# Patient Record
Sex: MALE | Race: WHITE | NOT HISPANIC OR LATINO | Employment: OTHER | URBAN - METROPOLITAN AREA
[De-identification: names, ages, dates, MRNs, and addresses within clinical notes are randomized per-mention and may not be internally consistent; named-entity substitution may affect disease eponyms.]

---

## 2017-01-25 ENCOUNTER — AMBULATORY - HEALTHEAST (OUTPATIENT)
Dept: CARDIOLOGY | Facility: CLINIC | Age: 62
End: 2017-01-25

## 2017-01-25 DIAGNOSIS — I48.19 PERSISTENT ATRIAL FIBRILLATION (H): ICD-10-CM

## 2017-02-01 ENCOUNTER — HOSPITAL ENCOUNTER (OUTPATIENT)
Dept: CARDIOLOGY | Facility: CLINIC | Age: 62
Discharge: HOME | End: 2017-02-01
Attending: INTERNAL MEDICINE

## 2017-02-01 DIAGNOSIS — I48.19 PERSISTENT ATRIAL FIBRILLATION (H): ICD-10-CM

## 2017-02-15 ENCOUNTER — RECORDS - HEALTHEAST (OUTPATIENT)
Dept: LAB | Facility: CLINIC | Age: 62
End: 2017-02-15

## 2017-02-15 LAB
CHOLEST SERPL-MCNC: 224 MG/DL
FASTING STATUS PATIENT QL REPORTED: YES
HCV AB SERPL QL IA: NEGATIVE
HDLC SERPL-MCNC: 38 MG/DL
LDLC SERPL CALC-MCNC: 161 MG/DL
PSA SERPL-MCNC: 1 NG/ML (ref 0–4.5)
TRIGL SERPL-MCNC: 124 MG/DL

## 2017-03-06 ENCOUNTER — AMBULATORY - HEALTHEAST (OUTPATIENT)
Dept: CARDIOLOGY | Facility: CLINIC | Age: 62
End: 2017-03-06

## 2017-03-08 ENCOUNTER — OFFICE VISIT - HEALTHEAST (OUTPATIENT)
Dept: CARDIOLOGY | Facility: CLINIC | Age: 62
End: 2017-03-08

## 2017-03-08 DIAGNOSIS — G47.33 OSA ON CPAP: ICD-10-CM

## 2017-03-08 DIAGNOSIS — I48.4 ATYPICAL ATRIAL FLUTTER (H): ICD-10-CM

## 2017-03-08 DIAGNOSIS — I48.19 PERSISTENT ATRIAL FIBRILLATION (H): ICD-10-CM

## 2017-03-08 ASSESSMENT — MIFFLIN-ST. JEOR: SCORE: 2444.15

## 2017-03-27 ENCOUNTER — AMBULATORY - HEALTHEAST (OUTPATIENT)
Dept: CARDIOLOGY | Facility: CLINIC | Age: 62
End: 2017-03-27

## 2017-03-27 ENCOUNTER — COMMUNICATION - HEALTHEAST (OUTPATIENT)
Dept: CARDIOLOGY | Facility: CLINIC | Age: 62
End: 2017-03-27

## 2017-03-27 DIAGNOSIS — I48.91 ATRIAL FIBRILLATION (H): ICD-10-CM

## 2017-03-29 ENCOUNTER — HOSPITAL ENCOUNTER (OUTPATIENT)
Dept: CARDIOLOGY | Facility: CLINIC | Age: 62
Discharge: HOME | End: 2017-03-29
Attending: NURSE PRACTITIONER

## 2017-03-29 DIAGNOSIS — I48.91 ATRIAL FIBRILLATION (H): ICD-10-CM

## 2017-10-30 ENCOUNTER — AMBULATORY - HEALTHEAST (OUTPATIENT)
Dept: CARDIOLOGY | Facility: CLINIC | Age: 62
End: 2017-10-30

## 2017-10-30 ENCOUNTER — RECORDS - HEALTHEAST (OUTPATIENT)
Dept: ADMINISTRATIVE | Facility: OTHER | Age: 62
End: 2017-10-30

## 2017-10-31 ENCOUNTER — OFFICE VISIT - HEALTHEAST (OUTPATIENT)
Dept: CARDIOLOGY | Facility: CLINIC | Age: 62
End: 2017-10-31

## 2017-10-31 DIAGNOSIS — Z98.890 STATUS POST ABLATION OF ATRIAL FIBRILLATION: ICD-10-CM

## 2017-10-31 DIAGNOSIS — I48.19 PERSISTENT ATRIAL FIBRILLATION (H): ICD-10-CM

## 2017-10-31 DIAGNOSIS — I48.4 ATYPICAL ATRIAL FLUTTER (H): ICD-10-CM

## 2017-10-31 DIAGNOSIS — G47.33 OSA ON CPAP: ICD-10-CM

## 2017-10-31 DIAGNOSIS — Z86.79 STATUS POST ABLATION OF ATRIAL FIBRILLATION: ICD-10-CM

## 2017-10-31 ASSESSMENT — MIFFLIN-ST. JEOR: SCORE: 2412.4

## 2018-04-20 ENCOUNTER — RECORDS - HEALTHEAST (OUTPATIENT)
Dept: LAB | Facility: CLINIC | Age: 63
End: 2018-04-20

## 2018-04-20 LAB
ALBUMIN SERPL-MCNC: 3.9 G/DL (ref 3.5–5)
ALP SERPL-CCNC: 105 U/L (ref 45–120)
ALT SERPL W P-5'-P-CCNC: 24 U/L (ref 0–45)
ANION GAP SERPL CALCULATED.3IONS-SCNC: 11 MMOL/L (ref 5–18)
AST SERPL W P-5'-P-CCNC: 17 U/L (ref 0–40)
BILIRUB SERPL-MCNC: 0.5 MG/DL (ref 0–1)
BUN SERPL-MCNC: 11 MG/DL (ref 8–22)
CALCIUM SERPL-MCNC: 9.1 MG/DL (ref 8.5–10.5)
CHLORIDE BLD-SCNC: 107 MMOL/L (ref 98–107)
CHOLEST SERPL-MCNC: 240 MG/DL
CO2 SERPL-SCNC: 25 MMOL/L (ref 22–31)
CREAT SERPL-MCNC: 0.94 MG/DL (ref 0.7–1.3)
CREAT UR-MCNC: 327.6 MG/DL
FASTING STATUS PATIENT QL REPORTED: ABNORMAL
GFR SERPL CREATININE-BSD FRML MDRD: >60 ML/MIN/1.73M2
GLUCOSE BLD-MCNC: 110 MG/DL (ref 70–125)
HDLC SERPL-MCNC: 41 MG/DL
LDLC SERPL CALC-MCNC: 176 MG/DL
MICROALBUMIN UR-MCNC: 1.84 MG/DL (ref 0–1.99)
MICROALBUMIN/CREAT UR: 5.6 MG/G
POTASSIUM BLD-SCNC: 4.1 MMOL/L (ref 3.5–5)
PROT SERPL-MCNC: 7.1 G/DL (ref 6–8)
PSA SERPL-MCNC: 1.1 NG/ML (ref 0–4.5)
SODIUM SERPL-SCNC: 143 MMOL/L (ref 136–145)
TRIGL SERPL-MCNC: 113 MG/DL
TSH SERPL DL<=0.005 MIU/L-ACNC: 2.51 UIU/ML (ref 0.3–5)

## 2018-08-06 ENCOUNTER — COMMUNICATION - HEALTHEAST (OUTPATIENT)
Dept: CARDIOLOGY | Facility: CLINIC | Age: 63
End: 2018-08-06

## 2018-08-06 DIAGNOSIS — I48.91 A-FIB (H): ICD-10-CM

## 2018-08-23 ENCOUNTER — AMBULATORY - HEALTHEAST (OUTPATIENT)
Dept: CARDIOLOGY | Facility: CLINIC | Age: 63
End: 2018-08-23

## 2018-08-23 ENCOUNTER — RECORDS - HEALTHEAST (OUTPATIENT)
Dept: ADMINISTRATIVE | Facility: OTHER | Age: 63
End: 2018-08-23

## 2018-08-29 ENCOUNTER — COMMUNICATION - HEALTHEAST (OUTPATIENT)
Dept: TELEHEALTH | Facility: CLINIC | Age: 63
End: 2018-08-29

## 2018-08-29 ENCOUNTER — OFFICE VISIT - HEALTHEAST (OUTPATIENT)
Dept: CARDIOLOGY | Facility: CLINIC | Age: 63
End: 2018-08-29

## 2018-08-29 DIAGNOSIS — I48.19 PERSISTENT ATRIAL FIBRILLATION (H): ICD-10-CM

## 2018-08-29 DIAGNOSIS — I48.4 ATYPICAL ATRIAL FLUTTER (H): ICD-10-CM

## 2018-08-29 DIAGNOSIS — G47.33 OSA ON CPAP: ICD-10-CM

## 2018-08-29 ASSESSMENT — MIFFLIN-ST. JEOR: SCORE: 2435.08

## 2018-11-28 ENCOUNTER — RECORDS - HEALTHEAST (OUTPATIENT)
Dept: LAB | Facility: CLINIC | Age: 63
End: 2018-11-28

## 2018-11-28 LAB
ALBUMIN SERPL-MCNC: 3.9 G/DL (ref 3.5–5)
ALP SERPL-CCNC: 98 U/L (ref 45–120)
ALT SERPL W P-5'-P-CCNC: 32 U/L (ref 0–45)
ANION GAP SERPL CALCULATED.3IONS-SCNC: 10 MMOL/L (ref 5–18)
AST SERPL W P-5'-P-CCNC: 19 U/L (ref 0–40)
BILIRUB SERPL-MCNC: 0.5 MG/DL (ref 0–1)
BUN SERPL-MCNC: 13 MG/DL (ref 8–22)
CALCIUM SERPL-MCNC: 9.5 MG/DL (ref 8.5–10.5)
CHLORIDE BLD-SCNC: 107 MMOL/L (ref 98–107)
CHOLEST SERPL-MCNC: 232 MG/DL
CO2 SERPL-SCNC: 24 MMOL/L (ref 22–31)
CREAT SERPL-MCNC: 0.82 MG/DL (ref 0.7–1.3)
FASTING STATUS PATIENT QL REPORTED: ABNORMAL
GFR SERPL CREATININE-BSD FRML MDRD: >60 ML/MIN/1.73M2
GLUCOSE BLD-MCNC: 107 MG/DL (ref 70–125)
HDLC SERPL-MCNC: 42 MG/DL
LDLC SERPL CALC-MCNC: 165 MG/DL
POTASSIUM BLD-SCNC: 4.2 MMOL/L (ref 3.5–5)
PROT SERPL-MCNC: 6.9 G/DL (ref 6–8)
SODIUM SERPL-SCNC: 141 MMOL/L (ref 136–145)
TRIGL SERPL-MCNC: 123 MG/DL
TSH SERPL DL<=0.005 MIU/L-ACNC: 2.89 UIU/ML (ref 0.3–5)

## 2019-05-02 ENCOUNTER — AMBULATORY - HEALTHEAST (OUTPATIENT)
Dept: CARDIOLOGY | Facility: CLINIC | Age: 64
End: 2019-05-02

## 2019-05-16 ENCOUNTER — RECORDS - HEALTHEAST (OUTPATIENT)
Dept: LAB | Facility: CLINIC | Age: 64
End: 2019-05-16

## 2019-05-16 LAB
ALBUMIN SERPL-MCNC: 3.8 G/DL (ref 3.5–5)
ALP SERPL-CCNC: 94 U/L (ref 45–120)
ALT SERPL W P-5'-P-CCNC: 28 U/L (ref 0–45)
ANION GAP SERPL CALCULATED.3IONS-SCNC: 10 MMOL/L (ref 5–18)
AST SERPL W P-5'-P-CCNC: 17 U/L (ref 0–40)
BILIRUB SERPL-MCNC: 0.4 MG/DL (ref 0–1)
BUN SERPL-MCNC: 12 MG/DL (ref 8–22)
CALCIUM SERPL-MCNC: 9.6 MG/DL (ref 8.5–10.5)
CHLORIDE BLD-SCNC: 106 MMOL/L (ref 98–107)
CHOLEST SERPL-MCNC: 231 MG/DL
CO2 SERPL-SCNC: 27 MMOL/L (ref 22–31)
CREAT SERPL-MCNC: 0.98 MG/DL (ref 0.7–1.3)
FASTING STATUS PATIENT QL REPORTED: YES
GFR SERPL CREATININE-BSD FRML MDRD: >60 ML/MIN/1.73M2
GLUCOSE BLD-MCNC: 141 MG/DL (ref 70–125)
HDLC SERPL-MCNC: 39 MG/DL
LDLC SERPL CALC-MCNC: 169 MG/DL
POTASSIUM BLD-SCNC: 4.6 MMOL/L (ref 3.5–5)
PROT SERPL-MCNC: 6.5 G/DL (ref 6–8)
PSA SERPL-MCNC: 1.3 NG/ML (ref 0–4.5)
SODIUM SERPL-SCNC: 143 MMOL/L (ref 136–145)
TRIGL SERPL-MCNC: 117 MG/DL
TSH SERPL DL<=0.005 MIU/L-ACNC: 3.15 UIU/ML (ref 0.3–5)

## 2019-05-17 LAB
MEV IGG SER IA-ACNC: POSITIVE
MUV IGG SER QL IA: POSITIVE
RUBV IGG SERPL QL IA: POSITIVE

## 2019-07-14 ENCOUNTER — HOSPITAL ENCOUNTER (OUTPATIENT)
Dept: CT IMAGING | Facility: CLINIC | Age: 64
Discharge: HOME | End: 2019-07-14

## 2019-07-14 ENCOUNTER — RECORDS - HEALTHEAST (OUTPATIENT)
Dept: ADMINISTRATIVE | Facility: OTHER | Age: 64
End: 2019-07-14

## 2019-07-14 DIAGNOSIS — R10.9 RIGHT FLANK PAIN: ICD-10-CM

## 2019-07-15 ENCOUNTER — OFFICE VISIT - HEALTHEAST (OUTPATIENT)
Dept: UROLOGY | Facility: CLINIC | Age: 64
End: 2019-07-15

## 2019-07-15 DIAGNOSIS — N13.2 HYDRONEPHROSIS WITH URINARY OBSTRUCTION DUE TO URETERAL CALCULUS: ICD-10-CM

## 2019-07-15 DIAGNOSIS — N20.0 CALCULUS OF KIDNEY: ICD-10-CM

## 2019-07-15 DIAGNOSIS — N20.1 CALCULUS OF URETER: ICD-10-CM

## 2019-07-15 LAB
ALBUMIN UR-MCNC: ABNORMAL MG/DL
APPEARANCE UR: CLEAR
BILIRUB UR QL STRIP: ABNORMAL
COLOR UR AUTO: YELLOW
GLUCOSE UR STRIP-MCNC: NEGATIVE MG/DL
HGB UR QL STRIP: ABNORMAL
KETONES UR STRIP-MCNC: ABNORMAL MG/DL
LEUKOCYTE ESTERASE UR QL STRIP: NEGATIVE
NITRATE UR QL: NEGATIVE
PH UR STRIP: 5.5 [PH] (ref 5–8)
SP GR UR STRIP: 1.02 (ref 1–1.03)
UROBILINOGEN UR STRIP-ACNC: ABNORMAL

## 2019-07-15 RX ORDER — HYDROMORPHONE HYDROCHLORIDE 2 MG/1
2-4 TABLET ORAL EVERY 4 HOURS PRN
Qty: 12 TABLET | Refills: 0 | Status: SHIPPED | OUTPATIENT
Start: 2019-07-15 | End: 2021-10-25

## 2019-07-16 ENCOUNTER — SURGERY - HEALTHEAST (OUTPATIENT)
Dept: SURGERY | Facility: CLINIC | Age: 64
End: 2019-07-16

## 2019-07-16 ENCOUNTER — ANESTHESIA - HEALTHEAST (OUTPATIENT)
Dept: SURGERY | Facility: CLINIC | Age: 64
End: 2019-07-16

## 2019-07-16 ASSESSMENT — MIFFLIN-ST. JEOR: SCORE: 2394.54

## 2019-07-18 ENCOUNTER — COMMUNICATION - HEALTHEAST (OUTPATIENT)
Dept: UROLOGY | Facility: CLINIC | Age: 64
End: 2019-07-18

## 2019-07-22 ENCOUNTER — AMBULATORY - HEALTHEAST (OUTPATIENT)
Dept: UROLOGY | Facility: CLINIC | Age: 64
End: 2019-07-22

## 2019-07-22 DIAGNOSIS — N20.1 CALCULUS OF URETER: ICD-10-CM

## 2019-07-22 LAB
ALBUMIN UR-MCNC: ABNORMAL MG/DL
APPEARANCE UR: ABNORMAL
BILIRUB UR QL STRIP: ABNORMAL
COLOR UR AUTO: ABNORMAL
GLUCOSE UR STRIP-MCNC: NEGATIVE MG/DL
HGB UR QL STRIP: ABNORMAL
KETONES UR STRIP-MCNC: ABNORMAL MG/DL
LEUKOCYTE ESTERASE UR QL STRIP: NEGATIVE
NITRATE UR QL: NEGATIVE
PH UR STRIP: 5.5 [PH] (ref 5–8)
SP GR UR STRIP: 1.02 (ref 1–1.03)
UROBILINOGEN UR STRIP-ACNC: ABNORMAL

## 2019-07-23 LAB — BACTERIA SPEC CULT: NO GROWTH

## 2019-08-19 ENCOUNTER — HOSPITAL ENCOUNTER (OUTPATIENT)
Dept: CT IMAGING | Facility: CLINIC | Age: 64
Discharge: HOME | End: 2019-08-19
Attending: UROLOGY

## 2019-08-19 ENCOUNTER — OFFICE VISIT - HEALTHEAST (OUTPATIENT)
Dept: UROLOGY | Facility: CLINIC | Age: 64
End: 2019-08-19

## 2019-08-19 DIAGNOSIS — N20.0 CALCULUS OF KIDNEY: ICD-10-CM

## 2019-08-19 DIAGNOSIS — N20.1 CALCULUS OF URETER: ICD-10-CM

## 2019-08-19 LAB
ALBUMIN UR-MCNC: NEGATIVE MG/DL
APPEARANCE UR: CLEAR
BILIRUB UR QL STRIP: NEGATIVE
COLOR UR AUTO: YELLOW
GLUCOSE UR STRIP-MCNC: NEGATIVE MG/DL
HGB UR QL STRIP: NEGATIVE
KETONES UR STRIP-MCNC: NEGATIVE MG/DL
LEUKOCYTE ESTERASE UR QL STRIP: NEGATIVE
NITRATE UR QL: NEGATIVE
PH UR STRIP: 5 [PH] (ref 5–8)
SP GR UR STRIP: >=1.03 (ref 1–1.03)
UROBILINOGEN UR STRIP-ACNC: NORMAL

## 2019-10-02 ENCOUNTER — RECORDS - HEALTHEAST (OUTPATIENT)
Dept: LAB | Facility: CLINIC | Age: 64
End: 2019-10-02

## 2019-10-02 LAB
ALBUMIN SERPL-MCNC: 3.9 G/DL (ref 3.5–5)
ALP SERPL-CCNC: 98 U/L (ref 45–120)
ALT SERPL W P-5'-P-CCNC: 27 U/L (ref 0–45)
ANION GAP SERPL CALCULATED.3IONS-SCNC: 7 MMOL/L (ref 5–18)
AST SERPL W P-5'-P-CCNC: 20 U/L (ref 0–40)
BILIRUB SERPL-MCNC: 0.5 MG/DL (ref 0–1)
BUN SERPL-MCNC: 15 MG/DL (ref 8–22)
CALCIUM SERPL-MCNC: 10 MG/DL (ref 8.5–10.5)
CHLORIDE BLD-SCNC: 103 MMOL/L (ref 98–107)
CHOLEST SERPL-MCNC: 239 MG/DL
CO2 SERPL-SCNC: 31 MMOL/L (ref 22–31)
CREAT SERPL-MCNC: 1.07 MG/DL (ref 0.7–1.3)
FASTING STATUS PATIENT QL REPORTED: ABNORMAL
GFR SERPL CREATININE-BSD FRML MDRD: >60 ML/MIN/1.73M2
GLUCOSE BLD-MCNC: 148 MG/DL (ref 70–125)
HDLC SERPL-MCNC: 42 MG/DL
LDLC SERPL CALC-MCNC: 171 MG/DL
POTASSIUM BLD-SCNC: 4.7 MMOL/L (ref 3.5–5)
PROT SERPL-MCNC: 6.7 G/DL (ref 6–8)
SODIUM SERPL-SCNC: 141 MMOL/L (ref 136–145)
TRIGL SERPL-MCNC: 130 MG/DL
TSH SERPL DL<=0.005 MIU/L-ACNC: 3.47 UIU/ML (ref 0.3–5)

## 2019-12-10 ENCOUNTER — AMBULATORY - HEALTHEAST (OUTPATIENT)
Dept: LAB | Facility: CLINIC | Age: 64
End: 2019-12-10

## 2019-12-10 DIAGNOSIS — N20.0 CALCULUS OF KIDNEY: ICD-10-CM

## 2019-12-10 LAB
MAGNESIUM 24H UR-MRATE: 65 MG/24 HR (ref 75–150)
MAGNESIUM UR-MCNC: 6.5 MG/DL
SPECIMEN VOL UR: 1000 ML

## 2019-12-11 LAB
CALCIUM 24H UR-MRATE: 99 MG/24HR (ref 25–300)
CHLORIDE 24H UR-SRATE: 191 MMOL/24HR (ref 110–250)
CITRATE 24H UR-MCNC: 1188 MG/24HR
CREATININE, 24 HR URINE - HISTORICAL: 1941 MG/24HR
OXALATE MG/SPEC: 82.3 MG/24HR (ref 7–44)
PH UR STRIP: 5.5 [PH] (ref 4.5–8)
PHOSPHORUS URINE MG/SPEC: 761 MG/24HR
POTASSIUM 24H UR-SCNC: 64 MMOL/24HR (ref 30–90)
SODIUM 24H UR-SRATE: 188 MMOL/24HR (ref 40–217)
SPECIMEN VOL UR: 1000 ML
URIC ACID URINE MG/SPEC: 560 MG/24HR (ref 250–750)

## 2019-12-19 ENCOUNTER — COMMUNICATION - HEALTHEAST (OUTPATIENT)
Dept: UROLOGY | Facility: CLINIC | Age: 64
End: 2019-12-19

## 2019-12-19 DIAGNOSIS — R82.992 HYPEROXALURIA: ICD-10-CM

## 2019-12-19 DIAGNOSIS — R34 LOW URINE OUTPUT: ICD-10-CM

## 2019-12-19 DIAGNOSIS — N20.1 CALCULUS OF URETER: ICD-10-CM

## 2019-12-27 ENCOUNTER — AMBULATORY - HEALTHEAST (OUTPATIENT)
Dept: CARDIOLOGY | Facility: CLINIC | Age: 64
End: 2019-12-27

## 2019-12-27 ENCOUNTER — RECORDS - HEALTHEAST (OUTPATIENT)
Dept: ADMINISTRATIVE | Facility: OTHER | Age: 64
End: 2019-12-27

## 2020-01-07 ENCOUNTER — OFFICE VISIT - HEALTHEAST (OUTPATIENT)
Dept: CARDIOLOGY | Facility: CLINIC | Age: 65
End: 2020-01-07

## 2020-01-07 DIAGNOSIS — I48.4 ATYPICAL ATRIAL FLUTTER (H): ICD-10-CM

## 2020-01-07 DIAGNOSIS — E66.01 MORBID OBESITY WITH BMI OF 45.0-49.9, ADULT (H): ICD-10-CM

## 2020-01-07 DIAGNOSIS — I48.0 PAROXYSMAL ATRIAL FIBRILLATION (H): ICD-10-CM

## 2020-01-07 DIAGNOSIS — G47.33 OSA ON CPAP: ICD-10-CM

## 2020-01-07 DIAGNOSIS — Z98.890 STATUS POST ABLATION OF ATRIAL FIBRILLATION: ICD-10-CM

## 2020-01-07 DIAGNOSIS — Z86.79 STATUS POST ABLATION OF ATRIAL FIBRILLATION: ICD-10-CM

## 2020-01-07 ASSESSMENT — MIFFLIN-ST. JEOR: SCORE: 2421.47

## 2020-01-09 ENCOUNTER — HOSPITAL ENCOUNTER (OUTPATIENT)
Dept: CARDIOLOGY | Facility: CLINIC | Age: 65
Discharge: HOME | End: 2020-01-09
Attending: INTERNAL MEDICINE

## 2020-01-09 DIAGNOSIS — I48.4 ATYPICAL ATRIAL FLUTTER (H): ICD-10-CM

## 2020-01-09 DIAGNOSIS — I48.0 PAROXYSMAL ATRIAL FIBRILLATION (H): ICD-10-CM

## 2020-01-09 DIAGNOSIS — Z98.890 STATUS POST ABLATION OF ATRIAL FIBRILLATION: ICD-10-CM

## 2020-01-09 DIAGNOSIS — Z86.79 STATUS POST ABLATION OF ATRIAL FIBRILLATION: ICD-10-CM

## 2020-04-14 ENCOUNTER — RECORDS - HEALTHEAST (OUTPATIENT)
Dept: LAB | Facility: CLINIC | Age: 65
End: 2020-04-14

## 2020-04-14 LAB
ALBUMIN SERPL-MCNC: 3.8 G/DL (ref 3.5–5)
ALP SERPL-CCNC: 99 U/L (ref 45–120)
ALT SERPL W P-5'-P-CCNC: 24 U/L (ref 0–45)
ANION GAP SERPL CALCULATED.3IONS-SCNC: 13 MMOL/L (ref 5–18)
AST SERPL W P-5'-P-CCNC: 14 U/L (ref 0–40)
BILIRUB SERPL-MCNC: 0.4 MG/DL (ref 0–1)
BUN SERPL-MCNC: 15 MG/DL (ref 8–22)
CALCIUM SERPL-MCNC: 9 MG/DL (ref 8.5–10.5)
CHLORIDE BLD-SCNC: 105 MMOL/L (ref 98–107)
CO2 SERPL-SCNC: 23 MMOL/L (ref 22–31)
CREAT SERPL-MCNC: 0.93 MG/DL (ref 0.7–1.3)
GFR SERPL CREATININE-BSD FRML MDRD: >60 ML/MIN/1.73M2
GLUCOSE BLD-MCNC: 139 MG/DL (ref 70–125)
POTASSIUM BLD-SCNC: 3.9 MMOL/L (ref 3.5–5)
PROT SERPL-MCNC: 6.7 G/DL (ref 6–8)
SODIUM SERPL-SCNC: 141 MMOL/L (ref 136–145)
TSH SERPL DL<=0.005 MIU/L-ACNC: 3.58 UIU/ML (ref 0.3–5)

## 2020-06-23 ENCOUNTER — COMMUNICATION - HEALTHEAST (OUTPATIENT)
Dept: CARDIOLOGY | Facility: CLINIC | Age: 65
End: 2020-06-23

## 2020-06-23 DIAGNOSIS — I48.91 ATRIAL FIBRILLATION (H): ICD-10-CM

## 2020-06-25 ENCOUNTER — RECORDS - HEALTHEAST (OUTPATIENT)
Dept: ADMINISTRATIVE | Facility: OTHER | Age: 65
End: 2020-06-25

## 2020-10-20 ENCOUNTER — RECORDS - HEALTHEAST (OUTPATIENT)
Dept: LAB | Facility: CLINIC | Age: 65
End: 2020-10-20

## 2020-10-21 ENCOUNTER — RECORDS - HEALTHEAST (OUTPATIENT)
Dept: LAB | Facility: CLINIC | Age: 65
End: 2020-10-21

## 2020-10-21 LAB
ANION GAP SERPL CALCULATED.3IONS-SCNC: 12 MMOL/L (ref 5–18)
BUN SERPL-MCNC: 14 MG/DL (ref 8–22)
CALCIUM SERPL-MCNC: 9.2 MG/DL (ref 8.5–10.5)
CHLORIDE BLD-SCNC: 105 MMOL/L (ref 98–107)
CHOLEST SERPL-MCNC: 217 MG/DL
CO2 SERPL-SCNC: 24 MMOL/L (ref 22–31)
CREAT SERPL-MCNC: 1.06 MG/DL (ref 0.7–1.3)
FASTING STATUS PATIENT QL REPORTED: ABNORMAL
GFR SERPL CREATININE-BSD FRML MDRD: >60 ML/MIN/1.73M2
GLUCOSE BLD-MCNC: 108 MG/DL (ref 70–125)
HDLC SERPL-MCNC: 31 MG/DL
LDLC SERPL CALC-MCNC: 155 MG/DL
POTASSIUM BLD-SCNC: 4.6 MMOL/L (ref 3.5–5)
SODIUM SERPL-SCNC: 141 MMOL/L (ref 136–145)
TRIGL SERPL-MCNC: 153 MG/DL
TSH SERPL DL<=0.005 MIU/L-ACNC: 2.96 UIU/ML (ref 0.3–5)
VIT B12 SERPL-MCNC: <146 PG/ML (ref 213–816)

## 2021-05-06 ENCOUNTER — RECORDS - HEALTHEAST (OUTPATIENT)
Dept: LAB | Facility: CLINIC | Age: 66
End: 2021-05-06

## 2021-05-06 LAB
ALBUMIN SERPL-MCNC: 3.7 G/DL (ref 3.5–5)
ALP SERPL-CCNC: 107 U/L (ref 45–120)
ALT SERPL W P-5'-P-CCNC: 21 U/L (ref 0–45)
ANION GAP SERPL CALCULATED.3IONS-SCNC: 13 MMOL/L (ref 5–18)
AST SERPL W P-5'-P-CCNC: 15 U/L (ref 0–40)
BILIRUB SERPL-MCNC: 0.4 MG/DL (ref 0–1)
BUN SERPL-MCNC: 14 MG/DL (ref 8–22)
CALCIUM SERPL-MCNC: 9 MG/DL (ref 8.5–10.5)
CHLORIDE BLD-SCNC: 103 MMOL/L (ref 98–107)
CO2 SERPL-SCNC: 25 MMOL/L (ref 22–31)
CREAT SERPL-MCNC: 1.04 MG/DL (ref 0.7–1.3)
GFR SERPL CREATININE-BSD FRML MDRD: >60 ML/MIN/1.73M2
GLUCOSE BLD-MCNC: 125 MG/DL (ref 70–125)
POTASSIUM BLD-SCNC: 4.3 MMOL/L (ref 3.5–5)
PROT SERPL-MCNC: 6.8 G/DL (ref 6–8)
SODIUM SERPL-SCNC: 141 MMOL/L (ref 136–145)
TSH SERPL DL<=0.005 MIU/L-ACNC: 3.09 UIU/ML (ref 0.3–5)

## 2021-05-28 NOTE — PROGRESS NOTES
Pt to have an epidural of lower back at [facility] not scheduled yet, wanting hold order for Eliquis for 3 days.  Reviewed with Skyler this is ok form filled out for [facility] and faxed back copy for chart  Pt was called with the above.

## 2021-05-30 VITALS — BODY MASS INDEX: 41.75 KG/M2 | WEIGHT: 315 LBS | HEIGHT: 73 IN

## 2021-05-30 NOTE — ANESTHESIA PREPROCEDURE EVALUATION
Anesthesia Evaluation      Patient summary reviewed   History of anesthetic complications     Airway   Mallampati: II   Pulmonary - normal exam   (+) sleep apnea on CPAP, ,                          Cardiovascular - normal exam  (+) dysrhythmias, ,     ECG reviewed     ROS comment: H/O A-fib, ablation x 2  EF 55% 2016     Neuro/Psych - negative ROS     Endo/Other    (+) diabetes mellitus well controlled, obesity,      GI/Hepatic/Renal    (+)   chronic renal disease,     Comments: nephrolithiasis     Other findings: PONV  K 4.6      Dental - normal exam                        Anesthesia Plan  Planned anesthetic: general endotracheal    ASA 3   Induction: intravenous   Anesthetic plan and risks discussed with: patient    Post-op plan: routine recovery

## 2021-05-30 NOTE — PROGRESS NOTES
Assessment/Plan:        Diagnoses and all orders for this visit:    Calculus of ureter  -     Urinalysis Macroscopic  -     Verify informed consent; Standing  -     Diet NPO; Standing  -     Place sequential compression device; Standing  -     XR Retrograde Pyelogram W or WO KUB Intraoperative; Standing  -     levoFLOXacin 500 mg/100 mL IVPB 500 mg (LEVAQUIN)  -     ketorolac injection 15 mg (TORADOL)  -     acetaminophen tablet 1,000 mg (TYLENOL)  -     sterile water IR irrigation solution 3,000 mL  -     HYDROmorphone (DILAUDID) 2 MG tablet; Take 1-2 tablets (2-4 mg total) by mouth every 4 (four) hours as needed for pain.  Dispense: 12 tablet; Refill: 0  -     Patient Stated Goal: Know what to expect after surgery  -     Ureteroscopy Education    Calculus of kidney  -     Ureteroscopy Education    Hydronephrosis with urinary obstruction due to ureteral calculus    Other orders  -     Discontinue: oxyCODONE-acetaminophen (PERCOCET/ENDOCET) 5-325 mg per tablet; TAKE 1-2 TABLETS BY MOUTH FOUR TIMES DAILY AS NEEDED FOR PAIN; Refill: 0  -     tamsulosin (FLOMAX) 0.4 mg cap; TAKE ONE CAPSULE BY MOUTH ONCE DAILY FOR KIDNEY STONES; Refill: 0      Stone Management Plan  [facility] 9/22/2015 10/20/2015 9/21/2016   Urinary Tract Infection No suspicion of infection No suspicion of infection No suspicion of infection   Renal Colic Asymptomatic at this time Asymptomatic at this time Asymptomatic at this time   Renal Failure No suspicion of renal failure No suspicion of renal failure No suspicion of renal failure   Current CT date 9/22/2015 - 9/21/2016   Right sided stones? No - No   R Number of ureteral stones - - -   R GSD of ureteral stones - - -   R Location of ureteral stone - - -   R Number of kidney stones  - - -   R GSD of kidney stones - - -   R Hydronephrosis - - -   R Stone Event Resolved event No current event -   Diagnosis date - - -   Initial location of primary symptomatic stone - - -   Initial GSD of  primary symptomatic stone - - -   Resolved date 9/22/2015 - -   R Post-op status No residual stone - -   R MET status - - -   Failure - - -   R Current Plan - - -   MET - - -   Clear rationale - - -   Left sided stones? No - No   L Number of ureteral stones - - -   L GSD of ureteral stones - - -   L Location of ureteral stone - - -   L Number of kidney stones  - - -   L GSD of kidney stones - - -   L Hydronephrosis None - -   L Stone Event Resolved event No current event -   Diagnosis date - - -   Initial location of primary symptomatic stone - - -   Initial GSD of primary symptomatic stone - - -   Resolved date 9/22/2015 - -   Post-op status No residual stone - -   L Current Plan - - -   Clear rationale - - -   Observe rationale - - -             Subjective:      HPI  Mr. Khari Jamison is a 64 y.o.  male returning to the [facility] [place] following [place]'s ER visit for urolithiasis.    He is a recurrent calcium oxalate stone former who has required stone clearance procedures. He has previously participated in stone risk evaluation and remains adherent to recommendations. He has identified modifiable stone risks including:  low urine volume and type II diabetes. He has no identified non-modifiable stone risk factors.    He was last seen for long term follow up in 2016 with CT at the time demonstrating no stone burden. He was to return in 12 months with imaging but failed to do so.    He was referred by his PCP for recently diagnosed urolithiasis. He developed acute onset right flank pain 4 days ago. The pain was sharp and constant without radiation. It felt similar to pain with prior stone events. He had associated nausea and vomiting. He was seen by his [facility] clinic over the weekend and sent to have CT scan at [facility] 7/14/19 which reported a new, obstructing right ureteral stone. He was given percocet which he has been taking every 4 hours for pain. He was prescribed  flomax but has not initiated.    Significant current symptoms include:  Mild right flank pain. Pertinent negative current symptoms include:  fever, chills, left flank pain, nausea, vomiting, hematuria, urinary frequency and dysuria..     CT scan from 7/14/19 is personally reviewed and demonstrates a mildly obstructing 8 mm right proximal ureteral stone. Bilateral lower pole renal stones, both < 2 mm.    Significant labs from presentation include moderate hematuria, no pyuria, negative nitrite and no bacteria.    PLAN    63 yo diabetic M with hx of CaOx stone disease and previous risk factors of low urine volume and mild hyperoxaluria. Active stone formation with obstructing right proximal ureteral stone. Tiny, solitary bilateral renal stones.    Will proceed with ureterscopic stone clearance tomorrow. Risks and benefits were detailed of ureteroscopic stone clearance including potential issues of urinary or systemic infection, ureteral injury, inaccessible stone, incomplete stone clearance, multiple surgeries, and stent related symptoms of urgency, frequency and hematuria Patient verbalized understanding. Patient agrees with plan as discussed. Preoperative evaluation with primary care has not been requested because of urgency of situation.    For symptom control, he was prescribed dilaudid and Flomax. Over the counter symptom control medications of ibuprofen, Dramamine and Tylenol were recommended.    Patient also seen and examined by Rocio Tellez PA-C       ROS   A 12 point comprehensive review of systems is negative except for HPI.    Past Medical History:   Diagnosis Date     Arrhythmia     a-fib     Atrial fibrillation (H)      Back pain      Back pain      Diabetes mellitus (H)      Hyperlipemia      Kidney stone      Obesity      GASTON (obstructive sleep apnea)     CPAP     PONV (postoperative nausea and vomiting)     postoperatively with oral pain meds     Status post ablation of atrial fibrillation 1/27/2016     PVI Sept 2014 (cryo-PVI + roof line + SAMUEL line)       Past Surgical History:   Procedure Laterality Date     CARDIAC ELECTROPHYSIOLOGY MAPPING AND ABLATION  1/27/16    pvi     CARDIOVERSION  10/31/14     CARDIOVERSION  05/16/2016     COLONOSCOPY N/A 11/18/2015    Procedure: COLONOSCOPY WITH POLYPECTOMY USING BIOPSY FORCEP;  Surgeon: Coco Vallecillo MD;  Location: [facility];  Service:      CYSTOSCOPY W/ LASER LITHOTRIPSY  aug 2015     CYSTOSCOPY W/ URETERAL STENT PLACEMENT Bilateral 8/20/2015    Procedure: CYSTOSCOPY, BILATERAL STENT REMOVAL, LEFT URETEROSCOPY, STONE EXTRACTION, LEFT STENT INSERTION;  Surgeon: Ansley eJffery MD;  Location: [facility] OR;  Service:      eyelid lift       DC ABLATE HEART DYSRHYTHM FOCUS      Description: Catheter Ablation Atrial Fibrillation;  Recorded: 10/08/2014;  Comments: 9/4/14 PVI Cyro to 4 PVs; Roof and SAMUEL lines done.     DC CARDIOVERSION ELECTIVE ARRHYTHMIA EXTERNAL  09/11/2015    Description: Elective Cardioversion External;  Recorded: 03/06/2014;  Comments: 10/11/13; ; 11/27/13 and reverted to afib after 11 days on mod dose sotalol.; ; 1/3/14  sinus on Amio     DC CARDIOVERSION ELECTIVE ARRHYTHMIA EXTERNAL      Description: Elective Cardioversion External;  Recorded: 11/19/2014;  Comments: 10/11/13; ; 11/27/13 and reverted to afib after 11 days on mod dose sotalol.; ; 1/3/14  sinus on Amio.  10/31/14     DC REMOVAL OF SPERM DUCT(S)      Description: Surgery Of Male Genitalia Vasectomy;  Recorded: 10/29/2013;     pulmonary vein isolation  9/4/14     TONSILLECTOMY       VASECTOMY         Current Outpatient Medications   Medication Sig Dispense Refill     cetirizine (ZYRTEC) 10 MG tablet Take 10 mg by mouth daily as needed.        ELIQUIS 5 mg Tab tablet TAKE ONE TABLET BY MOUTH EVERY 12 HOURS 60 tablet 3     levothyroxine (SYNTHROID, LEVOTHROID) 88 MCG tablet Take 88 mcg by mouth daily.       metFORMIN (GLUCOPHAGE) 500 MG tablet Take 1,000 mg by mouth 2 (two)  times a day with meals.       pregabalin (LYRICA) 75 MG capsule Take 75 mg by mouth daily.        simvastatin (ZOCOR) 20 MG tablet Take 20 mg by mouth bedtime.        No current facility-administered medications for this visit.        No Known Allergies    Social History     Socioeconomic History     Marital status:      Spouse name: Not on file     Number of children: Not on file     Years of education: Not on file     Highest education level: Not on file   Occupational History     Occupation:  for [employer]     Employer: [employer]   Social Needs     Financial resource strain: Not on file     Food insecurity:     Worry: Not on file     Inability: Not on file     Transportation needs:     Medical: Not on file     Non-medical: Not on file   Tobacco Use     Smoking status: Never Smoker     Smokeless tobacco: Never Used   Substance and Sexual Activity     Alcohol use: No     Alcohol/week: 0.6 oz     Types: 1 Cans of beer per week     Drug use: No     Sexual activity: Not on file   Lifestyle     Physical activity:     Days per week: Not on file     Minutes per session: Not on file     Stress: Not on file   Relationships     Social connections:     Talks on phone: Not on file     Gets together: Not on file     Attends Buddhism service: Not on file     Active member of club or organization: Not on file     Attends meetings of clubs or organizations: Not on file     Relationship status: Not on file     Intimate partner violence:     Fear of current or ex partner: Not on file     Emotionally abused: Not on file     Physically abused: Not on file     Forced sexual activity: Not on file   Other Topics Concern     Not on file   Social History Narrative     Not on file       Family History   Problem Relation Age of Onset     Cancer Mother         uterine     Diabetes Maternal Aunt      Urolithiasis Neg Hx      Clotting disorder Neg Hx      Gout Neg Hx      Heart disease Neg Hx      Anesthesia problems Neg Hx       Objective:      Physical Exam  Vitals:    07/15/19 1332   BP: 134/79   Pulse: 94   Temp: 97.8  F (36.6  C)     General - well developed, well nourished, appropriate for age. Appears no distress at this time   Heart - regular rate and rhythm, no murmur  Respiratory - normal effort, clear to auscultation, good air entry without adventitious noises  Abdomen - morbidly obese soft, non-tender, no hepatosplenomegaly, no masses.   - right flank mild tenderness, no suprapubic tenderness, kidney and bladder non-palpable  MSK - normal spinal curvature. no spinal tenderness. normal gait. muscular strength intact.  Neurology - cranial nerves II-XII grossly intact, normal sensation, no unsteadiness  Skin - intact, no bruising, no gouty tophi  Psych - oriented to time, place, and person, normal mood and affect.      Labs   Urinalysis POC (Office):  Blood UA   Date Value Ref Range Status   09/21/2016 Negative Negative Final   10/20/2015 Negative Negative Final   09/22/2015 Negative Negative Final     Nitrite, UA   Date Value Ref Range Status   07/15/2019 Negative Negative Final   09/21/2016 Negative Negative Final   10/20/2015 Negative Negative Final   09/22/2015 Negative Negative Final   06/15/2015 Negative Negative Final   10/29/2010 Negative (Negative) Final     Leukocytes, UA    Date Value Ref Range Status   09/21/2016 Negative Negative Final   10/20/2015 Negative Negative Final   09/22/2015 Negative Negative Final     pH UA   Date Value Ref Range Status   09/21/2016 5.0 4.5 - 8.0 Final   10/20/2015 5.5 4.5 - 8.0 Final   09/22/2015 5.5 4.5 - 8.0 Final       Lab Urinalysis:  Blood, UA   Date Value Ref Range Status   07/15/2019 Moderate (!) Negative Final   06/15/2015 Large (!) Negative Final   10/29/2010 Large (!) (Negative) Final     Nitrite, UA   Date Value Ref Range Status   07/15/2019 Negative Negative Final   09/21/2016 Negative Negative Final   10/20/2015 Negative Negative Final   09/22/2015 Negative Negative Final    06/15/2015 Negative Negative Final   10/29/2010 Negative (Negative) Final     Leukocytes, UA   Date Value Ref Range Status   07/15/2019 Negative Negative Final   06/15/2015 Negative Negative Final   10/29/2010 Negative (Negative) Final     pH, UA   Date Value Ref Range Status   07/15/2019 5.5 5.0 - 8.0 Final   06/15/2015 5.5 4.5 - 8.0 Final   10/29/2010 5.5 4.5 - 8.0 Final

## 2021-05-30 NOTE — TELEPHONE ENCOUNTER
Patient currently has a stent in and is set for removal on Monday.  He left a message regarding possible excessive blood in his urine.  Spoke with patient, who states that the blood is now less than before.  He will stay hydrated and will call with any other issues.  He is feeling well and has no pain at this time.  Cydney Moran RN

## 2021-05-30 NOTE — PROGRESS NOTES
Assessment/Plan:        Diagnoses and all orders for this visit:    Calculus of ureter  -     Urinalysis Macroscopic  -     Culture, Urine- Future; Future; Expected date: 08/21/2019  -     Culture, Urine- Future  -     Cystoscopy with Stent Removal Education  -     Patient Stated Goal: Prevent further stones  -     CT Abdomen Pelvis Without Oral Without IV Contrast; Future; Expected date: 08/21/2019  -     ciprofloxacin HCl tablet 250 mg (CIPRO)  -     lidocaine HCl 2 % topical jelly 10 mL (UROJET)    Other orders  -     ciprofloxacin HCl (CIPRO) 250 MG tablet  -     lidocaine HCl (UROJET) 2 % topical jelly      Stone Management Plan  [facility] Stone Management 9/21/2016 7/15/2019 7/22/2019   Urinary Tract Infection No suspicion of infection No suspicion of infection No suspicion of infection   Renal Colic Asymptomatic at this time Well controlled symptoms Well controlled symptoms   Renal Failure No suspicion of renal failure No suspicion of renal failure No suspicion of renal failure   Current CT date 9/21/2016 7/14/2019 -   Right sided stones? No Yes -   R Number of ureteral stones - 1 -   R GSD of ureteral stones - 8 -   R Location of ureteral stone - Proximal -   R Number of kidney stones  - 1 -   R GSD of kidney stones - < 2 -   R Hydronephrosis - Mild -   R Stone Event - New event Established event   Diagnosis date - 7/14/2019 -   Initial location of primary symptomatic stone - Proximal -   Initial GSD of primary symptomatic stone - 8 -   Resolved date - - -   R Post-op status - - Stent Removal   R MET status - - -   Failure - - -   R Current Plan - Clear -   MET - - -   Clear rationale - Poor prognosis -   Left sided stones? No Yes -   L Number of ureteral stones - No ureteral stones -   L GSD of ureteral stones - - -   L Location of ureteral stone - - -   L Number of kidney stones  - 1 -   L GSD of kidney stones - < 2 -   L Hydronephrosis - None -   L Stone Event - No current event No current event   Diagnosis  date - - -   Initial location of primary symptomatic stone - - -   Initial GSD of primary symptomatic stone - - -   Resolved date - - -   Post-op status - - -   L Current Plan - Observe -   Clear rationale - - -   Observe rationale - Limited stone burden with good prognosis for spontaneous passage -             Subjective:      HPI  Mr. Khari Jamison is a 64 y.o.  male returning to the [facility] [place] for early postoperative follow up for anticipated stent removal.     He returns status post right ureteroscopic laser lithotripsy for proximal ureteral stone. He has had no unanticipated post-operative events.    He has had no symptoms suspicious for infection and stent was mildly symptomatic with typical issues of flank discomfort and lower urinary tract irritation.     Flexible cystoscopy is performed and indwelling stent is removed without incident.    He will follow up in the office in one month with imaging.       ROS   Review of systems is negative except for HPI.    Past Medical History:   Diagnosis Date     Arrhythmia     a-fib     Atrial fibrillation (H)      Back pain      Back pain      Diabetes mellitus (H)      Hyperlipemia      Kidney stone      Obesity      GASTON (obstructive sleep apnea)     CPAP     PONV (postoperative nausea and vomiting)     postoperatively with oral pain meds     Status post ablation of atrial fibrillation 1/27/2016    PVI Sept 2014 (cryo-PVI + roof line + SAMUEL line)       Past Surgical History:   Procedure Laterality Date     CARDIAC ELECTROPHYSIOLOGY MAPPING AND ABLATION  1/27/16    pvi     CARDIOVERSION  10/31/14     CARDIOVERSION  05/16/2016     COLONOSCOPY N/A 11/18/2015    Procedure: COLONOSCOPY WITH POLYPECTOMY USING BIOPSY FORCEP;  Surgeon: Coco Vallecillo MD;  Location: [facility];  Service:      CYSTOSCOPY W/ LASER LITHOTRIPSY  aug 2015     CYSTOSCOPY W/ URETERAL STENT PLACEMENT Bilateral 8/20/2015    Procedure: CYSTOSCOPY, BILATERAL STENT  REMOVAL, LEFT URETEROSCOPY, STONE EXTRACTION, LEFT STENT INSERTION;  Surgeon: Ansley Jeffery MD;  Location: [facility] OR;  Service:      eyelid lift       TX ABLATE HEART DYSRHYTHM FOCUS      Description: Catheter Ablation Atrial Fibrillation;  Recorded: 10/08/2014;  Comments: 9/4/14 PVI Cyro to 4 PVs; Roof and SAMUEL lines done.     TX CARDIOVERSION ELECTIVE ARRHYTHMIA EXTERNAL  09/11/2015    Description: Elective Cardioversion External;  Recorded: 03/06/2014;  Comments: 10/11/13; ; 11/27/13 and reverted to afib after 11 days on mod dose sotalol.; ; 1/3/14  sinus on Amio     TX CARDIOVERSION ELECTIVE ARRHYTHMIA EXTERNAL      Description: Elective Cardioversion External;  Recorded: 11/19/2014;  Comments: 10/11/13; ; 11/27/13 and reverted to afib after 11 days on mod dose sotalol.; ; 1/3/14  sinus on Amio.  10/31/14     TX CYSTO/URETERO W/LITHOTRIPSY &INDWELL STENT INSRT Right 7/16/2019    Procedure: CYSTOSCOPY RIGHT URETEROSCOPY, LASER  LITHOTRIPSY STENT INSERTION;  Surgeon: Kingston Kurtz MD;  Location: [facility] OR;  Service: Urology     TX REMOVAL OF SPERM DUCT(S)      Description: Surgery Of Male Genitalia Vasectomy;  Recorded: 10/29/2013;     pulmonary vein isolation  9/4/14     TONSILLECTOMY       VASECTOMY         Current Outpatient Medications   Medication Sig Dispense Refill     acetaminophen (TYLENOL) 500 MG tablet Take 1,000 mg by mouth every 6 (six) hours as needed for pain.       cetirizine (ZYRTEC) 10 MG tablet Take 10 mg by mouth daily as needed.        ELIQUIS 5 mg Tab tablet TAKE ONE TABLET BY MOUTH EVERY 12 HOURS 60 tablet 3     HYDROmorphone (DILAUDID) 2 MG tablet Take 1-2 tablets (2-4 mg total) by mouth every 4 (four) hours as needed for pain. 12 tablet 0     ibuprofen (ADVIL,MOTRIN) 200 MG tablet Take 400 mg by mouth every 6 (six) hours as needed for pain.       levothyroxine (SYNTHROID, LEVOTHROID) 88 MCG tablet Take 88 mcg by mouth daily.       metFORMIN (GLUCOPHAGE) 500 MG  tablet Take 1,000 mg by mouth 2 (two) times a day with meals.       pregabalin (LYRICA) 75 MG capsule Take 75 mg by mouth daily.        simvastatin (ZOCOR) 20 MG tablet Take 20 mg by mouth bedtime.        tamsulosin (FLOMAX) 0.4 mg cap TAKE ONE CAPSULE BY MOUTH ONCE DAILY FOR KIDNEY STONES  0     Current Facility-Administered Medications   Medication Dose Route Frequency Provider Last Rate Last Dose     ciprofloxacin HCl (CIPRO) 250 MG tablet              lidocaine HCl (UROJET) 2 % topical jelly                No Known Allergies    Social History     Socioeconomic History     Marital status:      Spouse name: Not on file     Number of children: Not on file     Years of education: Not on file     Highest education level: Not on file   Occupational History     Occupation:  for [employer]     Employer: [employer]   Social Needs     Financial resource strain: Not on file     Food insecurity:     Worry: Not on file     Inability: Not on file     Transportation needs:     Medical: Not on file     Non-medical: Not on file   Tobacco Use     Smoking status: Never Smoker     Smokeless tobacco: Never Used   Substance and Sexual Activity     Alcohol use: No     Alcohol/week: 0.6 oz     Types: 1 Cans of beer per week     Drug use: No     Sexual activity: Not on file   Lifestyle     Physical activity:     Days per week: Not on file     Minutes per session: Not on file     Stress: Not on file   Relationships     Social connections:     Talks on phone: Not on file     Gets together: Not on file     Attends Zoroastrianism service: Not on file     Active member of club or organization: Not on file     Attends meetings of clubs or organizations: Not on file     Relationship status: Not on file     Intimate partner violence:     Fear of current or ex partner: Not on file     Emotionally abused: Not on file     Physically abused: Not on file     Forced sexual activity: Not on file   Other Topics Concern     Not on file    Social History Narrative     Not on file       Family History   Problem Relation Age of Onset     Cancer Mother         uterine     Diabetes Maternal Aunt      Urolithiasis Neg Hx      Clotting disorder Neg Hx      Gout Neg Hx      Heart disease Neg Hx      Anesthesia problems Neg Hx      Objective:      Physical Exam  Vitals:    07/22/19 0907   BP: 131/73   Pulse: 70   Temp: 97.7  F (36.5  C)     General - well developed, well nourished, appropriate for age. Appears no distress at this time  Abdomen - morbidly obese soft, non-tender, no hepatosplenomegaly, no masses.   - no flank tenderness, no suprapubic tenderness, kidney and bladder non-palpable  MSK - normal spinal curvature. no spinal tenderness. normal gait. muscular strength intact.  Psych - oriented to time, place, and person, normal mood and affect.      Labs   Urinalysis POC (Office):  Blood UA   Date Value Ref Range Status   09/21/2016 Negative Negative Final   10/20/2015 Negative Negative Final   09/22/2015 Negative Negative Final     Nitrite, UA   Date Value Ref Range Status   07/22/2019 Negative Negative Final   07/15/2019 Negative Negative Final   09/21/2016 Negative Negative Final   10/20/2015 Negative Negative Final   09/22/2015 Negative Negative Final   06/15/2015 Negative Negative Final     Leukocytes, UA    Date Value Ref Range Status   09/21/2016 Negative Negative Final   10/20/2015 Negative Negative Final   09/22/2015 Negative Negative Final     pH UA   Date Value Ref Range Status   09/21/2016 5.0 4.5 - 8.0 Final   10/20/2015 5.5 4.5 - 8.0 Final   09/22/2015 5.5 4.5 - 8.0 Final       Lab Urinalysis:  Blood, UA   Date Value Ref Range Status   07/22/2019 Large (!) Negative Final   07/15/2019 Moderate (!) Negative Final   06/15/2015 Large (!) Negative Final     Nitrite, UA   Date Value Ref Range Status   07/22/2019 Negative Negative Final   07/15/2019 Negative Negative Final   09/21/2016 Negative Negative Final   10/20/2015 Negative Negative  Final   09/22/2015 Negative Negative Final   06/15/2015 Negative Negative Final     Leukocytes, UA   Date Value Ref Range Status   07/22/2019 Negative Negative Final   07/15/2019 Negative Negative Final   06/15/2015 Negative Negative Final     pH, UA   Date Value Ref Range Status   07/22/2019 5.5 5.0 - 8.0 Final   07/15/2019 5.5 5.0 - 8.0 Final   06/15/2015 5.5 4.5 - 8.0 Final

## 2021-05-30 NOTE — ANESTHESIA POSTPROCEDURE EVALUATION
Patient: Khari Jamison  #3  CYSTOSCOPY RIGHT URETEROSCOPY, LASER  LITHOTRIPSY STENT INSERTION  Anesthesia type: general    Patient location: PACU  Last vitals:   Vitals Value Taken Time   /84 7/16/2019 12:18 PM   Temp 37.1  C (98.8  F) 7/16/2019 11:30 AM   Pulse 62 7/16/2019 12:18 PM   Resp 16 7/16/2019 11:46 AM   SpO2 97 % 7/16/2019 12:19 PM   Vitals shown include unvalidated device data.  Post vital signs: stable  Level of consciousness: awake and responds to simple questions  Post-anesthesia pain: pain controlled  Post-anesthesia nausea and vomiting: no  Pulmonary: unassisted  Cardiovascular: stable  Hydration: adequate  Anesthetic events: no    QCDR Measures:  ASA# 11 - Laura-op Cardiac Arrest: ASA11B - Patient did NOT experience unanticipated cardiac arrest  ASA# 12 - Laura-op Mortality Rate: ASA12B - Patient did NOT die  ASA# 13 - PACU Re-Intubation Rate: ASA13B - Patient did NOT require a new airway mgmt  ASA# 10 - Composite Anes Safety: ASA10A - No serious adverse event    Additional Notes:

## 2021-05-30 NOTE — PATIENT INSTRUCTIONS - HE
Patient Stated Goal: Prevent further stones  Cystoscopy with Stent Removal    Cystoscopy is used to help diagnose urinary problems, or to remove a ureteral stent.    During a cystoscopy, your doctor examines the inside of your bladder with an instrument called a cystoscope. A cystoscope is a long, thin flexible tube with a camera at the end.    Your doctor will insert the scope into your urethra allowing him to visualize and evaluate the inside of the bladder for possible abnormalities. The urethra is the tube that carries urine to the outside of your body.    How is the stent removed?    Your stent will be removed in the [facility] with a small telescope and a grasping tool.  It usually takes less than 1 minute to remove the stent.    What should I expect after the stent is removed?     You should feel normal by the next day.    Some patients find:      An increase in back pain about an hour after the stent is removed as the kidney fills up with urine before it starts to empty.  It can be as uncomfortable as your initial stone episode.  Taking pain medications before stent removal may be helpful, but you would need someone else to drive you to and from your appointment.    Bladder symptoms usually disappear by the next morning.    Small amounts of blood in the urine may be seen occasionally for up to a week.    At Home:      It is important to drink plenty of fluids after your procedure    You may continue to use your pain medications as prescribed    What symptoms should I watch for?    Fever     Chills    Increasing back pain that is not relieved with pain medications    Large amounts of blood in the urine or large clots    Leakage of urine (incontinence)     Are not able to urinate for 8 hours    These symptoms may mean you have a blockage or infection. Call the [facility] 24 hours a day at [phone] immediately.

## 2021-05-30 NOTE — PATIENT INSTRUCTIONS - HE
Patient Stated Goal: Know what to expect after surgery  Ureteroscopy    Ureteroscopy is a procedure which is done for clearance of stones from the ureter, kidney or both. There are no incisions involved. The procedure involves your surgeon placing a small scope into your urethra. This is the opening where urine leaves your body.  The surgeon watches as they carefully guide the scope to the stone(s).  Modern flexible ureteroscopes can be used to reach virtually any location within the urinary tract.     The size, shape and location of the stone determines how best to treat the stone(s).  Whenever possible, stones are removed in one piece.  Larger stones need to be broken using a laser before removing in smaller pieces.  The goal is to remove all stones and stone fragments from that side of the body in a single treatment.  Complete stone clearance is an important step to prevent future kidney stone episodes.    Surgery:    Same day outpatient procedure    30-60 minutes    Procedure done in hospital surgical suite    General anesthesia (you will be asleep during the procedure)     Antibiotic prior to surgery to prevent infection    Physician will visit with you and respond to any questions or concerns and consent will be signed prior to going to the operating room    Risks:    Infection - Preoperative antibiotics should prevent new infections but it is possible that unanticipated bacteria may be introduced at time of surgery or that the stones were actually chronically infected before surgery      Injury - The ureter may be injured during this procedure.  This is most likely to happen if the ureter was very inflamed before surgery or if a stone is very tightly impacted.  The surgeon will not aggressively treat a stone if this creates a risk of injury.        Inaccessible Stones -A single procedure is effective in 95% of cases, but if your ureter is very narrow or your kidney stone is very impacted, a stent will be  placed and the procedure stopped.  In 1-2 weeks after the ureter has relaxed, the patient will be brought back to surgery and the procedure can be safely performed.      Incomplete stone clearance -Occasionally stone or stone fragments may not be completely cleared.  These may pass on their own, which may cause discomfort.  Our goal is to remove all possible stones and fragments.    Stent:      An internal soft tube will be placed between the kidney and the bladder while in surgery (after the stone is cleared). The stent will keep the kidney draining.    What should I expect?     It is common for a stent to cause some irritation and discomfort.   You may have:      The need to urinate suddenly     The need to urinate often     Pain during urination     A dull backache, which may get worse during urination     Blood stained urine (like fruit punch) and occasional small clots    It s important to remember the stent is necessary and only temporary. To feel more comfortable:      Drink more than you normally would but you do not have to constantly  flush your kidneys     Limiting your activity may decrease irritation or bleeding    Ibuprofen - 2 tablets every 6-8 hours     Use pain medications as directed.    When is the stent removed?    Most stents are removed within 5 days to 2 weeks after a procedure.     How is the stent removed?     Your stent will be removed in the [facility] with a small telescope and a grasping tool.  It usually takes less than 1 minute to remove the stent.    What should I expect after the stent is removed?     You should feel normal by the next day    Some patients find:    An increase in back pain about an hour after the stent is removed as the kidney fills up with urine before it starts to empty.  It can be as uncomfortable as your initial stone episode.  Taking pain medications before stent removal may be helpful, but you would need someone else to drive you to and from your  appointment.    Bladder symptoms usually disappear by the next morning.    Small amounts of blood in the urine may be seen occasionally for up to a week.    Diet:      After surgery, there are no dietary restrictions - Drink to thirst, there is no need to increase intake of fluids, as this may increase nausea symptoms. Try to eat smaller, more frequent snacks, instead of large meals.    Activity:    Many people return to work within 1-2 days. Fatigue is normal for a couple of weeks following surgery. With increased activity you may experience more discomfort and you may notice more blood in your urine.      Post-Operative Symptom Control    While you recover from your procedure, you can take steps to ease your recovery.    Medications that prevent further episodes of severe pain and help stones pass: Take these as prescribed on a regular basis even if you are NOT in pain      Ibuprofen (Advil or Motrin) - Is available over the counter Take 2 (200mg) tablets every 6 hours until the stone passes.  o prevents spasm of the ureter.    o Decreases pain      Dramamine - (drowsy version, non-generic formulation) Is available over the counter and decreases spasm of the ureter.  Take 50mg at bedtime every night until the stone passes. In addition, take every 6 hours as needed.  Dramamine:  o Decreases nausea  o Decreases acute pain  o Decreases recurrence of pain for next 24 hours  o Will help you sleep        *This medication will cause increased drowsiness, do not drive or operate machinery for 6 hours      Flomax- Studies show that Flomax decreases irritation from stents.   o Take every day with food until stone passes even if you do not have pain  o Flomax does not relieve pain.        *This medication may cause nasal congestion or light-headedness      Detrol ( Tolterodine) - After surgery Detrol may decrease stent irritation and pelvic pain  o Take as prescribed     *This medication may cause dry mouth, constipation or  blurry vision. Stop medication if unable to urinate.    Medication that are taken as needed to manage break through symptoms: Take these ONLY as required and hopefully not at all      Narcotics (Percocet, Vicodin, Dilaudid)- take as prescribed for severe pain unrelieved by ibuprofen and dramamine  o Take as prescribed for severe pain  o Narcotics have significant side effects and only  cover-up  pain. They have no effect on cause of pain.  o Common side effects:  - Confusion, disorientation and sedation - DO NOT DRIVE OR OPERATE MACHINERY WITHIN 24 HOURS  - Nausea - take Dramamine or Zofran  or Haldol to help control  - Constipation  - Sleep disturbances      Ondansetron (Zofran)-  o Take as prescribed  o Reserve for severe nausea  o May cause constipation, start over the counter Miralax if needed to treat this    Haldol-  o Take as prescribed  o Reserve for severe nausea    Warning Signs/Symptoms - Please call the [facility] 24 hours a day at [phone] IMMEDIATELY if you experience any of these:    Fever greater than 100.1     Chills    Pain NOT CONTROLLED by pain medications    Heavy bleeding or large clots in urine (small clots can be normal)    Persistent nausea and/or vomiting    Post-Operative Follow up:    The stone(s) will be sent from surgery to a lab for composition analysis.  These results are usually available before a one month post-operative visit.  If you had laser treatment to break up your stone, you will usually be scheduled for a low dose CT scan prior to your one month appointment.  This scan allows your surgeon to confirm that all stone fragments were cleared at time of surgery and that there have been no complications.  These results along with possible labs and urine studies will help us develop an individualized plan to prevent new stones from forming and keep existing stones from enlarging.  This visit is usually scheduled about 1 month after your original surgery.    The  [facility] can respond to your questions or concerns 24 hours a day at [phone].

## 2021-05-30 NOTE — PROGRESS NOTES
Patient presents to the office today for evaluation from a Primary Care Referral.  Cydney Moran RN

## 2021-05-30 NOTE — ANESTHESIA CARE TRANSFER NOTE
Last vitals:   Vitals:    07/16/19 1039   BP: 140/80   Pulse: 72   Resp: 16   Temp: 37.1  C (98.8  F)   SpO2: 99%     Patient's level of consciousness is drowsy  Spontaneous respirations: yes  Maintains airway independently: yes  Dentition unchanged: yes  Oropharynx: oropharynx clear of all foreign objects    QCDR Measures:  ASA# 20 - Surgical Safety Checklist: WHO surgical safety checklist completed prior to induction    PQRS# 430 - Adult PONV Prevention: 4558F - Pt received => 2 anti-emetic agents (different classes) preop & intraop  ASA# 8 - Peds PONV Prevention: NA - Not pediatric patient, not GA or 2 or more risk factors NOT present  PQRS# 424 - Laura-op Temp Management: 4559F - At least one body temp DOCUMENTED => 35.5C or 95.9F within required timeframe  PQRS# 426 - PACU Transfer Protocol: - Transfer of care checklist used  ASA# 14 - Acute Post-op Pain: ASA14B - Patient did NOT experience pain >= 7 out of 10

## 2021-05-31 VITALS — HEIGHT: 73 IN | WEIGHT: 315 LBS | BODY MASS INDEX: 41.75 KG/M2

## 2021-05-31 NOTE — PROGRESS NOTES
Assessment/Plan:        Diagnoses and all orders for this visit:    Calculus of ureter  -     Urinalysis Macroscopic    Calculus of kidney  -     Stone Formation, 24 Hour Urine x1; Future; Expected date: 09/02/2019  -     Patient Stated Goal: Prevent further stones  -     24 Hour Urine Collection Steps Education      Stone Management Plan  [facility] 7/15/2019 7/22/2019 8/19/2019   Urinary Tract Infection No suspicion of infection No suspicion of infection No suspicion of infection   Renal Colic Well controlled symptoms Well controlled symptoms Asymptomatic at this time   Renal Failure No suspicion of renal failure No suspicion of renal failure No suspicion of renal failure   Current CT date 7/14/2019 - 8/19/2019   Right sided stones? Yes - No   R Number of ureteral stones 1 - -   R GSD of ureteral stones 8 - -   R Location of ureteral stone Proximal - -   R Number of kidney stones  1 - -   R GSD of kidney stones < 2 - -   R Hydronephrosis Mild - None   R Stone Event New event Established event Resolved event   Diagnosis date 7/14/2019 - -   Initial location of primary symptomatic stone Proximal - -   Initial GSD of primary symptomatic stone 8 - -   Resolved date - - 8/19/2019   R Post-op status - Stent Removal No residual stone   R MET status - - -   Failure - - -   R Current Plan Clear - -   MET - - -   Clear rationale Poor prognosis - -   Left sided stones? Yes - Yes   L Number of ureteral stones No ureteral stones - No ureteral stones   L GSD of ureteral stones - - -   L Location of ureteral stone - - -   L Number of kidney stones  1 - Renal stones unchanged from last exam   L GSD of kidney stones < 2 - < 2   L Hydronephrosis None - None   L Stone Event No current event No current event No current event   Diagnosis date - - -   Initial location of primary symptomatic stone - - -   Initial GSD of primary symptomatic stone - - -   Resolved date - - -   Post-op status - - -   L Current Plan Observe -  Observe   Clear rationale - - -   Observe rationale Limited stone burden with good prognosis for spontaneous passage - Limited stone burden with good prognosis for spontaneous passage         Subjective:      HPI  Mr. Khari Jamison is a 64 y.o.  male returning to the [facility] for late postoperative follow-up.     He returns status post Right ureteroscopic laser lithotripsy for proximal ureteral stone. He has had no unanticipated events.     He is asymptomatic at present. He denies symptoms of fever, chills, flank pain, nausea, vomiting, urinary frequency and dysuria.    New CT scan was personally reviewed and demonstrates complete clearance of targeted stone  with resolution of previous hydronephrosis. No change to small left renal stone.    Stone composition was 100% calcium oxalate.     PLAN    63 yo diabetic M with hx of CaOx stone disease and previous risk factors of low urine volume and mild hyperoxaluria. Active stone formation with interval ureteroscopic clearance of obstructing right proximal ureteral stone. Nonobstructing small left renal stone.    He has previously undergone stone risk evaluation but will repeat evaluation because of progression of stone disease.  One 24 hour urine collection and dietary journal will be obtained at earliest covenience.    Patient also seen and examined by Rocio Tellez PA-C       ROS   Review of systems is negative except for HPI.    Past Medical History:   Diagnosis Date     Arrhythmia     a-fib     Atrial fibrillation (H)      Back pain      Back pain      Diabetes mellitus (H)      Hyperlipemia      Kidney stone      Obesity      GASTON (obstructive sleep apnea)     CPAP     PONV (postoperative nausea and vomiting)     postoperatively with oral pain meds     Status post ablation of atrial fibrillation 1/27/2016    PVI Sept 2014 (cryo-PVI + roof line + SAMUEL line)       Past Surgical History:   Procedure Laterality Date     CARDIAC  ELECTROPHYSIOLOGY MAPPING AND ABLATION  1/27/16    pvi     CARDIOVERSION  10/31/14     CARDIOVERSION  05/16/2016     COLONOSCOPY N/A 11/18/2015    Procedure: COLONOSCOPY WITH POLYPECTOMY USING BIOPSY FORCEP;  Surgeon: Coco Vallecillo MD;  Location: [facility];  Service:      CYSTOSCOPY W/ LASER LITHOTRIPSY  aug 2015     CYSTOSCOPY W/ URETERAL STENT PLACEMENT Bilateral 8/20/2015    Procedure: CYSTOSCOPY, BILATERAL STENT REMOVAL, LEFT URETEROSCOPY, STONE EXTRACTION, LEFT STENT INSERTION;  Surgeon: Ansley Jeffery MD;  Location: [facility] OR;  Service:      eyelid lift       VT ABLATE HEART DYSRHYTHM FOCUS      Description: Catheter Ablation Atrial Fibrillation;  Recorded: 10/08/2014;  Comments: 9/4/14 PVI Cyro to 4 PVs; Roof and SAMUEL lines done.     VT CARDIOVERSION ELECTIVE ARRHYTHMIA EXTERNAL  09/11/2015    Description: Elective Cardioversion External;  Recorded: 03/06/2014;  Comments: 10/11/13; ; 11/27/13 and reverted to afib after 11 days on mod dose sotalol.; ; 1/3/14  sinus on Amio     VT CARDIOVERSION ELECTIVE ARRHYTHMIA EXTERNAL      Description: Elective Cardioversion External;  Recorded: 11/19/2014;  Comments: 10/11/13; ; 11/27/13 and reverted to afib after 11 days on mod dose sotalol.; ; 1/3/14  sinus on Amio.  10/31/14     VT CYSTO/URETERO W/LITHOTRIPSY &INDWELL STENT INSRT Right 7/16/2019    Procedure: CYSTOSCOPY RIGHT URETEROSCOPY, LASER  LITHOTRIPSY STENT INSERTION;  Surgeon: Kingston Kurtz MD;  Location: [facility];  Service: Urology     VT REMOVAL OF SPERM DUCT(S)      Description: Surgery Of Male Genitalia Vasectomy;  Recorded: 10/29/2013;     pulmonary vein isolation  9/4/14     TONSILLECTOMY       VASECTOMY         Current Outpatient Medications   Medication Sig Dispense Refill     acetaminophen (TYLENOL) 500 MG tablet Take 1,000 mg by mouth every 6 (six) hours as needed for pain.       cetirizine (ZYRTEC) 10 MG tablet Take 10 mg by mouth daily as needed.        ELIQUIS 5 mg  Tab tablet TAKE ONE TABLET BY MOUTH EVERY 12 HOURS 60 tablet 3     HYDROmorphone (DILAUDID) 2 MG tablet Take 1-2 tablets (2-4 mg total) by mouth every 4 (four) hours as needed for pain. 12 tablet 0     ibuprofen (ADVIL,MOTRIN) 200 MG tablet Take 400 mg by mouth every 6 (six) hours as needed for pain.       levothyroxine (SYNTHROID, LEVOTHROID) 88 MCG tablet Take 88 mcg by mouth daily.       metFORMIN (GLUCOPHAGE) 500 MG tablet Take 1,000 mg by mouth 2 (two) times a day with meals.       pregabalin (LYRICA) 75 MG capsule Take 75 mg by mouth daily.        simvastatin (ZOCOR) 20 MG tablet Take 20 mg by mouth bedtime.        No current facility-administered medications for this visit.        No Known Allergies    Social History     Socioeconomic History     Marital status:      Spouse name: Not on file     Number of children: Not on file     Years of education: Not on file     Highest education level: Not on file   Occupational History     Occupation:  for [employer]     Employer: [employer]   Social Needs     Financial resource strain: Not on file     Food insecurity:     Worry: Not on file     Inability: Not on file     Transportation needs:     Medical: Not on file     Non-medical: Not on file   Tobacco Use     Smoking status: Never Smoker     Smokeless tobacco: Never Used   Substance and Sexual Activity     Alcohol use: No     Alcohol/week: 0.6 oz     Types: 1 Cans of beer per week     Drug use: No     Sexual activity: Not on file   Lifestyle     Physical activity:     Days per week: Not on file     Minutes per session: Not on file     Stress: Not on file   Relationships     Social connections:     Talks on phone: Not on file     Gets together: Not on file     Attends Worship service: Not on file     Active member of club or organization: Not on file     Attends meetings of clubs or organizations: Not on file     Relationship status: Not on file     Intimate partner violence:     Fear of current  or ex partner: Not on file     Emotionally abused: Not on file     Physically abused: Not on file     Forced sexual activity: Not on file   Other Topics Concern     Not on file   Social History Narrative     Not on file       Family History   Problem Relation Age of Onset     Cancer Mother         uterine     Diabetes Maternal Aunt      Urolithiasis Neg Hx      Clotting disorder Neg Hx      Gout Neg Hx      Heart disease Neg Hx      Anesthesia problems Neg Hx      Objective:      Physical Exam  Vitals:    08/19/19 1500   BP: 142/74   Pulse: 85   Temp: 97.9  F (36.6  C)     General - well developed, well nourished, appropriate for age. Appears no distress at this time  Abdomen - moderately obese soft, non-tender, no hepatosplenomegaly, no masses.   - no flank tenderness, no suprapubic tenderness, kidney and bladder non-palpable  MSK - normal spinal curvature. no spinal tenderness. normal gait. muscular strength intact.  Psych - oriented to time, place, and person, normal mood and affect.      Labs   Urinalysis POC (Office):  Blood UA   Date Value Ref Range Status   09/21/2016 Negative Negative Final   10/20/2015 Negative Negative Final   09/22/2015 Negative Negative Final     Nitrite, UA   Date Value Ref Range Status   08/19/2019 Negative Negative Final   07/22/2019 Negative Negative Final   07/15/2019 Negative Negative Final     Leukocytes, UA    Date Value Ref Range Status   09/21/2016 Negative Negative Final   10/20/2015 Negative Negative Final   09/22/2015 Negative Negative Final     pH UA   Date Value Ref Range Status   09/21/2016 5.0 4.5 - 8.0 Final   10/20/2015 5.5 4.5 - 8.0 Final   09/22/2015 5.5 4.5 - 8.0 Final       Lab Urinalysis:  Blood, UA   Date Value Ref Range Status   08/19/2019 Negative Negative Final   07/22/2019 Large (!) Negative Final   07/15/2019 Moderate (!) Negative Final     Nitrite, UA   Date Value Ref Range Status   08/19/2019 Negative Negative Final   07/22/2019 Negative Negative Final    07/15/2019 Negative Negative Final     Leukocytes, UA   Date Value Ref Range Status   08/19/2019 Negative Negative Final   07/22/2019 Negative Negative Final   07/15/2019 Negative Negative Final     pH, UA   Date Value Ref Range Status   08/19/2019 5.0 5.0 - 8.0 Final   07/22/2019 5.5 5.0 - 8.0 Final   07/15/2019 5.5 5.0 - 8.0 Final

## 2021-05-31 NOTE — PATIENT INSTRUCTIONS - HE
Patient Stated Goal: Prevent further stones  Steps for collecting a 24 hour urine specimen    Please follow the directions carefully. All urine voided for a 24-hour period needs to be collected into the jug.  DO NOT change any of your  normal  daily habits when doing this test. Continue to follow your regular diet, intake of fluids, and usual activity level. Pick the most convenient day with your schedule, perhaps on a weekend or a day off.    Start your Diet Log the day before collection and continue on the day of urine collection.  You MUST bring Diet Log with you on follow up visit to discuss results.    One 24hr Urine Collection     Two 24hr Urine Collections  (do not collect on consecutive days)    PLEASE COMPLETE THE 2nd JUG WITHIN 1-2 WEEKS FROM THE 1st JUG    STEP 1  Empty your bladder completely into the toilet. This will be your start time. Write your full legal name, start date and time on the jug label.  Collection start and stop times need to match exactly!  For example:  6 am to 6 am.    STEP 2  The next time you urinate, empty your bladder directly into the jug or collection hat and pour urine into the jug.  Screw the lid back onto the jug.  Do not spill!    STEP 3  Place the jug in the refrigerator or a cooler with ice during the collection period.  Failure to keep it cool could cause inaccurate test results. DO NOT Freeze.    STEP 4  Continue collecting all urine into the jug for the rest of the day, for the full 24 hours.  DO NOT stop early or go over 24 hours!    STEP 5  Exactly 24 hours from start of collection, write your full legal name, stop date and time on the jug label.   Collection start and stop times need to match exactly!  For example:  6 am to 6 am.  Failure to label correctly will result in recollection of urine specimen.    STEP 6  Return each jug within 24 hours after final urination.     STEP 7  Drop off jug locations:   [facility]: Mon-Fri 7am-7pm - Closed on  weekends  [facility]: Mon-Fri 7am-5pm - Closed on Sunday  [facility]: Mon-Fri 7am-6:30pm - Closed on weekends    STEP 8  Please call [facility] after return of your final jug to schedule your follow-up visit. [phone]

## 2021-06-01 VITALS — BODY MASS INDEX: 41.75 KG/M2 | WEIGHT: 315 LBS | HEIGHT: 73 IN

## 2021-06-03 VITALS — BODY MASS INDEX: 41.75 KG/M2 | WEIGHT: 315 LBS | HEIGHT: 73 IN

## 2021-06-04 VITALS
DIASTOLIC BLOOD PRESSURE: 82 MMHG | HEART RATE: 72 BPM | BODY MASS INDEX: 41.75 KG/M2 | WEIGHT: 315 LBS | RESPIRATION RATE: 20 BRPM | SYSTOLIC BLOOD PRESSURE: 142 MMHG | HEIGHT: 73 IN

## 2021-06-04 NOTE — TELEPHONE ENCOUNTER
Patient stated he wanted to cancel his follow up appt next week and discuss 24 hour urine results over the phone.    12/9/19 collection:  Urine volume very low 1000 mL  Creatinine 1941 mg  Calcium 99 mg  Sodium 188 mmol, previously 227-229  Citrate 1188 mg  Oxalate 82 mg, previously 43-44  PH 5.5    Persistent stone risk factor of low urine volume and now severe hyperoxaluria    Instructed Khari that he needs to focus on aggressive fluid intake to reach urine output of > 2L/day and cut out oxalate rich foods. He admits he has been eating more nuts.    Will put placeholder for CT in 12 months with updated 24 hour urine

## 2021-06-05 ENCOUNTER — RECORDS - HEALTHEAST (OUTPATIENT)
Dept: CARDIOLOGY | Facility: CLINIC | Age: 66
End: 2021-06-05

## 2021-06-05 DIAGNOSIS — I48.91 ATRIAL FIBRILLATION (H): ICD-10-CM

## 2021-06-05 NOTE — PROGRESS NOTES
[facility] HEART [facility]  Arrhythmia Clinic  Vincent Klein    Referring:      Assessment:         Persistent atrial fibrillation: The patient is approximately 3 years out from his ablation procedure to treat his atrial fibrillation.  The patient reports no palpitations or other symptoms suggesting atrial fibrillation or flutter in the past 18 months.  The patient is off membrane active antiarrhythmic drug therapy.  He remains on oral anticoagulant therapy with a chads vascular score of 1 presently which will increase to 2 in May.    Obstructive sleep apnea: The patient is compliant with CPAP therapy.    Borderline hypertension: The patient's blood pressure today is borderline elevated.  He reports that at home and at his primary's office he has been normotensive.  We will need to continue to watch this carefully.    Morbid obesity: The patient was encouraged to work on his weight loss and discuss this further with his primary care physician if he needs assistance.      Recommendations:    No change in his current medical therapy    48-hour monitor ordered to reassess his baseline rhythm status    Follow-up in the A. fib clinic with the EP nurse practitioner in 1 year.  The patient will contact us if he has symptoms suggesting recurrence of atrial fibrillation in the interim.    Patient Active Problem List   Diagnosis     Morbid obesity with BMI of 45.0-49.9, adult (H)     Type 2 Diabetes Mellitus     Hyperlipidemia     GASTON on CPAP     Persistent Atrial Fibrillation     Atypical atrial flutter (H)     Calculus of kidney     Hyperoxaluria     Status post ablation of atrial fibrillation       Subjective:  Khari Jamison (64 y.o. male) returns to the arrhythmia clinic for interval reevaluation of his atrial fibrillation post ablation.  The patient's over 3 years out from his ablation procedure to treat his atrial fibrillation.  He reports no symptoms of recurrent palpitations or other signs suggesting return  of atrial fibrillation or flutter.  He has not had any lightheadedness presyncope or syncope.  He remains fairly active and reports no exertional dyspnea or chest pain.  He has not experied any orthopnea, PND, or ankle edema.  He reports no new general healthcare concerns.    Current Outpatient Medications   Medication Sig Dispense Refill     acetaminophen (TYLENOL) 500 MG tablet Take 1,000 mg by mouth every 6 (six) hours as needed for pain.       cetirizine (ZYRTEC) 10 MG tablet Take 10 mg by mouth daily as needed.        ELIQUIS 5 mg Tab tablet TAKE ONE TABLET BY MOUTH EVERY 12 HOURS 60 tablet 3     HYDROmorphone (DILAUDID) 2 MG tablet Take 1-2 tablets (2-4 mg total) by mouth every 4 (four) hours as needed for pain. 12 tablet 0     ibuprofen (ADVIL,MOTRIN) 200 MG tablet Take 400 mg by mouth every 6 (six) hours as needed for pain.       levothyroxine (SYNTHROID, LEVOTHROID) 88 MCG tablet Take 88 mcg by mouth daily.       metFORMIN (GLUCOPHAGE) 500 MG tablet Take 1,000 mg by mouth 2 (two) times a day with meals.       pregabalin (LYRICA) 75 MG capsule Take 75 mg by mouth daily.        simvastatin (ZOCOR) 20 MG tablet Take 20 mg by mouth bedtime.        No current facility-administered medications for this visit.        Review of Systems:   General: WNL  Eyes: WNL  Ears/Nose/Throat: WNL  Lungs: WNL  Heart: WNL  Stomach: WNL  Bladder: WNL  Muscle/Joints: WNL  Skin: WNL  Nervous System: WNL  Mental Health: WNL     Blood: WNL    Family History  Family History   Problem Relation Age of Onset     Cancer Mother         uterine     Diabetes Maternal Aunt      Urolithiasis Neg Hx      Clotting disorder Neg Hx      Gout Neg Hx      Heart disease Neg Hx      Anesthesia problems Neg Hx        Social History   reports that he has never smoked. He has never used smokeless tobacco. He reports that he does not drink alcohol or use drugs.    Objective:   Vital signs in last 24 hours:  /82 (Patient Site: Left Arm, Patient  "Position: Sitting, Cuff Size: Adult Regular)   Pulse 72   Resp 20   Ht 6' 1\" (1.854 m)   Wt (!) 350 lb (158.8 kg)   BMI 46.18 kg/m    Weight: Weight: (!) 350 lb (158.8 kg)     Physical Exam:  General: The patient is alert oriented to person place and situation.  The patient is in no acute distress at the time of my evaluation.  Eyes: Pupils are equal, round, and reactive to light.  Conjunctiva and sclera are clear.  ENT: Oral mucosa is moist and without redness. No evident nasal discharge.  Pulmonary: Lungs are clear bilaterally with no rales, rhonchi, or wheezes.    Cardiovascular exam: Rhythm is regular with rare ectopic beats. S1 and S2 are normal. No significant murmur is present. JVP is normal. Lower extremities demonstrate no significant edema. Distal pulses are intact bilaterally.  Abdomen is morbidly obese, soft, and nontender.  Musculoskeletal: Spine is straight. Extremities without deformity.  Neuro: Gait is normal.   Skin is warm, dry, and otherwise intact.      Cardiographics:       Lab Results:   Lab Results   Component Value Date     10/02/2019    K 4.7 10/02/2019     10/02/2019    CO2 31 10/02/2019    BUN 15 10/02/2019    CREATININE 1.07 10/02/2019    CALCIUM 10.0 10/02/2019     Lab Results   Component Value Date    WBC 6.6 01/21/2016    WBC 6.2 09/03/2014    HGB 14.2 01/21/2016    HCT 43.5 01/21/2016    MCV 89 01/21/2016     01/21/2016     Lab Results   Component Value Date    INR 3.90 (H) 09/08/2014         Outside record review:        "

## 2021-06-09 NOTE — TELEPHONE ENCOUNTER
Spoke with pt and got new insurance numbers and will start with PA. May have to do Tier exception.

## 2021-06-09 NOTE — TELEPHONE ENCOUNTER
"----- Message from Yvonne Crawford RN sent at 6/23/2020  2:58 PM CDT -----  Regarding: FW: XAVIER reaves, have you seen this?  ----- Message -----  From: Rita Montoya  Sent: 6/23/2020   2:27 PM CDT  To: Paulo Grace Rn Support Pool  Subject: [facility]                                              Caller: Khari     Primary cardiologist: Dr. Klein    Detailed reason for call: Khari states his health insurance company is sending a request to Dr. Klein regarding priority for Khari taking Eloquis as opposed to Warfarin, generic or \"other\" anticoagulation. Please call back if you haven't received this and/or if you have questions.     Best phone number: [phone]    Best time to contact: Any    Ok to leave a detailed message? Yes    Device? No    Additional Info: Khari states he is unable to take Warfarin or the \"other\" anticoagulant.         "

## 2021-06-09 NOTE — PROGRESS NOTES
[facility]   Arrhythmia Clinic    Assessment/Plan:  Diagnoses and all orders for this visit:    Persistent atrial fibrillation and no recurrence since repeat ablation. [id number] score of 1 .  On Eliquis  without complications.  I recommended that Tyrone continue on Eliquis until follow-up with Dr. Klein in 6 months.  Would then ask Dr. Klein to give opinion on stopping Eliquis and going on full-strength aspirin.  I previously have kept him on Eliquis only to facilitate urgent cardioversion if he had reoccurrence.    Atypical atrial flutter with one recurrence since repeat ablation.  To stop sotalol today.  Will not replace it with any heart rate lowering medication as tendency towards  bradycardia.  He has no history of hypertension and typically blood pressure is lower than seen today.  Will follow.    GASTON on CPAP and using routinely and emphasized importance of this.    ______________________________________________________________________    Subjective:    I had the opportunity to see Khari Jamison at the [facility]. Khari Jamison is a 61 y.o. male and here for follow-up for persistent atrial fib and atypical atrial flutter .  Khari Jamison has a known history of persistent atrial fib and had a pulmonary vein isolation ablation with Dr. Klein in September 2014. With PVI, Tyrone had cryo to 4 pulmonary veins and SAMUEL and roofline done. He had reoccurrence of atrial flutter and had  repeat PVI in January 27 of 2016. Tyrone had no arrhythmias until he had recurrence of atypical atrial flutter in May of 2016 and had cardioversion restarted on sotalol at that time.  November 2016 his sotalol was decreased from 80 to40 twice daily.  He tells me that he is only gone one night without using his CPAP and had a very hard time sleeping.  He accidentally traveled without the mask.  He denies any symptoms of recurrence of atrial fib or flutter.  He has noticed no difference in how he feels on  lower dose of sotalol.  He denies bradycardic symptoms.  ________________________________________________________    Problem List:  Patient Active Problem List   Diagnosis     Morbid obesity with BMI of 45.0-49.9, adult     Type 2 Diabetes Mellitus     Hyperlipidemia     GASTON on CPAP     Persistent Atrial Fibrillation     Atypical atrial flutter     Hyperoxaluria     Status post ablation of atrial fibrillation     Medical History:  Past Medical History:   Diagnosis Date     Arrhythmia     a-fib     Atrial fibrillation      Back pain      Back pain      Diabetes mellitus      Hyperlipemia      Kidney stone      Obesity      GASTON (obstructive sleep apnea)     CPAP     PONV (postoperative nausea and vomiting)     postoperatively with oral pain meds     Status post ablation of atrial fibrillation 1/27/2016    PVI Sept 2014 (cryo-PVI + roof line + SAMUEL line)     Surgical History:  Past Surgical History:   Procedure Laterality Date     CARDIAC ELECTROPHYSIOLOGY MAPPING AND ABLATION  1/27/16    pvi     CARDIOVERSION  10/31/14     CARDIOVERSION  05/16/2016     COLONOSCOPY N/A 11/18/2015    Procedure: COLONOSCOPY WITH POLYPECTOMY USING BIOPSY FORCEP;  Surgeon: Coco Vallecillo MD;  Location: [facility] GI;  Service:      CYSTOSCOPY W/ LASER LITHOTRIPSY  aug 2015     CYSTOSCOPY W/ URETERAL STENT PLACEMENT Bilateral 8/20/2015    Procedure: CYSTOSCOPY, BILATERAL STENT REMOVAL, LEFT URETEROSCOPY, STONE EXTRACTION, LEFT STENT INSERTION;  Surgeon: Ansley Jeffery MD;  Location: [facility] OR;  Service:      eyelid lift       CO ABLATE HEART DYSRHYTHM FOCUS      Description: Catheter Ablation Atrial Fibrillation;  Recorded: 10/08/2014;  Comments: 9/4/14 PVI Cyro to 4 PVs; Roof and SAMUEL lines done.     CO CARDIOVERSION ELECTIVE ARRHYTHMIA EXTERNAL  09/11/2015    Description: Elective Cardioversion External;  Recorded: 03/06/2014;  Comments: 10/11/13; ; 11/27/13 and reverted to afib after 11 days on mod dose sotalol.; ; 1/3/14   "sinus on Amio     OH CARDIOVERSION ELECTIVE ARRHYTHMIA EXTERNAL      Description: Elective Cardioversion External;  Recorded: 11/19/2014;  Comments: 10/11/13; ; 11/27/13 and reverted to afib after 11 days on mod dose sotalol.; ; 1/3/14  sinus on Amio.  10/31/14     OH REMOVAL OF SPERM DUCT(S)      Description: Surgery Of Male Genitalia Vasectomy;  Recorded: 10/29/2013;     pulmonary vein isolation  9/4/14     TONSILLECTOMY       VASECTOMY       Social History:  Social History   Substance Use Topics     Smoking status: Never Smoker     Smokeless tobacco: Never Used     Alcohol use No        Review of Systems: Review of Systems:   General: WNL  Eyes: WNL  Ears/Nose/Throat: WNL  Lungs: WNL  Heart: WNL  Stomach: WNL  Bladder: WNL  Muscle/Joints: WNL  Skin: WNL  Nervous System: WNL  Mental Health: WNL     Blood: WNL      Family History:  Family History   Problem Relation Age of Onset     Cancer Mother      uterine     Diabetes Maternal Aunt      Urolithiasis Neg Hx      Clotting disorder Neg Hx      Gout Neg Hx      Heart disease Neg Hx      Anesthesia problems Neg Hx          Allergies:  No Known Allergies  Medications:  Current Outpatient Prescriptions   Medication Sig Dispense Refill     cetirizine (ZYRTEC) 10 MG tablet Take 10 mg by mouth daily as needed.        ELIQUIS 5 mg Tab tablet TAKE ONE TABLET BY MOUTH EVERY 12 HOURS 60 tablet 3     levothyroxine (SYNTHROID, LEVOTHROID) 88 MCG tablet Take 88 mcg by mouth daily.       metFORMIN (GLUCOPHAGE) 500 MG tablet Take 1,000 mg by mouth 2 (two) times a day with meals.       pregabalin (LYRICA) 75 MG capsule Take 75 mg by mouth daily.        simvastatin (ZOCOR) 20 MG tablet Take 20 mg by mouth bedtime.        No current facility-administered medications for this visit.        Objective:   Vital signs:  Visit Vitals     /80 (Patient Site: Left Arm, Patient Position: Sitting, Cuff Size: Adult Large)     Pulse (!) 56     Resp 16     Ht 6' 1\" (1.854 m)     Wt (!) 355 " lb (161 kg)     BMI 46.84 kg/m2         Physical Exam:    GENERAL APPEARANCE: Alert, cooperative and in no acute distress.  HEENT: No scleral icterus. No Xanthelasma. Oral mucuos membranes pink and moist.  NECK: JVP Nl.   CHEST: clear to auscultation  CARDIOVASCULAR: S1, S2 without murmur ,clicks or rubs. Regular, regular.  Radial and posterior tibial pulses are intact and symetric.  EXTREMITIES: No cyanosis, clubbing or edema.    Results personally reviewed:  7/14 Card MRI EF 73%. RA nl. LA mod enlarged. 4 PVs and largest 2,88 sq cm. No extracardiac findings.   8/14 AMANDEEP EF 60%. LV mod increased in thickness. No obvious valve dis. RA nl. LA not well seen.   January 2016 TE shows EF 55%. Left atrium moderately enlarged. No thrombus. Right atrium normal. No significant valve disease.  January 2016 reviewed PVI procedure note. All 4 pulmonary veins confirmed as isolated. Roof and SAMUEL lines confirmed as well. Tyrone had additional CFE and right CTI flutter lines created.  April 2016 2 weeks symptom monitor negative for any atrial fib or flutter.  February 2017 24-hour Holter shows sinus rhythm throughout with nocturnal bradycardia.  Ventricular response range 44-1 2 1.  Average ventricular response 59.  No A. fib or flutter seen.    Results for orders placed or performed in visit on 06/08/16   ECG 12 lead   Result Value Ref Range    SYSTOLIC BLOOD PRESSURE  mmHg    DIASTOLIC BLOOD PRESSURE  mmHg    VENTRICULAR RATE 48 BPM    ATRIAL RATE 48 BPM    P-R INTERVAL 166 ms    QRS DURATION 92 ms    Q-T INTERVAL 498 ms    QTC CALCULATION (BEZET) 444 ms    P Axis 42 degrees    R AXIS 22 degrees    T AXIS 23 degrees    MUSE DIAGNOSIS       Marked sinus bradycardia  Abnormal ECG  When compared with ECG of 18-MAY-2016 09:38,  ST no longer depressed in Anterior leads  Nonspecific T wave abnormality, improved in Inferior leads  T wave inversion no longer evident in Anterior leads  Confirmed by MARK ESCOTO, LES LOC:MARTINE ([id number]) on 6/8/2016  4:33:17 PM         TSH:   Lab Results   Component Value Date    TSH 2.35 02/15/2017     BNP: No results found for: BNP  BMP:  Lab Results   Component Value Date    CREATININE 0.83 02/15/2017    BUN 11 02/15/2017     02/15/2017    K 4.3 02/15/2017     02/15/2017    CO2 27 02/15/2017       This note has been dictated using voice recognition software. Any grammatical or context distortions are unintentional and inherent to the software.    KAYLEE SARAVIA RN, [facility]  [phone]

## 2021-06-09 NOTE — TELEPHONE ENCOUNTER
Information regarding your request   Prior Authorization Not Required  Will start Tier exception.

## 2021-06-13 NOTE — PROGRESS NOTES
"[facility] HEART [facility]  Arrhythmia Clinic  Vincent Klein    Referring:      Assessment:         Persistent atrial fibrillation: The patient is approximately 18 months out from his most recent procedure to treat his atrial fibrillation.  He had a single recurrence of atypical flutter just over a year ago and has had no documented recurrence of atrial fibrillation or flutter since that time.  The patient has remained asymptomatic.  Due to the patient's elevated [id number] score he does currently remain on oral anticoagulant therapy.  Patient has not had any bleeding problems.  The patient does however have high risk hobbies including use of multiple power tools and saws. He is off membrane active antiarrhythmic drug therapy at this time.    Obstructive sleep apnea: The patient is compliant with his CPAP therapy.      Recommendations:    No change in current medical therapy.  I would recommend that the patient remain on oral anticoagulant therapy.  The patient is a potential future candidate for left atrial appendage occlusion.    Follow-up in the A. fib clinic with the EP nurse practitioner in 1 year.      Patient Active Problem List   Diagnosis     Morbid obesity with BMI of 45.0-49.9, adult     Type 2 Diabetes Mellitus     Hyperlipidemia     GASTON on CPAP     Persistent Atrial Fibrillation     Atypical atrial flutter     Hyperoxaluria     Status post ablation of atrial fibrillation       Subjective:  Khari Jamison (62 y.o. male) returns to the arrhythmia clinic for interval reevaluation of his atrial fibrillation post ablation.  The patient is 18 months out from his most recent ablation for treatment of his persistent atrial fibrillation.  The patient had a single recurrence in May 2016 of an atypical flutter but since that time he notes only rare episodes of a \"spike\" in his heart rate.  This lasts less than a minute and resolves spontaneously.  He has had no sustained palpitations.  He has had no " "lightheadedness, presyncope, or syncope.  He has been vacationing in doing some fairly heavy exertion including walking and being at altitude without any chest pain or pressure.  His windedness is appropriate for the elevation in exertional level.  He reports no orthopnea, PND, or ankle edema.    Current Outpatient Prescriptions   Medication Sig Dispense Refill     cetirizine (ZYRTEC) 10 MG tablet Take 10 mg by mouth daily as needed.        ELIQUIS 5 mg Tab tablet TAKE ONE TABLET BY MOUTH EVERY 12 HOURS 60 tablet 3     levothyroxine (SYNTHROID, LEVOTHROID) 88 MCG tablet Take 88 mcg by mouth daily.       metFORMIN (GLUCOPHAGE) 500 MG tablet Take 1,000 mg by mouth 2 (two) times a day with meals.       pregabalin (LYRICA) 75 MG capsule Take 75 mg by mouth daily.        simvastatin (ZOCOR) 20 MG tablet Take 20 mg by mouth bedtime.        No current facility-administered medications for this visit.        Review of Systems:   General: WNL  Eyes: WNL  Ears/Nose/Throat: WNL  Lungs: WNL  Heart: WNL  Stomach: WNL  Bladder: WNL  Muscle/Joints: WNL  Skin: WNL  Nervous System: WNL  Mental Health: WNL     Blood: WNL    Family History  Family History   Problem Relation Age of Onset     Cancer Mother      uterine     Diabetes Maternal Aunt      Urolithiasis Neg Hx      Clotting disorder Neg Hx      Gout Neg Hx      Heart disease Neg Hx      Anesthesia problems Neg Hx        Social History   reports that he has never smoked. He has never used smokeless tobacco. He reports that he does not drink alcohol or use illicit drugs.    Objective:   Vital signs in last 24 hours:  /74 (Patient Site: Left Arm, Patient Position: Sitting, Cuff Size: Adult Large)  Pulse 64  Resp 18  Ht 6' 1\" (1.854 m)  Wt (!) 348 lb (157.9 kg)  BMI 45.91 kg/m2  Weight: Weight: (!) 348 lb (157.9 kg)     Physical Exam:  General: The patient is alert oriented to person place and situation.  The patient is in no acute distress at the time of my " evaluation.  Eyes: Pupils are equal, round, and reactive to light.  Conjunctiva and sclera are clear.  ENT: Oral mucosa is moist and without redness. No evident nasal discharge.  Pulmonary: Lungs are clear bilaterally with no rales, rhonchi, or wheezes.    Cardiovascular exam: Rhythm is regular. S1 and S2 are normal. No significant murmur is present. JVP is normal. Lower extremities demonstrate no significant edema. Distal pulses are intact bilaterally.  Abdomen is obese, soft, and nontender.  Musculoskeletal: Spine is straight. Extremities without deformity.  Neuro: Gait is normal.   Skin is warm, dry, and otherwise intact.      Cardiographics:       Lab Results:   Lab Results   Component Value Date     08/24/2017    K 4.2 08/24/2017     08/24/2017    CO2 27 08/24/2017    BUN 16 08/24/2017    CREATININE 0.87 08/24/2017    CALCIUM 9.4 08/24/2017     Lab Results   Component Value Date    WBC 6.6 01/21/2016    WBC 6.2 09/03/2014    HGB 14.2 01/21/2016    HCT 43.5 01/21/2016    MCV 89 01/21/2016     01/21/2016     Lab Results   Component Value Date    INR 3.90 (H) 09/08/2014         Outside record review:

## 2021-06-26 NOTE — PROGRESS NOTES
Progress Notes by Mary Kate Hermosillo CNP at 8/29/2018  7:50 AM     Author: Mary Kate Hermosillo CNP Service: -- Author Type: Nurse Practitioner    Filed: 8/29/2018  9:36 AM Encounter Date: 8/29/2018 Status: Signed    : Mary Kate Hermosillo CNP (Nurse Practitioner)          Click to link to [facility] Heart Care     [facility] HEART CARE ELECTROPHYSIOLOGY NOTE      Assessment/Recommendations   Assessment/Plan:    Diagnoses and all orders for this visit:    Atypical atrial flutter (H) with one documented episode of atypical atrial flutter in May 2016 with RVR of 191 on 12-lead EKG. Not very symptomatic with this but noted rapid heart rates on checking blood pressure.  He had about a week of frequent episodes of palpitations which lasted for about an hour and no particular trigger.  They were daily for about a week.  I wanted to get EKG evidence of arrhythmia prior to starting antiarrhythmic but Tyrone did not do 24-hour Holter because they stopped it as abruptly as they had started.  He and his wife still have not identified any particular trigger for these arrhythmia to occur.  I discussed with Tyrone if he has reoccurrence of atrial arrhythmias first antiarrhythmic choice would be sotalol 80 mg 1 tablet orally every 12 hours.  Tyrone tells me that he felt very lethargic on sotalol and wants to avoid use of antiarrhythmics if possible.  Will follow for reoccurrence.  To call if more frequent episodes of atrial fibrillation flutter or in this rhythm for 24 hours or more.  I discussed with Tyrone that recent studies recommend weight loss as it can help decrease  frequency of atrial fib and flutter.  I discussed gastric bypass clinic would be an option for him and he could do diet and exercise guidelines with providers there and does not necessarily have to consider surgery.  If he goes through this clinic it is generally covered by insurance.  At this point, he is not interested.      Persistent atrial fibrillation  (H) history and no documented reoccurrence of A. fib since repeat ablation in 2016.    GASTON on CPAP and using CPAP routinely.    [id number] score of 1 and on Eliquis.  Follow up in clinic with Dr. Klein in 1 year or sooner if needed.     History of Present Illness    Mr. Khari Jamison is a very pleasant 63 y.o. male who comes in today for EP follow-up for persistent atrial fibrillation and atypical atrial flutter.  Khari Jamison has a known history of persistent atrial fibrillation and had first pulmonary vein isolation ablation in September 2014.  He then had reoccurrence and had a redo PVI in January 2016.  Tyrone is had reoccurrence of atypical atrial flutter on May 16 of 2016 after redo PVI.  He has previously been on sotalol and amiodarone.  He called in in early August with complaints of frequent palpitations lasting about an hour every day for about a week.  See above for more details.  Outside of these episodes, he denies any other symptoms of reoccurrence of atrial fibrillation or flutter throughout the year.  He denies any changes in health or cardiac symptoms.  His father is 91 and doing well.  He lives in [place] and they are going out to visit him.  Cardiographics (personally reviewed):  Results for orders placed during the hospital encounter of 01/27/16   Echo Transesophageal With Bubble Study [ECH12] 01/27/2016    Narrative     Summary   Normal left ventricular size and systolic function.   Left ventricular ejection fraction is visually estimated to be 55%.   Moderately enlarged left atrium.   No evidence of thrombus within left atrium.             Results for orders placed or performed in visit on 06/08/16   ECG 12 lead   Result Value Ref Range    SYSTOLIC BLOOD PRESSURE  mmHg    DIASTOLIC BLOOD PRESSURE  mmHg    VENTRICULAR RATE 48 BPM    ATRIAL RATE 48 BPM    P-R INTERVAL 166 ms    QRS DURATION 92 ms    Q-T INTERVAL 498 ms    QTC CALCULATION (BEZET) 444 ms    P Axis 42 degrees    R AXIS 22 degrees     T AXIS 23 degrees    MUSE DIAGNOSIS       Marked sinus bradycardia  Abnormal ECG  When compared with ECG of 18-MAY-2016 09:38,  ST no longer depressed in Anterior leads  Nonspecific T wave abnormality, improved in Inferior leads  T wave inversion no longer evident in Anterior leads  Confirmed by CHESTER FLORES MD LOC:MARTINE ([id number]) on 6/8/2016 4:33:17 PM            Problem List:  Patient Active Problem List   Diagnosis   ? Morbid obesity with BMI of 45.0-49.9, adult (H)   ? Type 2 Diabetes Mellitus   ? Hyperlipidemia   ? GASTON on CPAP   ? Persistent Atrial Fibrillation   ? Atypical atrial flutter (H)   ? Hyperoxaluria (H)   ? Status post ablation of atrial fibrillation       Physical Examination Review of Systems   Vitals:    08/29/18 0754   BP: 130/80   Pulse: 66   Resp: 18     Body mass index is 46.57 kg/(m^2).  Wt Readings from Last 3 Encounters:   08/29/18 (!) 353 lb (160.1 kg)   10/31/17 (!) 348 lb (157.9 kg)   03/08/17 (!) 355 lb (161 kg)     General Appearance:   Alert, well-appearing and in no acute distress.   HEENT: Atraumatic, normocephalic.  No scleral icterus, normal conjunctivae; mucous membranes pink and moist.     Chest: Chest symmetric, spine straight.   Lungs:   Respirations unlabored: Lungs are clear to auscultation.   Cardiovascular:   Normal first and second heart sounds with no murmurs, rubs, or gallops.  Regular, regular.  Radial and posterior tibial pulses are intact.  Normal JVD, no edema.       Extremities: No cyanosis or clubbing   Musculoskeletal: Moves all extremities   Skin: Warm, dry, intact.    Neurologic: Mood and affect are appropriate, alert and oriented to person, place, time, and situation    General: WNL  Eyes: WNL  Ears/Nose/Throat: WNL  Lungs: Shortness of Breath  Heart: Irregular Heartbeat  Stomach: WNL  Bladder: WNL  Muscle/Joints: WNL  Skin: WNL  Nervous System: WNL  Mental Health: WNL     Blood: WNL       Medical History  Surgical History Family History Social History   Past  Medical History:   Diagnosis Date   ? Arrhythmia     a-fib   ? Atrial fibrillation (H)    ? Back pain    ? Back pain    ? Diabetes mellitus (H)    ? Hyperlipemia    ? Kidney stone    ? Obesity    ? GASTON (obstructive sleep apnea)     CPAP   ? PONV (postoperative nausea and vomiting)     postoperatively with oral pain meds   ? Status post ablation of atrial fibrillation 1/27/2016    PVI Sept 2014 (cryo-PVI + roof line + SAMUEL line)    Past Surgical History:   Procedure Laterality Date   ? CARDIAC ELECTROPHYSIOLOGY MAPPING AND ABLATION  1/27/16    pvi   ? CARDIOVERSION  10/31/14   ? CARDIOVERSION  05/16/2016   ? COLONOSCOPY N/A 11/18/2015    Procedure: COLONOSCOPY WITH POLYPECTOMY USING BIOPSY FORCEP;  Surgeon: Coco Vallecillo MD;  Location: [facility];  Service:    ? CYSTOSCOPY W/ LASER LITHOTRIPSY  aug 2015   ? CYSTOSCOPY W/ URETERAL STENT PLACEMENT Bilateral 8/20/2015    Procedure: CYSTOSCOPY, BILATERAL STENT REMOVAL, LEFT URETEROSCOPY, STONE EXTRACTION, LEFT STENT INSERTION;  Surgeon: Ansley Jeffery MD;  Location: [facility] OR;  Service:    ? eyelid lift     ? OK ABLATE HEART DYSRHYTHM FOCUS      Description: Catheter Ablation Atrial Fibrillation;  Recorded: 10/08/2014;  Comments: 9/4/14 PVI Cyro to 4 PVs; Roof and SAMUEL lines done.   ? OK CARDIOVERSION ELECTIVE ARRHYTHMIA EXTERNAL  09/11/2015    Description: Elective Cardioversion External;  Recorded: 03/06/2014;  Comments: 10/11/13; ; 11/27/13 and reverted to afib after 11 days on mod dose sotalol.; ; 1/3/14  sinus on Amio   ? OK CARDIOVERSION ELECTIVE ARRHYTHMIA EXTERNAL      Description: Elective Cardioversion External;  Recorded: 11/19/2014;  Comments: 10/11/13; ; 11/27/13 and reverted to afib after 11 days on mod dose sotalol.; ; 1/3/14  sinus on Amio.  10/31/14   ? OK REMOVAL OF SPERM DUCT(S)      Description: Surgery Of Male Genitalia Vasectomy;  Recorded: 10/29/2013;   ? pulmonary vein isolation  9/4/14   ? TONSILLECTOMY     ? VASECTOMY       Family History   Problem Relation Age of Onset   ? Cancer Mother      uterine   ? Diabetes Maternal Aunt    ? Urolithiasis Neg Hx    ? Clotting disorder Neg Hx    ? Gout Neg Hx    ? Heart disease Neg Hx    ? Anesthesia problems Neg Hx     Social History     Social History   ? Marital status:      Spouse name: N/A   ? Number of children: N/A   ? Years of education: N/A     Occupational History   ?  for [employer]     Social History Main Topics   ? Smoking status: Never Smoker   ? Smokeless tobacco: Never Used   ? Alcohol use No   ? Drug use: No   ? Sexual activity: Not on file     Other Topics Concern   ? Not on file     Social History Narrative          Medications  Allergies   Current Outpatient Prescriptions   Medication Sig Dispense Refill   ? cetirizine (ZYRTEC) 10 MG tablet Take 10 mg by mouth daily as needed.      ? ELIQUIS 5 mg Tab tablet TAKE ONE TABLET BY MOUTH EVERY 12 HOURS 60 tablet 3   ? levothyroxine (SYNTHROID, LEVOTHROID) 88 MCG tablet Take 88 mcg by mouth daily.     ? metFORMIN (GLUCOPHAGE) 500 MG tablet Take 1,000 mg by mouth 2 (two) times a day with meals.     ? pregabalin (LYRICA) 75 MG capsule Take 75 mg by mouth daily.      ? simvastatin (ZOCOR) 20 MG tablet Take 20 mg by mouth bedtime.        No current facility-administered medications for this visit.       No Known Allergies   Medical, surgical, family, social history, and medications were all reviewed and updated as necessary.   Lab Results    Chemistry CBC/INR CHOLESTROL   Lab Results   Component Value Date    CREATININE 0.94 04/20/2018    BUN 11 04/20/2018     04/20/2018    K 4.1 04/20/2018     04/20/2018    CO2 25 04/20/2018     Creatinine (mg/dL)   Date Value   04/20/2018 0.94   08/24/2017 0.87   02/15/2017 0.83   02/10/2016 0.87     No results found for: BNP Lab Results   Component Value Date    WBC 6.6 01/21/2016    HGB 14.2 01/21/2016    HCT 43.5 01/21/2016    MCV 89 01/21/2016      01/21/2016     Lab Results   Component Value Date    INR 3.90 (H) 09/08/2014      Lab Results   Component Value Date    CHOL 240 (H) 04/20/2018    HDL 41 04/20/2018    LDLCALC 176 (H) 04/20/2018    TRIG 113 04/20/2018          Total Time-25  minutes with greater than 50% spent talking to patient and family regarding patient's relevant diagnoses.  This note has been dictated using voice recognition software. Any grammatical, typographical, or context distortions are unintentional and inherent to the software.    Mary Kate Hermosillo RN,  [facility]   Electrophysiology  [phone]

## 2021-10-20 ENCOUNTER — LAB REQUISITION (OUTPATIENT)
Dept: LAB | Facility: CLINIC | Age: 66
End: 2021-10-20

## 2021-10-20 DIAGNOSIS — E03.9 HYPOTHYROIDISM, UNSPECIFIED: ICD-10-CM

## 2021-10-20 DIAGNOSIS — N20.1 CALCULUS OF URETER: Primary | ICD-10-CM

## 2021-10-20 DIAGNOSIS — E78.2 MIXED HYPERLIPIDEMIA: ICD-10-CM

## 2021-10-20 PROCEDURE — 80061 LIPID PANEL: CPT | Performed by: FAMILY MEDICINE

## 2021-10-20 PROCEDURE — 80053 COMPREHEN METABOLIC PANEL: CPT | Performed by: FAMILY MEDICINE

## 2021-10-20 PROCEDURE — 84443 ASSAY THYROID STIM HORMONE: CPT | Performed by: FAMILY MEDICINE

## 2021-10-21 ENCOUNTER — HOSPITAL ENCOUNTER (OUTPATIENT)
Dept: CT IMAGING | Facility: CLINIC | Age: 66
Discharge: HOME | End: 2021-10-21
Admitting: UROLOGY
Payer: COMMERCIAL

## 2021-10-21 DIAGNOSIS — N20.1 CALCULUS OF URETER: ICD-10-CM

## 2021-10-21 LAB
ALBUMIN SERPL-MCNC: 3.9 G/DL (ref 3.5–5)
ALP SERPL-CCNC: 102 U/L (ref 45–120)
ALT SERPL W P-5'-P-CCNC: 21 U/L (ref 0–45)
ANION GAP SERPL CALCULATED.3IONS-SCNC: 8 MMOL/L (ref 5–18)
AST SERPL W P-5'-P-CCNC: 14 U/L (ref 0–40)
BILIRUB SERPL-MCNC: 0.5 MG/DL (ref 0–1)
BUN SERPL-MCNC: 9 MG/DL (ref 8–22)
CALCIUM SERPL-MCNC: 9.4 MG/DL (ref 8.5–10.5)
CHLORIDE BLD-SCNC: 105 MMOL/L (ref 98–107)
CHOLEST SERPL-MCNC: 236 MG/DL
CO2 SERPL-SCNC: 29 MMOL/L (ref 22–31)
CREAT SERPL-MCNC: 0.96 MG/DL (ref 0.7–1.3)
GFR SERPL CREATININE-BSD FRML MDRD: 82 ML/MIN/1.73M2
GLUCOSE BLD-MCNC: 122 MG/DL (ref 70–125)
HDLC SERPL-MCNC: 39 MG/DL
LDLC SERPL CALC-MCNC: 172 MG/DL
POTASSIUM BLD-SCNC: 4.5 MMOL/L (ref 3.5–5)
PROT SERPL-MCNC: 6.9 G/DL (ref 6–8)
SODIUM SERPL-SCNC: 142 MMOL/L (ref 136–145)
TRIGL SERPL-MCNC: 126 MG/DL
TSH SERPL DL<=0.005 MIU/L-ACNC: 3.22 UIU/ML (ref 0.3–5)

## 2021-10-21 PROCEDURE — 74176 CT ABD & PELVIS W/O CONTRAST: CPT

## 2021-10-25 ENCOUNTER — TELEPHONE (OUTPATIENT)
Dept: UROLOGY | Facility: CLINIC | Age: 66
End: 2021-10-25

## 2021-10-25 ENCOUNTER — VIRTUAL VISIT (OUTPATIENT)
Dept: UROLOGY | Facility: CLINIC | Age: 66
End: 2021-10-25
Payer: COMMERCIAL

## 2021-10-25 DIAGNOSIS — N20.1 CALCULUS OF URETER: Primary | ICD-10-CM

## 2021-10-25 PROCEDURE — 99213 OFFICE O/P EST LOW 20 MIN: CPT | Mod: TEL | Performed by: UROLOGY

## 2021-10-25 ASSESSMENT — PAIN SCALES - GENERAL: PAINLEVEL: NO PAIN (0)

## 2021-10-25 NOTE — PATIENT INSTRUCTIONS
Patient Stated Goal: Pass my stone  Symptom Control While Passing A Stone    The goal of [facility] is to let a smaller kidney stone (less than 4 to 5 mm) pass without intervention if possible. Giving your body a chance to clear the stone may take a few hours up to a few weeks.  Keeping you well-informed, safe and fairly comfortable is important.    Drink to thirst  Do not attempt to  flush out  your stone by drinking too much fluid. This does not work and may increase nausea. Drink enough to satisfy your body s thirst. Eating your normal diet is fine.   Medications (that may be suggested or prescribed)    Ibuprofen (Advil or Motrin) Available over the counter  o Take two (200mg) tablets every six hours until the stone passes.  o Prevents spasm of the ureter.    o Decreases pain.      Dramamine* (drowsy version, non-generic formulation) Available over the counter  o Take 50mg at bedtime  o Decreases spasms of the ureter  o Decreases nausea  o Decreases acute pain  o Decreases recurrence of pain for 24 hours  o Will help you sleep  *This medication will cause increase drowsiness, do not drive or operate machinery for 6 hours.      Narcotics (Percocet, Vicodin, Dilaudid) Take as prescribed for severe pain unrelieved by ibuprofen and Dramamine  o Narcotics have significant side effects and only  cover-up  pain. They have no effect on the cause of pain.  o Common side effects  - Confusion, disorientation and sedation - DO NOT DRIVE OR OPERATE MACHINERY WITHIN 24 HOURS  - Nausea - take Dramamine or Zofran or Haldol to help control  - Constipation  - Sleep disturbances      Ondansetron (Zofran) Take as prescribed  o Reserve for severe nausea  o May cause constipation, start over the counter Miralax if needed      Second Line Anti-Nausea Medication: Adding a different anti-nausea medication maybe helpful for persistent nausea.  The combined effect of different types of anti-nausea medications maybe more  effective than either medication by itself, even in higher doses.  o Compazine: Take as prescribed      Information about kidney stones    Crystals can form if chemicals are too concentrated in your urine. If the crystal grows over time, a stone may form. A stone usually isn t painful while it is still in the kidney.    As the stone begins to leave the kidney, you may experience episodes of flank pain as the kidney stone approaches the entrance to the ureter. Some people feel a vague ache in the side.    Kidney stones may fall into the ureter. Some stones are tiny and pass through without causing symptoms. The ureter is a small tube (approximately 1/8 of an inch wide). A kidney stone can get stuck and block the ureter. If this happens, urine backs up and flows back to the kidney. Back pressure on the kidney can cause:  o Severe flank pain radiating to the groin.  o Severe nausea and vomiting.  o The pain can occur in the lower back, side, groin or all three.      When the stone reaches the lower ureter, this can irritate the bladder and sensations of feeling the urge to urinate frequently and urgently may occur.      Once the stone passes out of your ureter and into your bladder, the symptoms of urgency and frequency will often disappear. Sometimes pain will come back for a short period and will not be as severe as before. The passage of the stone from your bladder and out of your body is usually not a problem. The urethra is at least twice as wide as the normal ureter, so the stone doesn t usually block it.    Strain all urine  If you pass the stone, save it. Place it in the container we have provided and bring it to the [facility] within a week of passing it. Your stone will then be sent for analysis which takes about a month.     Signs and symptoms you might experience    Nausea    Decreased appetite    Urinary frequency    Bloody urine     Chills    Fatigue    When to call [facility] or  go to the Emergency Room    Fever with a temperature greater than 100.1    Severe pain    Persistent nausea/vomiting    If the pain worsens or nausea/vomiting is uncontrolled with medications, STOP eating & drinking. You need to have an empty stomach for 8 hours prior to surgery. Call the [facility] immediately at [phone].           Follow-up    Low dose CT scan with doctor visit 1-2 weeks after initial clinic visit per doctor s instructions    Please cancel the CT scan visit if you pass a stone. Reschedule for a one month follow-up with doctor to discuss stone composition and future prevention.    Preventing future stones    Approximately a month after your stone is sent out for analysis, a prevention visit will occur with your provider, to discuss an individualized plan for prevention of new stones and to discuss managing stones that you may still have. Along with the analysis of the kidney stone, blood and urine tests may be indicated to develop this plan. Knowing the type of kidney stones you make, and why, allows the providers at the [facility] to recommend specific ways to prevent them.    Follow-up visits are an important part of monitoring and preventing future re-occurrences.    The [facility] is available for questions or concerns 24 hours a day at [phone]

## 2021-10-25 NOTE — PROGRESS NOTES
Patient is roomed via telephone for a virtual visit.  Patient confirmed he is in the [facility] at the time of this appointment.  Patient understands that this virtual visit is billable and agree to proceed with appointment.

## 2021-10-25 NOTE — PROGRESS NOTES
Assessment/Plan:    Assessment & Plan   Khari was seen today for flank pain.    Diagnoses and all orders for this visit:    Calculus of ureter  -     CT Abdomen Pelvis w/o Contrast - REG DOSE; Future  -     Patient Stated Goal: Pass my stone        Stone Management Plan  Stone Management 10/25/2021   Urinary Tract Infection No suspicion of infection   Renal Colic Asymptomatic at this time   Renal Failure No suspicion of renal failure   Current CT date 10/21/2021   Right sided stones? Yes   R Number of ureteral stones 1   R GSD of ureteral stones 4   R Location of ureteral stone Proximal   R Number of kidney stones  No renal stones   R Hydronephrosis Mild   R MET status Initiation   R Current Plan MET   Left sided stones? No   L Stone Event No current event           PLAN      Phone call duration: 10 minutes  15 minutes spent on the date of the encounter doing chart review, history and exam, documentation and further activities per the note    AKUA CARRILLO MD  [facility]    Subjective:     HPI  Mr. Khari Jamison is a 66 year old  male who is being evaluated via a billable telephone visit by [facility] for follow up of his stone disease.    Known stone patient had abrupt onset right flank pain last week. Pain was very consistent with previous stone episodes. No fever or chills. No voiding symptoms. Pain free last several days starting just about time CT was performed.    CT is personally reviewed and demonstrates 4 mm right proximal ureteral stone.    He is optimistic that he may have passed the stones but it does seem a little too convenient.    Will check CT to confirm next week.         ROS   Review of systems is negative except for HPI.    Past Medical History:   Diagnosis Date     Arrhythmia     a-fib     Atrial fibrillation (H)      Back pain      Back pain      Diabetes mellitus (H)      Hyperlipemia      Kidney stone       Obesity      GASTON (obstructive sleep apnea)     CPAP     PONV (postoperative nausea and vomiting)     postoperatively with oral pain meds     Status post ablation of atrial fibrillation 1/27/2016    PVI Sept 2014 (cryo-PVI + roof line + SAMUEL line)       Past Surgical History:   Procedure Laterality Date     CARDIAC ELECTROPHYSIOLOGY MAPPING AND ABLATION  1/27/16    pvi     CARDIOVERSION  10/31/14     CARDIOVERSION  05/16/2016     COLONOSCOPY N/A 11/18/2015    Procedure: COLONOSCOPY WITH POLYPECTOMY USING BIOPSY FORCEP;  Surgeon: Coco Vallecillo MD;  Location: [facility];  Service:      COMBINED CYSTOSCOPY, INSERT STENT URETER(S) Bilateral 8/20/2015    Procedure: CYSTOSCOPY, BILATERAL STENT REMOVAL, LEFT URETEROSCOPY, STONE EXTRACTION, LEFT STENT INSERTION;  Surgeon: Ansley Jeffery MD;  Location: [facility];  Service:      CYSTOSCOPY W/ LASER LITHOTRIPSY  aug 2015     OTHER SURGICAL HISTORY  9/4/14    pulmonary vein isolation     OTHER SURGICAL HISTORY      eyelid lift     IA ABLATE HEART DYSRHYTHM FOCUS      Description: Catheter Ablation Atrial Fibrillation;  Recorded: 10/08/2014;  Comments: 9/4/14 PVI Cyro to 4 PVs; Roof and SAMUEL lines done.     IA CARDIOVERSION ELECTIVE ARRHYTHMIA EXTERNAL  09/11/2015    Description: Elective Cardioversion External;  Recorded: 03/06/2014;  Comments: 10/11/13; ; 11/27/13 and reverted to afib after 11 days on mod dose sotalol.; ; 1/3/14  sinus on Amio     IA CARDIOVERSION ELECTIVE ARRHYTHMIA EXTERNAL      Description: Elective Cardioversion External;  Recorded: 11/19/2014;  Comments: 10/11/13; ; 11/27/13 and reverted to afib after 11 days on mod dose sotalol.; ; 1/3/14  sinus on Amio.  10/31/14     IA CYSTO/URETERO W/LITHOTRIPSY &INDWELL STENT INSRT Right 7/16/2019    Procedure: CYSTOSCOPY RIGHT URETEROSCOPY, LASER  LITHOTRIPSY STENT INSERTION;  Surgeon: Kingston Kurtz MD;  Location: [facility];  Service: Urology     REMOVAL OF SPERM DUCT(S)       Description: Surgery Of Male Genitalia Vasectomy;  Recorded: 10/29/2013;     TONSILLECTOMY       VASECTOMY         Current Outpatient Medications   Medication Sig Dispense Refill     apixaban ANTICOAGULANT (ELIQUIS) 5 mg Tab tablet [APIXABAN ANTICOAGULANT (ELIQUIS) 5 MG TAB TABLET] Take 1 tablet (5 mg total) by mouth every 12 (twelve) hours. 180 tablet 1     cetirizine (ZYRTEC) 10 MG tablet [CETIRIZINE (ZYRTEC) 10 MG TABLET] Take 10 mg by mouth daily as needed.        levothyroxine (SYNTHROID, LEVOTHROID) 88 MCG tablet [LEVOTHYROXINE (SYNTHROID, LEVOTHROID) 88 MCG TABLET] Take 88 mcg by mouth daily.       metFORMIN (GLUCOPHAGE) 500 MG tablet [METFORMIN (GLUCOPHAGE) 500 MG TABLET] Take 1,000 mg by mouth 2 (two) times a day with meals.       pregabalin (LYRICA) 75 MG capsule [PREGABALIN (LYRICA) 75 MG CAPSULE] Take 75 mg by mouth daily.        simvastatin (ZOCOR) 20 MG tablet [SIMVASTATIN (ZOCOR) 20 MG TABLET] Take 20 mg by mouth bedtime.          No Known Allergies    Social History     Socioeconomic History     Marital status:      Spouse name: Not on file     Number of children: Not on file     Years of education: Not on file     Highest education level: Not on file   Occupational History     Not on file   Tobacco Use     Smoking status: Never Smoker     Smokeless tobacco: Never Used   Substance and Sexual Activity     Alcohol use: No     Alcohol/week: 1.0 standard drinks     Drug use: No     Sexual activity: Not on file   Other Topics Concern     Not on file   Social History Narrative     Not on file     Social Determinants of Health     Financial Resource Strain:      Difficulty of Paying Living Expenses:    Food Insecurity:      Worried About Running Out of Food in the Last Year:      Ran Out of Food in the Last Year:    Transportation Needs:      Lack of Transportation (Medical):      Lack of Transportation (Non-Medical):    Physical Activity:      Days of Exercise per Week:      Minutes of Exercise per  Session:    Stress:      Feeling of Stress :    Social Connections:      Frequency of Communication with Friends and Family:      Frequency of Social Gatherings with Friends and Family:      Attends [employer] Services:      Active Member of Clubs or Organizations:      Attends Club or Organization Meetings:      Marital Status:    Intimate Partner Violence:      Fear of Current or Ex-Partner:      Emotionally Abused:      Physically Abused:      Sexually Abused:        Family History   Problem Relation Age of Onset     Cancer Mother         uterine     Diabetes Maternal Aunt      Urolithiasis No family hx of      Clotting Disorder No family hx of      Gout No family hx of      Heart Disease No family hx of      Anesthesia Reaction No family hx of        Objective:     No vitals or physical exam obtained due to virtual visit  Labs   Acute Labs   Urine Culture    Culture   Date Value Ref Range Status   07/22/2019 No Growth  Final

## 2021-11-01 ENCOUNTER — VIRTUAL VISIT (OUTPATIENT)
Dept: UROLOGY | Facility: CLINIC | Age: 66
End: 2021-11-01
Payer: COMMERCIAL

## 2021-11-01 ENCOUNTER — HOSPITAL ENCOUNTER (OUTPATIENT)
Dept: CT IMAGING | Facility: CLINIC | Age: 66
Discharge: HOME | End: 2021-11-01
Attending: UROLOGY | Admitting: UROLOGY
Payer: COMMERCIAL

## 2021-11-01 DIAGNOSIS — N20.1 CALCULUS OF URETER: Primary | ICD-10-CM

## 2021-11-01 DIAGNOSIS — N20.1 CALCULUS OF URETER: ICD-10-CM

## 2021-11-01 PROCEDURE — 99213 OFFICE O/P EST LOW 20 MIN: CPT | Mod: TEL | Performed by: UROLOGY

## 2021-11-01 PROCEDURE — 74176 CT ABD & PELVIS W/O CONTRAST: CPT

## 2021-11-01 RX ORDER — EZETIMIBE 10 MG/1
10 TABLET ORAL DAILY
COMMUNITY
Start: 2021-10-27 | End: 2023-03-29 | Stop reason: SINTOL

## 2021-11-01 ASSESSMENT — PAIN SCALES - GENERAL: PAINLEVEL: NO PAIN (0)

## 2021-11-01 NOTE — PROGRESS NOTES
Assessment/Plan:    Assessment & Plan   Khari was seen today for follow up.    Diagnoses and all orders for this visit:    Calculus of ureter  -     Patient Stated Goal: Prevent further stones        Stone Management Plan  Stone Management 10/25/2021 11/1/2021   Urinary Tract Infection No suspicion of infection No suspicion of infection   Renal Colic Asymptomatic at this time Asymptomatic at this time   Renal Failure No suspicion of renal failure No suspicion of renal failure   Current CT date 10/21/2021 11/1/2021   Right sided stones? Yes No   R Number of ureteral stones 1 -   R GSD of ureteral stones 4 -   R Location of ureteral stone Proximal -   R Number of kidney stones  No renal stones -   R Hydronephrosis Mild -   R Stone Event - Resolved event   Resolved date - 11/1/2021   R MET status Initiation Passed   R Current Plan MET -   Left sided stones? No No   L Stone Event No current event No current event           PLAN      Phone call duration: 8 minutes      AKUA CARRILLO MD  [facility]    Subjective:     HPI  Mr. Khari Jamison is a 66 year old  male who is being evaluated via a billable telephone visit by [facility] for medical expulsive therapy follow up.     On last encounter, his 4 mm stone was in right proximal ureter with Mild hydronephrosis. He has had no unanticipated events.    Symptoms have been minimal . He is asymptomatic at present. He denies symptoms of fever, chills, flank pain, nausea, vomiting, urinary frequency and dysuria.     New CT scan was personally reviewed and demonstrates passage of stone with resolution of previous hydronephrosis.     He is congratulated on passing his stone and will not proceed with stone risk evaluation     Will follow PRN.         ROS   Review of systems is negative except for HPI.    Past Medical History:   Diagnosis Date     Arrhythmia     a-fib     Atrial fibrillation (H)       Back pain      Back pain      Diabetes mellitus (H)      Hyperlipemia      Kidney stone      Obesity      GASTON (obstructive sleep apnea)     CPAP     PONV (postoperative nausea and vomiting)     postoperatively with oral pain meds     Status post ablation of atrial fibrillation 1/27/2016    PVI Sept 2014 (cryo-PVI + roof line + SAMUEL line)       Past Surgical History:   Procedure Laterality Date     CARDIAC ELECTROPHYSIOLOGY MAPPING AND ABLATION  1/27/16    pvi     CARDIOVERSION  10/31/14     CARDIOVERSION  05/16/2016     COLONOSCOPY N/A 11/18/2015    Procedure: COLONOSCOPY WITH POLYPECTOMY USING BIOPSY FORCEP;  Surgeon: Coco Vallecillo MD;  Location: [facility];  Service:      COMBINED CYSTOSCOPY, INSERT STENT URETER(S) Bilateral 8/20/2015    Procedure: CYSTOSCOPY, BILATERAL STENT REMOVAL, LEFT URETEROSCOPY, STONE EXTRACTION, LEFT STENT INSERTION;  Surgeon: Ansley Jeffery MD;  Location: [facility];  Service:      CYSTOSCOPY W/ LASER LITHOTRIPSY  aug 2015     OTHER SURGICAL HISTORY  9/4/14    pulmonary vein isolation     OTHER SURGICAL HISTORY      eyelid lift     CO ABLATE HEART DYSRHYTHM FOCUS      Description: Catheter Ablation Atrial Fibrillation;  Recorded: 10/08/2014;  Comments: 9/4/14 PVI Cyro to 4 PVs; Roof and SAMUEL lines done.     CO CARDIOVERSION ELECTIVE ARRHYTHMIA EXTERNAL  09/11/2015    Description: Elective Cardioversion External;  Recorded: 03/06/2014;  Comments: 10/11/13; ; 11/27/13 and reverted to afib after 11 days on mod dose sotalol.; ; 1/3/14  sinus on Amio     CO CARDIOVERSION ELECTIVE ARRHYTHMIA EXTERNAL      Description: Elective Cardioversion External;  Recorded: 11/19/2014;  Comments: 10/11/13; ; 11/27/13 and reverted to afib after 11 days on mod dose sotalol.; ; 1/3/14  sinus on Amio.  10/31/14     CO CYSTO/URETERO W/LITHOTRIPSY &INDWELL STENT INSRT Right 7/16/2019    Procedure: CYSTOSCOPY RIGHT URETEROSCOPY, LASER  LITHOTRIPSY STENT INSERTION;  Surgeon: Kingston Kurtz MD;   Location: [facility];  Service: Urology     REMOVAL OF SPERM DUCT(S)      Description: Surgery Of Male Genitalia Vasectomy;  Recorded: 10/29/2013;     TONSILLECTOMY       VASECTOMY         Current Outpatient Medications   Medication Sig Dispense Refill     apixaban ANTICOAGULANT (ELIQUIS) 5 mg Tab tablet [APIXABAN ANTICOAGULANT (ELIQUIS) 5 MG TAB TABLET] Take 1 tablet (5 mg total) by mouth every 12 (twelve) hours. 180 tablet 1     ezetimibe (ZETIA) 10 MG tablet Take 10 mg by mouth daily       levothyroxine (SYNTHROID, LEVOTHROID) 88 MCG tablet [LEVOTHYROXINE (SYNTHROID, LEVOTHROID) 88 MCG TABLET] Take 88 mcg by mouth daily.       metFORMIN (GLUCOPHAGE) 500 MG tablet [METFORMIN (GLUCOPHAGE) 500 MG TABLET] Take 1,000 mg by mouth 2 (two) times a day with meals.       pregabalin (LYRICA) 75 MG capsule [PREGABALIN (LYRICA) 75 MG CAPSULE] Take 75 mg by mouth daily.        simvastatin (ZOCOR) 20 MG tablet [SIMVASTATIN (ZOCOR) 20 MG TABLET] Take 20 mg by mouth bedtime.          No Known Allergies    Social History     Socioeconomic History     Marital status:      Spouse name: Not on file     Number of children: Not on file     Years of education: Not on file     Highest education level: Not on file   Occupational History     Not on file   Tobacco Use     Smoking status: Never Smoker     Smokeless tobacco: Never Used   Substance and Sexual Activity     Alcohol use: No     Alcohol/week: 1.0 standard drinks     Drug use: No     Sexual activity: Not on file   Other Topics Concern     Not on file   Social History Narrative     Not on file     Social Determinants of Health     Financial Resource Strain:      Difficulty of Paying Living Expenses:    Food Insecurity:      Worried About Running Out of Food in the Last Year:      Ran Out of Food in the Last Year:    Transportation Needs:      Lack of Transportation (Medical):      Lack of Transportation (Non-Medical):    Physical Activity:      Days of Exercise per  Week:      Minutes of Exercise per Session:    Stress:      Feeling of Stress :    Social Connections:      Frequency of Communication with Friends and Family:      Frequency of Social Gatherings with Friends and Family:      Attends Nondenominational Services:      Active Member of Clubs or Organizations:      Attends Club or Organization Meetings:      Marital Status:    Intimate Partner Violence:      Fear of Current or Ex-Partner:      Emotionally Abused:      Physically Abused:      Sexually Abused:        Family History   Problem Relation Age of Onset     Cancer Mother         uterine     Diabetes Maternal Aunt      Urolithiasis No family hx of      Clotting Disorder No family hx of      Gout No family hx of      Heart Disease No family hx of      Anesthesia Reaction No family hx of        Objective:     No vitals or physical exam obtained due to virtual visit  Labs

## 2021-11-01 NOTE — PROGRESS NOTES
Patient is roomed via telephone for a virtual visit.  Patient confirmed he is in the [facility] at the time of this appointment.  Patient understands that this virtual visit is billable and agree to proceed with appointment.

## 2021-11-01 NOTE — PATIENT INSTRUCTIONS
Patient Stated Goal: Prevent further stones  Oxalate Food List    See Handout:  Do not eat foods containing more than 50 mg oxalate per 100 gm serving.  Foods containing between 5-50mg should be eaten in moderation (a single 4oz. serving per day).  Remember the purpose of the low oxalate diet is to avoid supersaturation (excess concentration) of the urine with oxalate; therefore small amounts periodically are less harmful than single large amounts.

## 2022-01-04 ENCOUNTER — LAB REQUISITION (OUTPATIENT)
Dept: LAB | Facility: CLINIC | Age: 67
End: 2022-01-04

## 2022-01-04 DIAGNOSIS — E78.2 MIXED HYPERLIPIDEMIA: ICD-10-CM

## 2022-01-04 LAB
CHOLEST SERPL-MCNC: 203 MG/DL
FASTING STATUS PATIENT QL REPORTED: ABNORMAL
HDLC SERPL-MCNC: 39 MG/DL
LDLC SERPL CALC-MCNC: 143 MG/DL
TRIGL SERPL-MCNC: 105 MG/DL

## 2022-01-04 PROCEDURE — 80061 LIPID PANEL: CPT | Performed by: FAMILY MEDICINE

## 2022-02-08 ENCOUNTER — LAB REQUISITION (OUTPATIENT)
Dept: LAB | Facility: CLINIC | Age: 67
End: 2022-02-08

## 2022-02-08 DIAGNOSIS — Z12.5 ENCOUNTER FOR SCREENING FOR MALIGNANT NEOPLASM OF PROSTATE: ICD-10-CM

## 2022-02-08 LAB — PSA SERPL-MCNC: 1.97 UG/L (ref 0–4.5)

## 2022-02-08 PROCEDURE — G0103 PSA SCREENING: HCPCS | Performed by: FAMILY MEDICINE

## 2022-03-23 ENCOUNTER — TELEPHONE (OUTPATIENT)
Dept: CARDIOLOGY | Facility: CLINIC | Age: 67
End: 2022-03-23
Payer: COMMERCIAL

## 2022-03-23 NOTE — TELEPHONE ENCOUNTER
[facility]    Phone Message    May a detailed message be left on voicemail: yes     Reason for Call: Other: [facility] called about a 3 day hold on Eliquis medication for [place] on 4/20/2022     Action Taken: Message routed to:  [facility] clinical pool    Travel Screening: Not Applicable

## 2022-03-23 NOTE — TELEPHONE ENCOUNTER
Pt has not been seen in clinic since 1-7-20, last refill for Eliquis sent on 6-23-20.  Unable to give hold order as patient has not been seen in the last 12 months and it does not appear that we are filling this medication.      Phone call to [facility] and reviewed this information, they will follow up with the patient.

## 2022-03-28 ENCOUNTER — LAB REQUISITION (OUTPATIENT)
Dept: LAB | Facility: CLINIC | Age: 67
End: 2022-03-28

## 2022-03-28 DIAGNOSIS — E53.8 DEFICIENCY OF OTHER SPECIFIED B GROUP VITAMINS: ICD-10-CM

## 2022-03-28 LAB — VIT B12 SERPL-MCNC: 284 PG/ML (ref 213–816)

## 2022-03-28 PROCEDURE — 82607 VITAMIN B-12: CPT | Performed by: FAMILY MEDICINE

## 2022-09-07 ENCOUNTER — LAB REQUISITION (OUTPATIENT)
Dept: LAB | Facility: CLINIC | Age: 67
End: 2022-09-07

## 2022-09-07 DIAGNOSIS — E11.40 TYPE 2 DIABETES MELLITUS WITH DIABETIC NEUROPATHY, UNSPECIFIED (H): ICD-10-CM

## 2022-09-07 DIAGNOSIS — E03.9 HYPOTHYROIDISM, UNSPECIFIED: ICD-10-CM

## 2022-09-07 LAB
ALBUMIN SERPL BCG-MCNC: 4.4 G/DL (ref 3.5–5.2)
ALP SERPL-CCNC: 110 U/L (ref 40–129)
ALT SERPL W P-5'-P-CCNC: 25 U/L (ref 10–50)
ANION GAP SERPL CALCULATED.3IONS-SCNC: 12 MMOL/L (ref 7–15)
AST SERPL W P-5'-P-CCNC: 18 U/L (ref 10–50)
BILIRUB SERPL-MCNC: 0.4 MG/DL
BUN SERPL-MCNC: 15 MG/DL (ref 8–23)
CALCIUM SERPL-MCNC: 9.6 MG/DL (ref 8.8–10.2)
CHLORIDE SERPL-SCNC: 101 MMOL/L (ref 98–107)
CREAT SERPL-MCNC: 1.12 MG/DL (ref 0.67–1.17)
DEPRECATED HCO3 PLAS-SCNC: 27 MMOL/L (ref 22–29)
GFR SERPL CREATININE-BSD FRML MDRD: 72 ML/MIN/1.73M2
GLUCOSE SERPL-MCNC: 185 MG/DL (ref 70–99)
POTASSIUM SERPL-SCNC: 4.3 MMOL/L (ref 3.4–5.3)
PROT SERPL-MCNC: 7 G/DL (ref 6.4–8.3)
SODIUM SERPL-SCNC: 140 MMOL/L (ref 136–145)
TSH SERPL DL<=0.005 MIU/L-ACNC: 2.87 UIU/ML (ref 0.3–4.2)

## 2022-09-07 PROCEDURE — 84443 ASSAY THYROID STIM HORMONE: CPT | Performed by: FAMILY MEDICINE

## 2022-09-07 PROCEDURE — 80053 COMPREHEN METABOLIC PANEL: CPT | Performed by: FAMILY MEDICINE

## 2022-09-07 PROCEDURE — 82040 ASSAY OF SERUM ALBUMIN: CPT | Performed by: FAMILY MEDICINE

## 2023-02-28 ENCOUNTER — TRANSFERRED RECORDS (OUTPATIENT)
Dept: HEALTH INFORMATION MANAGEMENT | Facility: CLINIC | Age: 68
End: 2023-02-28
Payer: COMMERCIAL

## 2023-02-28 LAB — RETINOPATHY: NEGATIVE

## 2023-03-01 ENCOUNTER — TRANSFERRED RECORDS (OUTPATIENT)
Dept: HEALTH INFORMATION MANAGEMENT | Facility: CLINIC | Age: 68
End: 2023-03-01
Payer: COMMERCIAL

## 2023-03-01 ENCOUNTER — LAB REQUISITION (OUTPATIENT)
Dept: LAB | Facility: CLINIC | Age: 68
End: 2023-03-01

## 2023-03-01 DIAGNOSIS — E53.8 DEFICIENCY OF OTHER SPECIFIED B GROUP VITAMINS: ICD-10-CM

## 2023-03-01 DIAGNOSIS — I48.0 PAROXYSMAL ATRIAL FIBRILLATION (H): ICD-10-CM

## 2023-03-01 DIAGNOSIS — E78.2 MIXED HYPERLIPIDEMIA: ICD-10-CM

## 2023-03-01 LAB
ANION GAP SERPL CALCULATED.3IONS-SCNC: 16 MMOL/L (ref 7–15)
BUN SERPL-MCNC: 15.3 MG/DL (ref 8–23)
CALCIUM SERPL-MCNC: 9.9 MG/DL (ref 8.8–10.2)
CHLORIDE SERPL-SCNC: 100 MMOL/L (ref 98–107)
CHOLEST SERPL-MCNC: 286 MG/DL
CREAT SERPL-MCNC: 1.13 MG/DL (ref 0.67–1.17)
DEPRECATED HCO3 PLAS-SCNC: 26 MMOL/L (ref 22–29)
GFR SERPL CREATININE-BSD FRML MDRD: 71 ML/MIN/1.73M2
GLUCOSE SERPL-MCNC: 182 MG/DL (ref 70–99)
HBA1C MFR BLD: 7.2 % (ref 4.2–6.1)
HDLC SERPL-MCNC: 43 MG/DL
LDLC SERPL CALC-MCNC: 206 MG/DL
NONHDLC SERPL-MCNC: 243 MG/DL
POTASSIUM SERPL-SCNC: 4.9 MMOL/L (ref 3.4–5.3)
SODIUM SERPL-SCNC: 142 MMOL/L (ref 136–145)
TRIGL SERPL-MCNC: 187 MG/DL
VIT B12 SERPL-MCNC: 527 PG/ML (ref 232–1245)

## 2023-03-01 PROCEDURE — 82607 VITAMIN B-12: CPT | Performed by: FAMILY MEDICINE

## 2023-03-01 PROCEDURE — 80048 BASIC METABOLIC PNL TOTAL CA: CPT | Performed by: FAMILY MEDICINE

## 2023-03-01 PROCEDURE — 80061 LIPID PANEL: CPT | Performed by: FAMILY MEDICINE

## 2023-03-06 ENCOUNTER — TELEPHONE (OUTPATIENT)
Dept: CARDIOLOGY | Facility: CLINIC | Age: 68
End: 2023-03-06
Payer: COMMERCIAL

## 2023-03-06 NOTE — TELEPHONE ENCOUNTER
"Patient office visit with Dr. Shi Sneed from 3/1/23 states \"Consideration for atrial appendage device closure, but he doesn't want to do this. Considering ablation. Has not been high risk for anticoagulants thus far. No issues with bleeding.\" Attempted to call patient to clarify if he wants to be seen for LAAC or follow-up with Ernie to discuss ablation.  for return call. LK  "

## 2023-03-06 NOTE — TELEPHONE ENCOUNTER
MELODY left for writer by patient. Phone call to patient to discuss LAAC referral. He is interested in learning more. He has not been seen by our clinic since 2020. Discussed that he needs formal LAAC consult to determine if he is a candidate and if imaging required for LAAC. Patient also interested in having information sent to them via mail. Will send pamphlet to patient and have scheduling call to arrange LAAC consult with Skyler Hermosillo, per his request (he states he has seen Skyler in the past). He is appreciative. No further questions at this time. LAAC pamphlet sent to patient address on file.     Just by writer's brief overview of patient chart, it appears his [id number] score may only be two. Will defer to LAAC Consult to see what [id number] score is and plan moving forward for LAAC. HODA

## 2023-03-06 NOTE — TELEPHONE ENCOUNTER
"Fax referral received from [facility] c/o Dr. Shi Sneed. Reason: \"Follow up a fib, possible watchman device, CONSULT DX, TEST, AND TREAT.\" Writer has requested medical records. [facility]  "

## 2023-03-21 ENCOUNTER — TRANSFERRED RECORDS (OUTPATIENT)
Dept: HEALTH INFORMATION MANAGEMENT | Facility: CLINIC | Age: 68
End: 2023-03-21
Payer: COMMERCIAL

## 2023-03-21 ENCOUNTER — OFFICE VISIT (OUTPATIENT)
Dept: PHARMACY | Facility: PHYSICIAN GROUP | Age: 68
End: 2023-03-21
Payer: COMMERCIAL

## 2023-03-21 DIAGNOSIS — E11.9 TYPE 2 DIABETES MELLITUS WITHOUT COMPLICATION, WITHOUT LONG-TERM CURRENT USE OF INSULIN (H): Primary | ICD-10-CM

## 2023-03-21 DIAGNOSIS — I48.0 PAROXYSMAL ATRIAL FIBRILLATION (H): ICD-10-CM

## 2023-03-21 DIAGNOSIS — E78.5 HYPERLIPIDEMIA LDL GOAL <100: ICD-10-CM

## 2023-03-21 DIAGNOSIS — R39.14 BENIGN PROSTATIC HYPERPLASIA WITH INCOMPLETE BLADDER EMPTYING: ICD-10-CM

## 2023-03-21 DIAGNOSIS — E03.9 ACQUIRED HYPOTHYROIDISM: ICD-10-CM

## 2023-03-21 DIAGNOSIS — N40.1 BENIGN PROSTATIC HYPERPLASIA WITH INCOMPLETE BLADDER EMPTYING: ICD-10-CM

## 2023-03-21 DIAGNOSIS — Z13.820 SCREENING FOR OSTEOPOROSIS: ICD-10-CM

## 2023-03-21 DIAGNOSIS — G62.9 NEUROPATHY: ICD-10-CM

## 2023-03-21 LAB
ALBUMIN (URINE) MG/SPEC: 30 MG/DL
ALBUMIN (URINE) MG/SPEC: 30 MG/L
ALBUMIN/CREATININE RATIO: <30 MG/G
ALBUMIN/CREATININE RATIO: <30 MG/G
CREATININE (URINE): 200 MG/DL (ref 10–300)
CREATININE (URINE): 200 MG/DL (ref 10–300)

## 2023-03-21 PROCEDURE — 99605 MTMS BY PHARM NP 15 MIN: CPT | Performed by: PHARMACIST

## 2023-03-21 PROCEDURE — 99607 MTMS BY PHARM ADDL 15 MIN: CPT | Performed by: PHARMACIST

## 2023-03-21 NOTE — PROGRESS NOTES
Medication Therapy Management (MTM) Encounter    ASSESSMENT:                            Medication Adherence/Access: [employer] not excepting new applicants for Trulicity at this time due to medication shortage.  Patient agreeable to pay $47 per month for Trulicity until [employer] opens new enrollment.  [employer] excepting new applications for Ozempic, patient declined (see below).    Type 2 Diabetes: A1c not at goal less than 7%.  Recommend taking metformin with food to help reduce diarrhea.  Metformin optimized at 2 g daily for macrovascular benefit.  Patient would benefit starting GLP-1 for weight management and blood sugar control.  Recommended Mounjaro or Ozempic as  most compelling weight loss data.  Patient declined Ozempic due to fear of needles and not being able to administer due to visible needle.  Mounjaro cost investigated with pharmacy - >$300/30 days -not affordable.  Patient to start Trulicity. Completed teaching of administration of Trulicity. Discussed mechanism of action, side effects, warnings, and efficacy. Discussed appropriate storage and stability. Answered patient questions. Education provided on CGM start today. Educated provided to patient on CGM use and application. This included Freestyle Jose sensor: insertion technique, sensor site location and rotation, insulin administration in relation to sensor placement, sensor wear, reasons to remove sensor (MRI, CT, diathermy), Vitamin C & Aspirin effects on sensor. Education also provided on hypoglycemia awareness and treatment.  CGM started today.  Discussed benefit of statin and aspirin with comorbid diabetes today.  Patient declined starting both (see below), will consider.  Due for hep B series -patient to consider.  Due for microalbumin -collected today.        3/23 -urine albumin to creatinine ratio at goal less than 30.  No compelling indication for ACE/ARB.  Blood pressure at goal.    Hyperlipidemia: Not at goal.  Patient not on  moderate intensity statin with comorbid diabetes and LDL not at goal less than 100 mg/dL.  Some benefit to cholesterol expected secondary to appetite management with starting Trulicity, however not likely to reduce LDL to goal alone.  Discussed rosuvastatin as option for patient with lower side effect risk due to being least lipophilic statin.  Would recommend starting at every other day dosing and follow-up in 6 to 8 weeks for repeat fasting lipid panel.  Patient to consider this -since starting other medications today, will consider at upcoming visit.    Afib: Stable.  Blood pressure at goal less than 130/80 mmHg.    Hypothyroidism: Stable.    BPH: Education today provided on tamsulosin to monitor for dizziness when starting as may have some systemic impact on blood pressure.  Best to take at bedtime.  Educated on efficacy to help with urinary symptoms.  Patient agreeable and will start today.    Neuropathy/restless legs: Stable.    Osteoporosis Prevention: Patient not meeting goal 1000 IU vitamin D daily.  We will draw vitamin D level after 3 months on therapy. believe patient to be meeting 1200 mg calcium daily in diet.  Patient to focus on this.    PLAN:                            1. Start Trulicity 0.75 mg once weekly - Sunday  2. Take Metformin with food to help with diarrhea  3. Start Tamsulosin 0.4 mg at bedtime   4. Will consider starting every other day  Rosuvastatin at next visit  5. start vitaminD 1000 international units  daily    Follow-up: 4/18 with Shazia Ash    SUBJECTIVE/OBJECTIVE:                          Khari Jamison is a 67 year old male coming in for an initial visit. He was referred to me from Shi Sneed MD. Patient was accompanied by Wife, Kenyetta.     Reason for visit: Medication Therapy Management -marimar education/diabetes.    Allergies/ADRs: Reviewed in chart  Past Medical History: Reviewed in chart  Tobacco: He reports that he has never smoked. He has never used  smokeless tobacco.       Medication Adherence/Access: Patient able to pay $47 for Trulicity short-term as he feels this is important to his wellbeing in managing diabetes.  Would like to apply for patient assistance program longitudinally to help with affordability.\    Type 2 Diabetes:   Metformin 500 mg ER tablets -2 tablets twice daily    Patient notes occasional diarrhea with metformin.  Does not take with food.  Blood sugar monitoring: rarely.  Brings a CGM to start today to monitor blood sugars.  Symptoms of low blood sugar? none  Symptoms of high blood sugar? none  Eye exam: up to date -February 2023  Foot exam: up to date -September 2022  Diet/Exercise: Patient works to limit carbohydrates/sweets and eat healthfully.  Goal is to manage blood sugar and weight  Aspirin: Not taking due to prefers not to take  Statin: No -side effects to simvastatin and Zetia  ACEi/ARB: No.   Urine Albumin: No results found for: UMALCR           Hyperlipidemia:   Not currently taking medications due to history of side effects with simvastatin and Zetia.  Patient is open to considering other options.   The 10-year ASCVD risk score (Lisbeth FRANCIS, et al., 2019) is: 31.4%    Values used to calculate the score:      Age: 67 years      Sex: Male      Is Non- : No      Diabetic: Yes      Tobacco smoker: No      Systolic Blood Pressure: 120 mmHg      Is BP treated: No      HDL Cholesterol: 43 mg/dL      Total Cholesterol: 286 mg/dL  Recent Labs   Lab Test 03/01/23  1319 01/04/22  0940   CHOL 286* 203*   HDL 43 39*   * 143*   TRIG 187* 105     Afib:   Eliquis 5mg twice daily for anticoagulation.     Patient reports no current concerns of bruising or bleeding. Patient does not have a history of GI bleed. Patient reports no current medication side effects.   Patient does not self-monitor blood pressure.      Hypothyroidism:  Levothyroxine 88 mcg daily.     Patient is having the following symptoms: none.   TSH    Date Value Ref Range Status   09/07/2022 2.87 0.30 - 4.20 uIU/mL Final   10/20/2021 3.22 0.30 - 5.00 uIU/mL Final         BPH:   Tamsulosin 0.4 mg capsule once daily    Patient recently started on tamsulosin by PCP.  Patient symptoms include frequent urination, difficulty emptying bladder and interrupted void stream.  Patient has not started this yet as wanted to discuss safety and efficacy with Pharm.D. today.    Neuropathy/restless legs:   Pregabalin 75 mg capsule once daily    Works well.  Denies side effects.    Osteoporosis Prevention:  Patient is getting the following sources of dietary calcium: Dairy and green leafy vegetables  Last vitamin D level:  No results found for: VITDT    Today's Vitals: /60   Pulse 81   Wt 324 lb 6.4 oz (147.1 kg)   BMI 42.80 kg/m    ----------------      I spent 60 minutes with this patient today. All changes were made via collaborative practice agreement with Shi Sneed MD. A copy of the visit note was provided to the patient's provider(s).    A summary of these recommendations was mailed.    Nilsa Mata, Pharm D., MPH  MTM Pharmacist - [facility]  Secure Voicemail: [phone]  In Office: Monday - Thursday           Medication Therapy Recommendations  Screening for osteoporosis    Rationale: Preventive therapy - Needs additional medication therapy - Indication   Recommendation: Start Medication - Start vitamin D 1000 units once daily   Status: Accepted per CPA         Type 2 diabetes mellitus without complication, without long-term current use of insulin (H)    Current Medication: Continuous Blood Gluc Sensor (FREESTYLE OLEGARIO 2 SENSOR) MISC   Rationale: Does not understand instructions - Adherence - Adherence   Recommendation: Provide Education - Education provided for CGM use   Status: Patient Agreed - Adherence/Education          Current Medication: metFORMIN (GLUCOPHAGE) 500 MG tablet   Rationale: Undesirable effect - Adverse medication event - Safety    Recommendation: Provide Education - Take metformin with food to reduce diarrhea   Status: Patient Agreed - Adherence/Education          Current Medication: metFORMIN (GLUCOPHAGE) 500 MG tablet   Rationale: Synergistic therapy - Needs additional medication therapy - Indication   Recommendation: Start Medication - Start Trulicity   Status: Accepted per CPA

## 2023-03-21 NOTE — LETTER
_  Medication List        Prepared on: Mar 21, 2023     Bring your Medication List when you go to the doctor, hospital, or   emergency room. And, share it with your family or caregivers.     Note any changes to how you take your medications.  Cross out medications when you no longer use them.    Medication How I take it Why I use it Prescriber   apixaban ANTICOAGULANT (ELIQUIS) 5 mg Tab tablet [APIXABAN ANTICOAGULANT (ELIQUIS) 5 MG TAB TABLET] Take 1 tablet (5 mg total) by mouth every 12 (twelve) hours. Atrial Fibrillation (H) Vincent Klein MD   Continuous Blood Gluc Sensor (FREESTYLE OLEGARIO 2 SENSOR) MISC 1 each every 14 days Change every 14 days.   Diabetes Shi Sneed MD     dulaglutide (TRULICITY) 0.75 MG/0.5ML pen Inject 0.75 mg Subcutaneous every 7 days  diabetes Shi Sneed MD     levothyroxine (SYNTHROID, LEVOTHROID) 88 MCG tablet [LEVOTHYROXINE (SYNTHROID, LEVOTHROID) 88 MCG TABLET] Take 88 mcg by mouth daily.   Hypothyroidism Shi Sneed MD     metFORMIN (GLUCOPHAGE) 500 MG tablet [METFORMIN (GLUCOPHAGE) 500 MG TABLET] Take 1,000 mg by mouth 2 (two) times a day with meals.   Diabetes Shi Sneed MD     pregabalin (LYRICA) 75 MG capsule [PREGABALIN (LYRICA) 75 MG CAPSULE] Take 75 mg by mouth daily.    Neuropathy Shi Sneed MD     tamsulosin (FLOMAX) 0.4 MG capsule Take 0.4 mg by mouth daily  BPH/urination Shi Sneed MD     Vitamin D3 (VITAMIN D, CHOLECALCIFEROL,) 25 mcg (1000 units) tablet Take 1 tablet by mouth daily  bone health Shi Sneed MD           Add new medications, over-the-counter drugs, herbals, vitamins, or  minerals in the blank rows below.    Medication How I take it Why I use it Prescriber                                      Allergies:      ezetimibe; simvastatin        Side effects I have had:               Other Information:              My notes  and questions:

## 2023-03-21 NOTE — LETTER
"Recommended To-Do List      Prepared on: Mar 21, 2023       You can get the best results from your medications by completing the items on this \"To-Do List.\"      Bring your To-Do List when you go to your doctor. And, share it with your family or caregivers.    My To-Do List:  What we talked about: What I should do:   A new medication that may be of benefit to you    Start taking  Trulicity 0.75 mg once weekly - Sunday          What we talked about: What I should do:   The importance of taking your medication as intended    Education: Start Freestyle Jose 2 to monitor blood sugars.  You must scan at least every 8 hours to maintain data.  Replaced every 14 days.          What we talked about: What I should do:   An issue with your medication    Education: Take Metformin with food to help with diarrhea           What we talked about: What I should do:   A new medication that may be of benefit to you    Start taking vitamin D3 -1000 international units daily          What we talked about: What I should do:                       "

## 2023-03-21 NOTE — LETTER
March 29, 2023  Khari Jamison  [address]  [facility] [address]    Dear Mr. Jamison, [facility]     Thank you for talking with me on Mar 21, 2023 about your health and medications. As a follow-up to our conversation, I have included two documents:      1. Your Recommended To-Do List has steps you should take to get the best results from your medications.  2. Your Medication List will help you keep track of your medications and how to take them.    If you want to talk about these documents, please call Nilsa Mata RPH at phone: [phone], Monday-Friday 8-4:30pm.    I look forward to working with you and your doctors to make sure your medications work well for you.    Sincerely,  Nilsa Mata RPH  [facility] Pharmacist, [facility]

## 2023-03-22 NOTE — TELEPHONE ENCOUNTER
----- Message -----  From: Damaris Mena  Sent: 3/22/2023  10:19 AM CDT  To: Dejah Hurtado RN    4/19  ----- Message -----  From: Dejah Hurtado RN  Sent: 3/20/2023  10:27 AM CDT  To: Damaris Mena    ----- Message from Dejah Hurtado RN sent at 3/20/2023 10:27 AM CDT -----  Ketan Ocampo-  Could we schedule consult appointment with Skyler armstrong?   Thank you,  Dejah  ----- Message from Sobia Montes RN sent at 3/6/2023  2:37 PM CST -----  Please call patient for LAAC consult with Skyler only per his request, thanks!    Sobia  ----- Message from Sobia Montes RN sent at 3/6/2023  9:29 AM CST -----  Please obtain most recent office visit from Dr. Shi Sneed of [facility] in [place]. Thanks!    Sobia

## 2023-03-29 VITALS
BODY MASS INDEX: 42.8 KG/M2 | WEIGHT: 315 LBS | DIASTOLIC BLOOD PRESSURE: 60 MMHG | HEART RATE: 81 BPM | SYSTOLIC BLOOD PRESSURE: 120 MMHG

## 2023-03-29 RX ORDER — VITAMIN B COMPLEX
1 TABLET ORAL EVERY MORNING
COMMUNITY

## 2023-03-29 RX ORDER — TAMSULOSIN HYDROCHLORIDE 0.4 MG/1
0.4 CAPSULE ORAL AT BEDTIME
COMMUNITY
End: 2023-08-14 | Stop reason: SINTOL

## 2023-04-18 ENCOUNTER — OFFICE VISIT (OUTPATIENT)
Dept: PHARMACY | Facility: PHYSICIAN GROUP | Age: 68
End: 2023-04-18
Payer: COMMERCIAL

## 2023-04-18 DIAGNOSIS — E66.01 MORBID OBESITY (H): ICD-10-CM

## 2023-04-18 DIAGNOSIS — R39.14 BENIGN PROSTATIC HYPERPLASIA WITH INCOMPLETE BLADDER EMPTYING: Primary | ICD-10-CM

## 2023-04-18 DIAGNOSIS — N40.1 BENIGN PROSTATIC HYPERPLASIA WITH INCOMPLETE BLADDER EMPTYING: Primary | ICD-10-CM

## 2023-04-18 DIAGNOSIS — E11.9 TYPE 2 DIABETES MELLITUS WITHOUT COMPLICATION, WITHOUT LONG-TERM CURRENT USE OF INSULIN (H): ICD-10-CM

## 2023-04-18 PROCEDURE — 99606 MTMS BY PHARM EST 15 MIN: CPT | Performed by: PHARMACIST

## 2023-04-18 PROCEDURE — 99607 MTMS BY PHARM ADDL 15 MIN: CPT | Performed by: PHARMACIST

## 2023-04-18 NOTE — PATIENT INSTRUCTIONS
"Recommendations from MTM Pharmacist visit:                                                    MTM (medication therapy management) is a service provided by a clinical pharmacist designed to help you get the most of out of your medicines.      Increase Trulicity to 1.5 mg weekly  Take tamsulosin on empty stomach after summary    Follow-up: 5/16 at 11:30 am with Nilsa, pharmacist at [facility]     It was great speaking with you today.  I value your experience and would be very thankful for your time in providing feedback in our clinic survey. In the next few days, you may receive an email or text message from [employer] with a link to a survey related to your  clinical pharmacist.\"     To schedule another MTM appointment, please call the clinic directly or you may call the MTM scheduling line at [phone] or toll-free at [phone].     My Clinical Pharmacist's contact information:                                                      Please feel free to contact me with any questions or concerns you have.      Nilsa Mata, Pharm D., MPH  MTM Pharmacist - [facility]  Secure Voicemail: [phone]  Days in the office: Monday - Thursday          "

## 2023-04-18 NOTE — PROGRESS NOTES
Medication Therapy Management (MTM) Encounter    ASSESSMENT:                              Medication Adherence/Access: [employer] not excepting new applicants for Trulicity at this time due to medication shortage.  Patient agreeable to pay $47 per month for Trulicity until [employer] opens new enrollment.  [employer] excepting new applications for Ozempic, patient declined.    Type 2 Diabetes: A1c not at goal less than 7%.  Metformin optimized at 2 g daily for macrovascular benefit.  Patient would benefit from optimizing GLP-1 for weight management and blood sugar control.  Tolerating Trulicity well, increase Trulicity to 1.5 mg weekly.  Discussed benefit of statin and aspirin with comorbid diabetes today.  Patient declined starting both, will consider.  Due for hep B series -patient to consider.  No microalbuminuria, ACE inhibitor not indicated.      BPH: Educated on retrograde ejaculation and low risk for harm.  Patient offered to switch to alfuzosin as less risk of retrograde ejaculation.  Patient declined today as would like to use up tamsulosin and monitor symptoms as urinary symptoms have significantly improved.  Unclear why patient experiencing cool extremities, potentially may be from dilation and constriction of small vessels in hands.  Recommend to take tamsulosin consistently at least 30 minutes after meal to help determine if consistent dosing helps benefit these effects.    PLAN:                            1.  Increase Trulicity to 1.5 mg weekly  2.  Take tamsulosin at least 30 minutes after evening meal    Follow-up: 5/16/23 at 11:30 AM with Nilsa Mata, pharmacist.  We will discuss cholesterol therapy at this visit.      SUBJECTIVE/OBJECTIVE:                          Khari Jamison is a 67 year old male coming in for a follow-up visit. Patient was accompanied by wife, Kenyetta. Today's visit is a follow-up MTM visit from 3/21/23     Reason for visit: Medication Therapy Management .    Allergies/ADRs:  "Reviewed in chart  Past Medical History: Reviewed in chart  Tobacco: He reports that he has never smoked. He has never used smokeless tobacco.  Alcohol: Less than 1 beverages / week  Caffeine: Not currently using        Medication Adherence/Access: Patient able to pay $47 for Trulicity short-term as he feels this is important to his wellbeing in managing diabetes.  Would like to apply for patient assistance program longitudinally to help with affordability.    Type 2 Diabetes:   Metformin 500 mg ER tablets -2 tablets twice daily  Trulicity 0.75 mg - once weekly on Sundays (x 3 weeks)    Diarrhea improved taking metformin with food.  Denies side effects with Trulicity.  Does not take with food.  Blood sugar monitoring: rarely.  Brings a CGM to start today to monitor blood sugars.  Symptoms of low blood sugar? none  Symptoms of high blood sugar? none  Eye exam: up to date -February 2023  Foot exam: up to date -September 2022  Diet/Exercise: Patient works to limit carbohydrates/sweets and eat healthfully.  Goal is to manage blood sugar and weight  Aspirin: Not taking due to prefers not to take  Statin: No -side effects to simvastatin and Zetia  ACEi/ARB: No.                     BPH:   Tamsulosin 0.4 mg capsule once daily around dinnertime    Patient notes improvement in urinary symptoms with starting tamsulosin.  Does note occasional retrograde ejaculation -denies this being painful or concerning as no goal for pregnancy.  Patient symptoms include frequent urination, difficulty emptying bladder and interrupted void stream -all improved, somewhat inconsistent results.  Patient notes some increased sensation of cold extremities since starting tamsulosin.    Today's Vitals: /76   Pulse 65   Ht 6' 1\" (1.854 m)   Wt (!) 343 lb 3.2 oz (155.7 kg)   BMI 45.28 kg/m    ----------------      I spent 30 minutes with this patient today. All changes were made via collaborative practice agreement with Shi Sneed MD. A " copy of the visit note was provided to the patient's provider(s).    A summary of these recommendations was given to the patient.    Nilsa Mata, Pharm D., MPH  MTM Pharmacist - [facility]  Secure Voicemail: [phone]  In Office: Monday - Thursday         Medication Therapy Recommendations  Benign prostatic hyperplasia with incomplete bladder emptying    Current Medication: tamsulosin (FLOMAX) 0.4 MG capsule   Rationale: Does not understand instructions - Adherence - Adherence   Recommendation: Provide Education - Educate to take 30 minutes after meal for consistent absorption   Status: Patient Agreed - Adherence/Education         Type 2 diabetes mellitus without complication, without long-term current use of insulin (H)    Current Medication: dulaglutide (TRULICITY) 0.75 MG/0.5ML pen (Discontinued)   Rationale: Dose too low - Dosage too low - Effectiveness   Recommendation: Increase Dose - Increase Trulicity to 1.5 mg weekly   Status: Accepted per CPA

## 2023-04-19 ENCOUNTER — OFFICE VISIT (OUTPATIENT)
Dept: CARDIOLOGY | Facility: CLINIC | Age: 68
End: 2023-04-19
Payer: COMMERCIAL

## 2023-04-19 VITALS
OXYGEN SATURATION: 95 % | BODY MASS INDEX: 45.65 KG/M2 | RESPIRATION RATE: 20 BRPM | DIASTOLIC BLOOD PRESSURE: 86 MMHG | SYSTOLIC BLOOD PRESSURE: 146 MMHG | HEART RATE: 87 BPM | WEIGHT: 315 LBS

## 2023-04-19 DIAGNOSIS — G47.33 OSA ON CPAP: ICD-10-CM

## 2023-04-19 DIAGNOSIS — I48.19 PERSISTENT ATRIAL FIBRILLATION (H): Primary | ICD-10-CM

## 2023-04-19 DIAGNOSIS — I48.4 ATYPICAL ATRIAL FLUTTER (H): ICD-10-CM

## 2023-04-19 DIAGNOSIS — I10 ESSENTIAL HYPERTENSION: ICD-10-CM

## 2023-04-19 LAB
ANION GAP SERPL CALCULATED.3IONS-SCNC: 11 MMOL/L (ref 5–18)
BUN SERPL-MCNC: 14 MG/DL (ref 8–22)
CALCIUM SERPL-MCNC: 9.7 MG/DL (ref 8.5–10.5)
CHLORIDE BLD-SCNC: 106 MMOL/L (ref 98–107)
CO2 SERPL-SCNC: 24 MMOL/L (ref 22–31)
CREAT SERPL-MCNC: 1.02 MG/DL (ref 0.7–1.3)
GFR SERPL CREATININE-BSD FRML MDRD: 81 ML/MIN/1.73M2
GLUCOSE BLD-MCNC: 146 MG/DL (ref 70–125)
POTASSIUM BLD-SCNC: 4.3 MMOL/L (ref 3.5–5)
SODIUM SERPL-SCNC: 141 MMOL/L (ref 136–145)

## 2023-04-19 PROCEDURE — 36415 COLL VENOUS BLD VENIPUNCTURE: CPT | Performed by: NURSE PRACTITIONER

## 2023-04-19 PROCEDURE — 99215 OFFICE O/P EST HI 40 MIN: CPT | Performed by: NURSE PRACTITIONER

## 2023-04-19 PROCEDURE — 80048 BASIC METABOLIC PNL TOTAL CA: CPT | Performed by: NURSE PRACTITIONER

## 2023-04-19 RX ORDER — LOSARTAN POTASSIUM 25 MG/1
25 TABLET ORAL DAILY
Qty: 90 TABLET | Refills: 3 | Status: SHIPPED | OUTPATIENT
Start: 2023-04-19 | End: 2023-08-24

## 2023-04-19 NOTE — LETTER
April 20, 2023      Khari WOODS Dopaul  [address] [place] [id number]        Dear ,    I am writing to inform you that your electrolytes and kidney function are normal.        Resulted Orders   Basic metabolic panel   Result Value Ref Range    Sodium 141 136 - 145 mmol/L    Potassium 4.3 3.5 - 5.0 mmol/L    Chloride 106 98 - 107 mmol/L    Carbon Dioxide (CO2) 24 22 - 31 mmol/L    Anion Gap 11 5 - 18 mmol/L    Urea Nitrogen 14 8 - 22 mg/dL    Creatinine 1.02 0.70 - 1.30 mg/dL    Calcium 9.7 8.5 - 10.5 mg/dL    Glucose 146 (H) 70 - 125 mg/dL    GFR Estimate 81 >60 mL/min/1.73m2      Comment:      eGFR calculated using 2021 CKD-EPI equation.       If you have any questions or concerns, please call the clinic at the number listed above.       Sincerely,      MAGDA Pena CNP

## 2023-04-19 NOTE — PROGRESS NOTES
This review    Thank you, Dr. Sneed, for asking the [facility] team to see Mr. Khari Jamison to evaluate persistent atrial fibrillation.    Assessment/Recommendations     Assessment/Plan:    Diagnoses and all orders for this visit:  Persistent atrial fibrillation (H) and Atypical atrial flutter (H) and complains of irregular faster heart rate occurring 1-2 times a month and lasting for only a few minutes at a time.  He is not on any antiarrhythmic since repeat ablation.  GASTON and on CPAP and using all the time.  Discussed today that this is importance for maintenance of sinus rhythm.  Essential hypertension is new diagnosis today.  I have personally reviewed this patient's chart and have spoken with the patient about the treatment options, including CALLY device.   [id number] score of 3 with one-point each for age 65-74, type 2 diabetes and hypertension.  HAS bled score of 2 for advanced age and easy bruising and bleeding on anticoagulation.  Khari Jamison has been advised to stay on chronic anticoagulation due to moderate  risk for stroke with history of atrial fib.  Khari Jamison has heart failure [place] class 2.    I reviewed watchman protocol with  CT to define anatomy and basic metabolic profile done today.  I discussed watchman procedure  and post AMANDEEP.  I reviewed anticoagulation protocol with staying on Eliquis throughout watchman procedure.  I discussed switch from anticoagulation to dual antiplatelet therapy for 6 months and then aspirin 81 mg  mouth daily indefintely.  I discussed post watchman AMANDEEP or CT at 3-4 months after procedure.    He understands that the risks of the procedure are 2% and include, but are not limited to device embolization, air embolism, myocardial perforation, device thrombosis, ASD, stroke, or death.  We discussed expected recovery and follow-up.  After discussion, Khari Jamison wants to proceed with watchman placement with Dr. Klein.      He is not a  good candidate for long-term anticoagulation due to has bled score of 2.         The patient is a good candidate for proceeding with left atrial appendage screening and implant.  All questions were answered to his satisfaction      [id number] score of 2 and on Eliquis.  Follow up in clinic 1 to 2 weeks prior to watchman placement for H&P.     History of Present Illness/Subjective     Khari Jamison is a very pleasant 67 year old male who comes in today for EP follow-up for persistent atrial fibrillation.  Khari Jamison has a known history of persistent atrial fibrillation and had first pulmonary vein isolation ablation in September 2014.  He then had reoccurrence and had a redo PVI in January 2016.  Tyrone  had reoccurrence of atypical atrial flutter on May 16 of 2016 after redo PVI.  He has previously been on sotalol and amiodarone.  He has had palpitations and symptoms of recurrence of A-fib or flutter post redo ablation.  Fortunately these episodes have been infrequent and actually shorter this last year or so.  Tyrone tells me that he has 1-2 episodes a month and they last only a few minutes.  He thinks he may not be symptomatic with all of his episodes.  He reports that his blood pressure has been a little high at other visits.  He denies any other health issues ongoing or other heart symptoms.  He has come in to discuss the watchman because when he bleeds it takes quite a while for it to stop and notes that wounds are slow to heal on Eliquis.  He has very easy bruising on Eliquis as well.    Cardiographics (reviewed):  Echo Transesophageal With Bubble Study [ECH12] 01/27/2016     Narrative     Summary   Normal left ventricular size and systolic function.   Left ventricular ejection fraction is visually estimated to be 55%.   Moderately enlarged left atrium.   No evidence of thrombus within left atrium.            Cardiac testing personally reviewed:  January 2020 2-week symptom monitor showed:  MULTI-DAY  "PATIENT ACTIVATED MONITOR (LAYA) REPORT     Results:    Indication for study: Atrial fibrillation    The monitor was worn: Multi-channel patient activated monitor was worn from January 9 through January 11, 2020.  Compliance with the monitor was excellent.    The baseline rhythm transmission demonstrated normal sinus rhythm with heart rate in the 70s.  ID interval, QRS duration and QT intervals were all normal.  Rare isolated PAC.    Heart rates ranged between 4718 bpm with an average of 60 bpm.    Rare atrial and ventricular ectopy, averaging 1% or less.    The patient had 16 manually activated rhythm recordings.  Symptoms were reported to be \"other\".  The patient's rhythm demonstrated normal sinus rhythm with heart rates ranging between 47 and 80 bpm.  And 9 of the 16 episodes sinus rhythm alone was   detected.  In the other 7 episodes the patient had isolated PACs or PVCs.  There was no evidence of atrial fibrillation or flutter.    The patient had 1 auto triggered recordings.  Symptoms were not reported.  The patient's rhythm demonstrated normal sinus rhythm.     Impression:    Essentially normal 48-hour patient activated monitor    Indication for study: Atrial fibrillation.  Although the patient had symptoms there was no indication of any atrial fibrillation or flutter.  The patient's symptomatic recordings had a possible correlation with simple ectopy however the majority of   the symptomatic recordings demonstrated no ectopy or other pathologic rhythm disturbance.    No evidence of sustained atrial or ventricular tachyarrhythmia    No profound bradycardia or pauses.     Problem List:  Patient Active Problem List   Diagnosis     Morbid obesity with BMI of 45.0-49.9, adult (H)     Type 2 diabetes mellitus without complication, without long-term current use of insulin (H)     Hyperlipidemia LDL goal <100     GASTON on CPAP     Persistent Atrial Fibrillation     Atypical atrial flutter (H)     Calculus of kidney     " Hyperoxaluria     Status post ablation of atrial fibrillation     Revi  e  Physical Examination Review of Systems   w [facility]  There were no vitals taken for this visit.  There is no height or weight on file to calculate BMI.  Wt Readings from Last 3 Encounters:   03/21/23 147.1 kg (324 lb 6.4 oz)   01/07/20 (!) 158.8 kg (350 lb)   07/16/19 (!) 156.1 kg (344 lb 1 oz)     General Appearance:   Alert, well-appearing and in no acute distress.   HEENT: Atraumatic, normocephalic.  No scleral icterus, normal conjunctivae.     Chest: Chest symmetric, spine straight.   Lungs:   Respirations unlabored.   Cardiovascular:   Normal JVD, 1+ bilateral ankle edema.       Extremities: No cyanosis or clubbing   Musculoskeletal: Moves all extremities   Skin: Warm, dry, intact.    Neurologic: Mood and affect are appropriate, alert and oriented to person, place, time, and situation     ROS: 10 point ROS neg other than the symptoms noted above in the HPI.     Medical History  Surgical History Family History Social History     Past Medical History:   Diagnosis Date     Arrhythmia     a-fib     Atrial fibrillation (H)      Back pain      Back pain      Diabetes mellitus (H)      Hyperlipemia      Kidney stone      Obesity      GASTON (obstructive sleep apnea)     CPAP     PONV (postoperative nausea and vomiting)     postoperatively with oral pain meds     Status post ablation of atrial fibrillation 1/27/2016    PVI Sept 2014 (cryo-PVI + roof line + SAMUEL line)    Past Surgical History:   Procedure Laterality Date     CARDIAC ELECTROPHYSIOLOGY MAPPING AND ABLATION  1/27/16    pvi     CARDIOVERSION  10/31/14     CARDIOVERSION  05/16/2016     COLONOSCOPY N/A 11/18/2015    Procedure: COLONOSCOPY WITH POLYPECTOMY USING BIOPSY FORCEP;  Surgeon: Coco Vallecillo MD;  Location: [facility];  Service:      COMBINED CYSTOSCOPY, INSERT STENT URETER(S) Bilateral 8/20/2015    Procedure: CYSTOSCOPY, BILATERAL STENT REMOVAL, LEFT URETEROSCOPY, STONE  EXTRACTION, LEFT STENT INSERTION;  Surgeon: Ansley Jeffery MD;  Location: [facility];  Service:      CYSTOSCOPY W/ LASER LITHOTRIPSY  aug 2015     OTHER SURGICAL HISTORY  9/4/14    pulmonary vein isolation     OTHER SURGICAL HISTORY      eyelid lift     AL ABLATE HEART DYSRHYTHM FOCUS      Description: Catheter Ablation Atrial Fibrillation;  Recorded: 10/08/2014;  Comments: 9/4/14 PVI Cyro to 4 PVs; Roof and SAMUEL lines done.     AL CARDIOVERSION ELECTIVE ARRHYTHMIA EXTERNAL  09/11/2015    Description: Elective Cardioversion External;  Recorded: 03/06/2014;  Comments: 10/11/13; ; 11/27/13 and reverted to afib after 11 days on mod dose sotalol.; ; 1/3/14  sinus on Amio     AL CARDIOVERSION ELECTIVE ARRHYTHMIA EXTERNAL      Description: Elective Cardioversion External;  Recorded: 11/19/2014;  Comments: 10/11/13; ; 11/27/13 and reverted to afib after 11 days on mod dose sotalol.; ; 1/3/14  sinus on Amio.  10/31/14     AL CYSTO/URETERO W/LITHOTRIPSY &INDWELL STENT INSRT Right 7/16/2019    Procedure: CYSTOSCOPY RIGHT URETEROSCOPY, LASER  LITHOTRIPSY STENT INSERTION;  Surgeon: Kingston Kurtz MD;  Location: [facility];  Service: Urology     REMOVAL OF SPERM DUCT(S)      Description: Surgery Of Male Genitalia Vasectomy;  Recorded: 10/29/2013;     TONSILLECTOMY       VASECTOMY      Family History   Problem Relation Age of Onset     Cancer Mother         uterine     Diabetes Maternal Aunt      Urolithiasis No family hx of      Clotting Disorder No family hx of      Gout No family hx of      Heart Disease No family hx of      Anesthesia Reaction No family hx of     History   Smoking Status     Never   Smokeless Tobacco     Never     Social History    Substance and Sexual Activity      Alcohol use: No        Alcohol/week: 1.0 standard drink of alcohol       Medications  Allergies     Current Outpatient Medications   Medication Sig Dispense Refill     apixaban ANTICOAGULANT (ELIQUIS) 5 mg Tab tablet  [APIXABAN ANTICOAGULANT (ELIQUIS) 5 MG TAB TABLET] Take 1 tablet (5 mg total) by mouth every 12 (twelve) hours. 180 tablet 1     Continuous Blood Gluc Sensor (FREESTYLE OLEGARIO 2 SENSOR) MISC 1 each every 14 days Change every 14 days.       dulaglutide (TRULICITY) 0.75 MG/0.5ML pen Inject 0.75 mg Subcutaneous every 7 days       levothyroxine (SYNTHROID, LEVOTHROID) 88 MCG tablet [LEVOTHYROXINE (SYNTHROID, LEVOTHROID) 88 MCG TABLET] Take 88 mcg by mouth daily.       metFORMIN (GLUCOPHAGE) 500 MG tablet [METFORMIN (GLUCOPHAGE) 500 MG TABLET] Take 1,000 mg by mouth 2 (two) times a day with meals.       pregabalin (LYRICA) 75 MG capsule [PREGABALIN (LYRICA) 75 MG CAPSULE] Take 75 mg by mouth daily.        tamsulosin (FLOMAX) 0.4 MG capsule Take 0.4 mg by mouth daily       Vitamin D3 (VITAMIN D, CHOLECALCIFEROL,) 25 mcg (1000 units) tablet Take 1 tablet by mouth daily        Allergies   Allergen Reactions     Ezetimibe GI Disturbance     Simvastatin      Other reaction(s): myalgias, arthralgias      Medical, surgical, family, social history, and medications were all reviewed and updated as necessary.   Lab Results    Chemistry/lipid CBC Cardiac Enzymes/BNP/TSH/INR   Recent Labs   Lab Test 03/01/23  1319   CHOL 286*   HDL 43   *   TRIG 187*     Recent Labs   Lab Test 03/01/23  1319 01/04/22  0940 10/20/21  1605   * 143* 172*     Recent Labs   Lab Test 03/01/23  1319      POTASSIUM 4.9   CHLORIDE 100   CO2 26   *   BUN 15.3   CR 1.13   GFRESTIMATED 71   ASHLEIGH 9.9     Recent Labs   Lab Test 03/01/23  1319 09/07/22  1341 10/20/21  1605   CR 1.13 1.12 0.96     No results for input(s): A1C in the last 48334 hours.       No results for input(s): WBC, HGB, HCT, MCV, PLT in the last 40866 hours.  No results for input(s): HGB in the last 38353 hours. No results for input(s): TROPONINI in the last 25580 hours.  No results for input(s): BNP, NTBNPI, NTBNP in the last 69547 hours.  Recent Labs   Lab Test  09/07/22  1341   TSH 2.87     No results for input(s): INR in the last 94133 hours.       Total Time- 45 minutes spent on date of encounter doing chart review, history and exam, documentation and further activities as noted above.  This note has been dictated using voice recognition software. Any grammatical, typographical, or context distortions are unintentional and inherent to the software.

## 2023-04-19 NOTE — Clinical Note
Shavon-call Tyrone and let him know I want to start losartan 25 mg 1 tab every day for blood pressure control and suppose to be on med like this to protect kidneys with diabetes.  I sent this to mail order company.  I also need an ECHO to check his pump function before watchman as last one done was in 2016 Pool-put on list for watchman with [facility] only and no scheduling restrictions.  BMP done today.

## 2023-04-20 ENCOUNTER — TELEPHONE (OUTPATIENT)
Dept: CARDIOLOGY | Facility: CLINIC | Age: 68
End: 2023-04-20
Payer: COMMERCIAL

## 2023-04-20 DIAGNOSIS — I48.19 PERSISTENT ATRIAL FIBRILLATION (H): Primary | ICD-10-CM

## 2023-04-20 NOTE — TELEPHONE ENCOUNTER
BMP completed yesterday shows normal kidney function. Patient will require CT pulm vein to evaluate anatomy, per NP Skyler Hermosillo office visit yesterday. Phone call to patient to discuss need for CT, echo, and SDM appt. He verbalized understanding, will place orders and route to scheduling to arrange. No further questions at this time. [facility]    ----- Message -----  From: Mary Kate Hermosillo APRN CNP  Sent: 4/19/2023   4:11 PM CDT  To: Shavon Wang LPN; *    Shavon-call Tyrone and let him know I want to start losartan 25 mg 1 tab every day for blood pressure control and suppose to be on med like this to protect kidneys with diabetes.  I sent this to mail order company.  I also need an ECHO to check his pump function before watchman as last one done was in 2016  Pool-put on list for watchman with [facility] only and no scheduling restrictions.  BMP done today.

## 2023-04-20 NOTE — TELEPHONE ENCOUNTER
Message  Received: Yesterday  Mary Kate Hermosillo, Shavon Urbano CNP, LPN; P Hcc Left Atrial Appendage Closure Pool - [facility]  Shavon-call Tyrone and let him know I want to start losartan 25 mg 1 tab every day for blood pressure control and suppose to be on med like this to protect kidneys with diabetes.  I sent this to mail order company.  I also need an ECHO to check his pump function before watchman as last one done was in 2016   Pool-put on list for watchman with [facility] only and no scheduling restrictions.  BMP done today.     Pt was called with Skyler's note from visit yesterday  Pt understands and agrees to plan  Pt has my direct number for calls

## 2023-04-20 NOTE — TELEPHONE ENCOUNTER
VM left for writer, patient concerned as to whether or not CT and echocardiogram will be covered via his insurance plan.

## 2023-04-24 VITALS
HEART RATE: 65 BPM | HEIGHT: 73 IN | SYSTOLIC BLOOD PRESSURE: 134 MMHG | DIASTOLIC BLOOD PRESSURE: 76 MMHG | WEIGHT: 315 LBS | BODY MASS INDEX: 41.75 KG/M2

## 2023-04-24 RX ORDER — DULAGLUTIDE 1.5 MG/.5ML
1.5 INJECTION, SOLUTION SUBCUTANEOUS
COMMUNITY
Start: 2023-04-18 | End: 2023-07-25 | Stop reason: ALTCHOICE

## 2023-04-24 NOTE — TELEPHONE ENCOUNTER
Called back and was given cost of care number to call regarding cost of CT and Echocardiogram. [phone]. Patient had no further questions. -SC

## 2023-05-07 ENCOUNTER — APPOINTMENT (OUTPATIENT)
Dept: RADIOLOGY | Facility: CLINIC | Age: 68
End: 2023-05-07
Attending: EMERGENCY MEDICINE
Payer: COMMERCIAL

## 2023-05-07 ENCOUNTER — HOSPITAL ENCOUNTER (EMERGENCY)
Facility: CLINIC | Age: 68
Discharge: HOME | End: 2023-05-08
Attending: EMERGENCY MEDICINE | Admitting: EMERGENCY MEDICINE
Payer: COMMERCIAL

## 2023-05-07 DIAGNOSIS — R53.1 GENERALIZED WEAKNESS: ICD-10-CM

## 2023-05-07 DIAGNOSIS — U07.1 2019 NOVEL CORONAVIRUS DISEASE (COVID-19): ICD-10-CM

## 2023-05-07 DIAGNOSIS — E83.42 HYPOMAGNESEMIA: ICD-10-CM

## 2023-05-07 DIAGNOSIS — J96.01 ACUTE RESPIRATORY FAILURE WITH HYPOXIA (H): ICD-10-CM

## 2023-05-07 DIAGNOSIS — S20.311A ABRASION OF RIGHT CHEST WALL, INITIAL ENCOUNTER: ICD-10-CM

## 2023-05-07 DIAGNOSIS — S80.211A ABRASION OF RIGHT KNEE, INITIAL ENCOUNTER: ICD-10-CM

## 2023-05-07 LAB
ALBUMIN SERPL-MCNC: 3.7 G/DL (ref 3.5–5)
ALP SERPL-CCNC: 98 U/L (ref 45–120)
ALT SERPL W P-5'-P-CCNC: 31 U/L (ref 0–45)
ANION GAP SERPL CALCULATED.3IONS-SCNC: 13 MMOL/L (ref 5–18)
AST SERPL W P-5'-P-CCNC: 20 U/L (ref 0–40)
ATRIAL RATE - MUSE: 95 BPM
BASOPHILS # BLD AUTO: 0.1 10E3/UL (ref 0–0.2)
BASOPHILS NFR BLD AUTO: 1 %
BILIRUB DIRECT SERPL-MCNC: 0.3 MG/DL
BILIRUB SERPL-MCNC: 0.8 MG/DL (ref 0–1)
BNP SERPL-MCNC: 110 PG/ML (ref 0–62)
BUN SERPL-MCNC: 13 MG/DL (ref 8–22)
C REACTIVE PROTEIN LHE: 3.2 MG/DL (ref 0–?)
CALCIUM SERPL-MCNC: 9.1 MG/DL (ref 8.5–10.5)
CHLORIDE BLD-SCNC: 103 MMOL/L (ref 98–107)
CK SERPL-CCNC: 134 U/L (ref 30–190)
CO2 SERPL-SCNC: 25 MMOL/L (ref 22–31)
CREAT SERPL-MCNC: 1.22 MG/DL (ref 0.7–1.3)
DIASTOLIC BLOOD PRESSURE - MUSE: 57 MMHG
EOSINOPHIL # BLD AUTO: 0 10E3/UL (ref 0–0.7)
EOSINOPHIL NFR BLD AUTO: 0 %
ERYTHROCYTE [DISTWIDTH] IN BLOOD BY AUTOMATED COUNT: 12.8 % (ref 10–15)
FLUAV RNA SPEC QL NAA+PROBE: NEGATIVE
FLUBV RNA RESP QL NAA+PROBE: NEGATIVE
GFR SERPL CREATININE-BSD FRML MDRD: 65 ML/MIN/1.73M2
GLUCOSE BLD-MCNC: 199 MG/DL (ref 70–125)
HCT VFR BLD AUTO: 44.4 % (ref 40–53)
HGB BLD-MCNC: 14.7 G/DL (ref 13.3–17.7)
HOLD SPECIMEN: NORMAL
IMM GRANULOCYTES # BLD: 0.1 10E3/UL
IMM GRANULOCYTES NFR BLD: 1 %
INTERPRETATION ECG - MUSE: NORMAL
LYMPHOCYTES # BLD AUTO: 0.3 10E3/UL (ref 0.8–5.3)
LYMPHOCYTES NFR BLD AUTO: 4 %
MAGNESIUM SERPL-MCNC: 1.2 MG/DL (ref 1.8–2.6)
MCH RBC QN AUTO: 29.5 PG (ref 26.5–33)
MCHC RBC AUTO-ENTMCNC: 33.1 G/DL (ref 31.5–36.5)
MCV RBC AUTO: 89 FL (ref 78–100)
MONOCYTES # BLD AUTO: 0.7 10E3/UL (ref 0–1.3)
MONOCYTES NFR BLD AUTO: 7 %
NEUTROPHILS # BLD AUTO: 7.9 10E3/UL (ref 1.6–8.3)
NEUTROPHILS NFR BLD AUTO: 87 %
NRBC # BLD AUTO: 0 10E3/UL
NRBC BLD AUTO-RTO: 0 /100
P AXIS - MUSE: 81 DEGREES
PLATELET # BLD AUTO: 176 10E3/UL (ref 150–450)
POTASSIUM BLD-SCNC: 4.2 MMOL/L (ref 3.5–5)
PR INTERVAL - MUSE: 146 MS
PROCALCITONIN SERPL-MCNC: 0.04 NG/ML (ref 0–0.49)
PROT SERPL-MCNC: 7.2 G/DL (ref 6–8)
QRS DURATION - MUSE: 94 MS
QT - MUSE: 364 MS
QTC - MUSE: 457 MS
R AXIS - MUSE: 25 DEGREES
RBC # BLD AUTO: 4.98 10E6/UL (ref 4.4–5.9)
RSV RNA SPEC NAA+PROBE: NEGATIVE
SARS-COV-2 RNA RESP QL NAA+PROBE: POSITIVE
SODIUM SERPL-SCNC: 141 MMOL/L (ref 136–145)
SYSTOLIC BLOOD PRESSURE - MUSE: 117 MMHG
T AXIS - MUSE: 41 DEGREES
TSH SERPL DL<=0.005 MIU/L-ACNC: 2.03 UIU/ML (ref 0.3–5)
VENTRICULAR RATE- MUSE: 95 BPM
WBC # BLD AUTO: 9.1 10E3/UL (ref 4–11)

## 2023-05-07 PROCEDURE — 250N000013 HC RX MED GY IP 250 OP 250 PS 637: Performed by: EMERGENCY MEDICINE

## 2023-05-07 PROCEDURE — 96375 TX/PRO/DX INJ NEW DRUG ADDON: CPT

## 2023-05-07 PROCEDURE — 96368 THER/DIAG CONCURRENT INF: CPT

## 2023-05-07 PROCEDURE — 96366 THER/PROPH/DIAG IV INF ADDON: CPT

## 2023-05-07 PROCEDURE — 83880 ASSAY OF NATRIURETIC PEPTIDE: CPT | Performed by: EMERGENCY MEDICINE

## 2023-05-07 PROCEDURE — 96365 THER/PROPH/DIAG IV INF INIT: CPT

## 2023-05-07 PROCEDURE — C9803 HOPD COVID-19 SPEC COLLECT: HCPCS

## 2023-05-07 PROCEDURE — 93005 ELECTROCARDIOGRAM TRACING: CPT | Performed by: EMERGENCY MEDICINE

## 2023-05-07 PROCEDURE — 84443 ASSAY THYROID STIM HORMONE: CPT | Performed by: EMERGENCY MEDICINE

## 2023-05-07 PROCEDURE — 80053 COMPREHEN METABOLIC PANEL: CPT | Performed by: EMERGENCY MEDICINE

## 2023-05-07 PROCEDURE — 71045 X-RAY EXAM CHEST 1 VIEW: CPT

## 2023-05-07 PROCEDURE — 258N000003 HC RX IP 258 OP 636: Performed by: EMERGENCY MEDICINE

## 2023-05-07 PROCEDURE — 84145 PROCALCITONIN (PCT): CPT | Performed by: EMERGENCY MEDICINE

## 2023-05-07 PROCEDURE — 82550 ASSAY OF CK (CPK): CPT | Performed by: EMERGENCY MEDICINE

## 2023-05-07 PROCEDURE — 82248 BILIRUBIN DIRECT: CPT | Performed by: EMERGENCY MEDICINE

## 2023-05-07 PROCEDURE — 87637 SARSCOV2&INF A&B&RSV AMP PRB: CPT | Performed by: EMERGENCY MEDICINE

## 2023-05-07 PROCEDURE — 36415 COLL VENOUS BLD VENIPUNCTURE: CPT | Performed by: EMERGENCY MEDICINE

## 2023-05-07 PROCEDURE — 85025 COMPLETE CBC W/AUTO DIFF WBC: CPT | Performed by: EMERGENCY MEDICINE

## 2023-05-07 PROCEDURE — 99285 EMERGENCY DEPT VISIT HI MDM: CPT | Mod: 25,CS

## 2023-05-07 PROCEDURE — 86140 C-REACTIVE PROTEIN: CPT | Performed by: EMERGENCY MEDICINE

## 2023-05-07 PROCEDURE — 73562 X-RAY EXAM OF KNEE 3: CPT | Mod: RT

## 2023-05-07 PROCEDURE — 83735 ASSAY OF MAGNESIUM: CPT | Performed by: EMERGENCY MEDICINE

## 2023-05-07 PROCEDURE — 250N000011 HC RX IP 250 OP 636: Performed by: EMERGENCY MEDICINE

## 2023-05-07 RX ORDER — LIDOCAINE 4 G/G
1 PATCH TOPICAL ONCE
Status: DISCONTINUED | OUTPATIENT
Start: 2023-05-07 | End: 2023-05-08 | Stop reason: HOSPADM

## 2023-05-07 RX ORDER — DEXAMETHASONE SODIUM PHOSPHATE 10 MG/ML
6 INJECTION, SOLUTION INTRAMUSCULAR; INTRAVENOUS ONCE
Status: COMPLETED | OUTPATIENT
Start: 2023-05-07 | End: 2023-05-07

## 2023-05-07 RX ORDER — MAGNESIUM SULFATE HEPTAHYDRATE 40 MG/ML
2 INJECTION, SOLUTION INTRAVENOUS ONCE
Status: COMPLETED | OUTPATIENT
Start: 2023-05-07 | End: 2023-05-07

## 2023-05-07 RX ORDER — ACETAMINOPHEN 325 MG/1
975 TABLET ORAL ONCE
Status: COMPLETED | OUTPATIENT
Start: 2023-05-07 | End: 2023-05-07

## 2023-05-07 RX ORDER — ONDANSETRON 2 MG/ML
4 INJECTION INTRAMUSCULAR; INTRAVENOUS ONCE
Status: COMPLETED | OUTPATIENT
Start: 2023-05-07 | End: 2023-05-07

## 2023-05-07 RX ORDER — METFORMIN HCL 500 MG
1000 TABLET, EXTENDED RELEASE 24 HR ORAL 2 TIMES DAILY WITH MEALS
COMMUNITY

## 2023-05-07 RX ADMIN — MAGNESIUM SULFATE HEPTAHYDRATE 2 G: 40 INJECTION, SOLUTION INTRAVENOUS at 21:24

## 2023-05-07 RX ADMIN — LIDOCAINE 1 PATCH: 246 PATCH TOPICAL at 19:57

## 2023-05-07 RX ADMIN — DEXAMETHASONE SODIUM PHOSPHATE 6 MG: 10 INJECTION, SOLUTION INTRAMUSCULAR; INTRAVENOUS at 19:53

## 2023-05-07 RX ADMIN — ACETAMINOPHEN 975 MG: 325 TABLET ORAL at 19:54

## 2023-05-07 RX ADMIN — REMDESIVIR 200 MG: 100 INJECTION, POWDER, LYOPHILIZED, FOR SOLUTION INTRAVENOUS at 22:39

## 2023-05-07 RX ADMIN — ONDANSETRON 4 MG: 2 INJECTION INTRAMUSCULAR; INTRAVENOUS at 21:23

## 2023-05-07 RX ADMIN — SODIUM CHLORIDE 50 ML: 9 INJECTION, SOLUTION INTRAVENOUS at 23:51

## 2023-05-07 ASSESSMENT — ENCOUNTER SYMPTOMS
COUGH: 1
FATIGUE: 1
SHORTNESS OF BREATH: 1
MYALGIAS: 1

## 2023-05-07 ASSESSMENT — ACTIVITIES OF DAILY LIVING (ADL)
ADLS_ACUITY_SCORE: 35
ADLS_ACUITY_SCORE: 35

## 2023-05-08 ENCOUNTER — HOSPITAL ENCOUNTER (INPATIENT)
Facility: CLINIC | Age: 68
LOS: 1 days | Discharge: HOME | DRG: 177 | End: 2023-05-08
Attending: INTERNAL MEDICINE | Admitting: STUDENT IN AN ORGANIZED HEALTH CARE EDUCATION/TRAINING PROGRAM
Payer: COMMERCIAL

## 2023-05-08 VITALS
HEART RATE: 70 BPM | DIASTOLIC BLOOD PRESSURE: 56 MMHG | TEMPERATURE: 96.6 F | SYSTOLIC BLOOD PRESSURE: 148 MMHG | BODY MASS INDEX: 40.43 KG/M2 | RESPIRATION RATE: 20 BRPM | WEIGHT: 315 LBS | HEIGHT: 74 IN | OXYGEN SATURATION: 96 %

## 2023-05-08 VITALS
OXYGEN SATURATION: 98 % | DIASTOLIC BLOOD PRESSURE: 71 MMHG | BODY MASS INDEX: 40.43 KG/M2 | SYSTOLIC BLOOD PRESSURE: 132 MMHG | WEIGHT: 315 LBS | RESPIRATION RATE: 28 BRPM | HEIGHT: 74 IN | HEART RATE: 64 BPM | TEMPERATURE: 98.6 F

## 2023-05-08 DIAGNOSIS — U07.1 2019 NOVEL CORONAVIRUS DISEASE (COVID-19): Primary | ICD-10-CM

## 2023-05-08 LAB
CK SERPL-CCNC: 434 U/L (ref 39–308)
CRP SERPL-MCNC: 47.95 MG/L
D DIMER PPP FEU-MCNC: 1.09 UG/ML FEU (ref 0–0.5)
HBA1C MFR BLD: 6.5 %
HOLD SPECIMEN: NORMAL
INR PPP: 1.06 (ref 0.85–1.15)
MAGNESIUM SERPL-MCNC: 2 MG/DL (ref 1.7–2.3)

## 2023-05-08 PROCEDURE — 36415 COLL VENOUS BLD VENIPUNCTURE: CPT | Performed by: STUDENT IN AN ORGANIZED HEALTH CARE EDUCATION/TRAINING PROGRAM

## 2023-05-08 PROCEDURE — 5A09357 ASSISTANCE WITH RESPIRATORY VENTILATION, LESS THAN 24 CONSECUTIVE HOURS, CONTINUOUS POSITIVE AIRWAY PRESSURE: ICD-10-PCS | Performed by: STUDENT IN AN ORGANIZED HEALTH CARE EDUCATION/TRAINING PROGRAM

## 2023-05-08 PROCEDURE — 120N000002 HC R&B MED SURG/OB UMMC

## 2023-05-08 PROCEDURE — 83036 HEMOGLOBIN GLYCOSYLATED A1C: CPT | Performed by: STUDENT IN AN ORGANIZED HEALTH CARE EDUCATION/TRAINING PROGRAM

## 2023-05-08 PROCEDURE — 99236 HOSP IP/OBS SAME DATE HI 85: CPT | Performed by: STUDENT IN AN ORGANIZED HEALTH CARE EDUCATION/TRAINING PROGRAM

## 2023-05-08 PROCEDURE — 82550 ASSAY OF CK (CPK): CPT | Performed by: STUDENT IN AN ORGANIZED HEALTH CARE EDUCATION/TRAINING PROGRAM

## 2023-05-08 PROCEDURE — 99207 PR APP CREDIT; MD BILLING SHARED VISIT: CPT | Performed by: INTERNAL MEDICINE

## 2023-05-08 PROCEDURE — 85610 PROTHROMBIN TIME: CPT | Performed by: STUDENT IN AN ORGANIZED HEALTH CARE EDUCATION/TRAINING PROGRAM

## 2023-05-08 PROCEDURE — 83735 ASSAY OF MAGNESIUM: CPT | Performed by: STUDENT IN AN ORGANIZED HEALTH CARE EDUCATION/TRAINING PROGRAM

## 2023-05-08 PROCEDURE — XW033E5 INTRODUCTION OF REMDESIVIR ANTI-INFECTIVE INTO PERIPHERAL VEIN, PERCUTANEOUS APPROACH, NEW TECHNOLOGY GROUP 5: ICD-10-PCS | Performed by: STUDENT IN AN ORGANIZED HEALTH CARE EDUCATION/TRAINING PROGRAM

## 2023-05-08 PROCEDURE — 250N000013 HC RX MED GY IP 250 OP 250 PS 637: Performed by: STUDENT IN AN ORGANIZED HEALTH CARE EDUCATION/TRAINING PROGRAM

## 2023-05-08 PROCEDURE — 250N000012 HC RX MED GY IP 250 OP 636 PS 637: Performed by: STUDENT IN AN ORGANIZED HEALTH CARE EDUCATION/TRAINING PROGRAM

## 2023-05-08 PROCEDURE — 86140 C-REACTIVE PROTEIN: CPT | Performed by: STUDENT IN AN ORGANIZED HEALTH CARE EDUCATION/TRAINING PROGRAM

## 2023-05-08 PROCEDURE — 85379 FIBRIN DEGRADATION QUANT: CPT | Performed by: STUDENT IN AN ORGANIZED HEALTH CARE EDUCATION/TRAINING PROGRAM

## 2023-05-08 RX ORDER — ACETAMINOPHEN 325 MG/1
650 TABLET ORAL EVERY 6 HOURS PRN
Status: DISCONTINUED | OUTPATIENT
Start: 2023-05-08 | End: 2023-05-08 | Stop reason: HOSPADM

## 2023-05-08 RX ORDER — NICOTINE POLACRILEX 4 MG
15-30 LOZENGE BUCCAL
Status: DISCONTINUED | OUTPATIENT
Start: 2023-05-08 | End: 2023-05-08 | Stop reason: HOSPADM

## 2023-05-08 RX ORDER — LEVOTHYROXINE SODIUM 88 UG/1
88 TABLET ORAL AT BEDTIME
Status: DISCONTINUED | OUTPATIENT
Start: 2023-05-08 | End: 2023-05-08 | Stop reason: HOSPADM

## 2023-05-08 RX ORDER — ACETAMINOPHEN 650 MG/1
650 SUPPOSITORY RECTAL EVERY 6 HOURS PRN
Status: DISCONTINUED | OUTPATIENT
Start: 2023-05-08 | End: 2023-05-08 | Stop reason: HOSPADM

## 2023-05-08 RX ORDER — ONDANSETRON 2 MG/ML
4 INJECTION INTRAMUSCULAR; INTRAVENOUS EVERY 6 HOURS PRN
Status: DISCONTINUED | OUTPATIENT
Start: 2023-05-08 | End: 2023-05-08 | Stop reason: HOSPADM

## 2023-05-08 RX ORDER — DEXAMETHASONE 6 MG/1
6 TABLET ORAL DAILY
Qty: 5 TABLET | Refills: 0 | Status: SHIPPED | OUTPATIENT
Start: 2023-05-09 | End: 2023-07-25

## 2023-05-08 RX ORDER — DEXTROSE MONOHYDRATE 25 G/50ML
25-50 INJECTION, SOLUTION INTRAVENOUS
Status: DISCONTINUED | OUTPATIENT
Start: 2023-05-08 | End: 2023-05-08 | Stop reason: HOSPADM

## 2023-05-08 RX ORDER — PREGABALIN 75 MG/1
75 CAPSULE ORAL EVERY MORNING
Status: DISCONTINUED | OUTPATIENT
Start: 2023-05-08 | End: 2023-05-08 | Stop reason: HOSPADM

## 2023-05-08 RX ORDER — LOSARTAN POTASSIUM 25 MG/1
25 TABLET ORAL DAILY
Status: DISCONTINUED | OUTPATIENT
Start: 2023-05-08 | End: 2023-05-08 | Stop reason: HOSPADM

## 2023-05-08 RX ORDER — LIDOCAINE 40 MG/G
CREAM TOPICAL
Status: DISCONTINUED | OUTPATIENT
Start: 2023-05-08 | End: 2023-05-08 | Stop reason: HOSPADM

## 2023-05-08 RX ORDER — ONDANSETRON 4 MG/1
4 TABLET, ORALLY DISINTEGRATING ORAL EVERY 6 HOURS PRN
Status: DISCONTINUED | OUTPATIENT
Start: 2023-05-08 | End: 2023-05-08 | Stop reason: HOSPADM

## 2023-05-08 RX ORDER — TAMSULOSIN HYDROCHLORIDE 0.4 MG/1
0.4 CAPSULE ORAL AT BEDTIME
Status: DISCONTINUED | OUTPATIENT
Start: 2023-05-08 | End: 2023-05-08 | Stop reason: HOSPADM

## 2023-05-08 RX ADMIN — ACETAMINOPHEN 650 MG: 325 TABLET ORAL at 12:37

## 2023-05-08 RX ADMIN — ACETAMINOPHEN 650 MG: 325 TABLET ORAL at 04:34

## 2023-05-08 RX ADMIN — DEXAMETHASONE 6 MG: 2 TABLET ORAL at 12:19

## 2023-05-08 RX ADMIN — APIXABAN 5 MG: 5 TABLET, FILM COATED ORAL at 08:04

## 2023-05-08 RX ADMIN — PREGABALIN 75 MG: 75 CAPSULE ORAL at 08:04

## 2023-05-08 RX ADMIN — LOSARTAN POTASSIUM 25 MG: 25 TABLET, FILM COATED ORAL at 08:04

## 2023-05-08 ASSESSMENT — ACTIVITIES OF DAILY LIVING (ADL)
WALKING_OR_CLIMBING_STAIRS_DIFFICULTY: NO
ADLS_ACUITY_SCORE: 35
NUMBER_OF_TIMES_PATIENT_HAS_FALLEN_WITHIN_LAST_SIX_MONTHS: 1
CONCENTRATING,_REMEMBERING_OR_MAKING_DECISIONS_DIFFICULTY: NO
DRESSING/BATHING_DIFFICULTY: NO
ADLS_ACUITY_SCORE: 22
ADLS_ACUITY_SCORE: 22
WEAR_GLASSES_OR_BLIND: NO
CHANGE_IN_FUNCTIONAL_STATUS_SINCE_ONSET_OF_CURRENT_ILLNESS/INJURY: NO
ADLS_ACUITY_SCORE: 22
DOING_ERRANDS_INDEPENDENTLY_DIFFICULTY: NO
FALL_HISTORY_WITHIN_LAST_SIX_MONTHS: YES
DIFFICULTY_EATING/SWALLOWING: NO
ADLS_ACUITY_SCORE: 35
ADLS_ACUITY_SCORE: 35
TOILETING_ISSUES: NO
ADLS_ACUITY_SCORE: 22
ADLS_ACUITY_SCORE: 22

## 2023-05-08 ASSESSMENT — COLUMBIA-SUICIDE SEVERITY RATING SCALE - C-SSRS
1. IN THE PAST MONTH, HAVE YOU WISHED YOU WERE DEAD OR WISHED YOU COULD GO TO SLEEP AND NOT WAKE UP?: NO
3. HAVE YOU BEEN THINKING ABOUT HOW YOU MIGHT KILL YOURSELF?: NO
4. HAVE YOU HAD THESE THOUGHTS AND HAD SOME INTENTION OF ACTING ON THEM?: NO
6. HAVE YOU EVER DONE ANYTHING, STARTED TO DO ANYTHING, OR PREPARED TO DO ANYTHING TO END YOUR LIFE?: NO
5. HAVE YOU STARTED TO WORK OUT OR WORKED OUT THE DETAILS OF HOW TO KILL YOURSELF? DO YOU INTEND TO CARRY OUT THIS PLAN?: NO
2. HAVE YOU ACTUALLY HAD ANY THOUGHTS OF KILLING YOURSELF IN THE PAST MONTH?: NO

## 2023-05-08 NOTE — PROGRESS NOTES
Brief Care Management Note:    Opted to deliver IMM via phone d/t covid precautions.    11:57am - Called pt, discussed IMM form.  Pt expressed understanding, had no questions, and declined to receive a copy.  Subsequently signed on behalf of pt and faxed to HIM.    There are no further discernible care coordination needs at this time.  Please reach out to RNCC/SW if needs arise prior to discharge.        RNCC available as needed.    JADON Pop  [facility]  [address]/S  Pager: [phone]  Phone: [phone]

## 2023-05-08 NOTE — PLAN OF CARE
Pt AOx4, continent of B&B, independent in room. Pt on covid isolation, able to be on room air >90% O2. Called respiratory per pt request. Pt wants increased pressure on CPAP machine. Per RT, since home CPAP, cannot change settings.    Pt wanting to discharge. MD put in discharge orders. Pt to  discharge medications at outside pharmacy. Discharge instructions given and questions answered. PIVs removed.    Pt discharged at 1626 to home via car with family.

## 2023-05-08 NOTE — PHARMACY-ADMISSION MEDICATION HISTORY
Please see Admission Medication History completed on 5/7/23 under previous encounter at [facility] for information regarding prior to admission medications.    Raji Domínguez, PharmD

## 2023-05-08 NOTE — ED TRIAGE NOTES
Woke this morning with generalized body aches and fever.  Tested for Covid and was positive.  This evening was weak and sweating.  Fall onto his knees at home and was unable to get up so EMS was called.     Triage Assessment     Row Name 05/07/23 [id number]       Triage Assessment (Adult)    Airway WDL WDL       Respiratory WDL    Respiratory WDL WDL       Skin Circulation/Temperature WDL    Skin Circulation/Temperature WDL WDL       Cardiac WDL    Cardiac WDL WDL       Peripheral/Neurovascular WDL    Peripheral Neurovascular WDL WDL       Cognitive/Neuro/Behavioral WDL    Cognitive/Neuro/Behavioral WDL WDL

## 2023-05-08 NOTE — ED PROVIDER NOTES
"EMERGENCY DEPARTMENT ENCOUNTER      NAME: Khari Jamison  AGE: 67 year old male  YOB: 1955  MRN: [id number]  EVALUATION DATE & TIME: 5/7/2023  7:10 PM    PCP: Shi Sneed    ED PROVIDER: Raji Mckeon M.D.      Chief Complaint   Patient presents with     Covid     Generalized Weakness         IMPRESSION  1. Acute respiratory failure with hypoxia (H)    2. 2019 novel coronavirus disease (COVID-19)    3. Hypomagnesemia    4. Generalized weakness    5. Abrasion of right chest wall, initial encounter    6. Abrasion of right knee, initial encounter        PLAN  - transfer to [facility] for further care    ED COURSE & MEDICAL DECISION MAKING    ED Course as of 05/07/23 2249   Sun May 07, 2023   1933 67yoF with history of a-fib/flutter (takes Eliquis), obesity, HTN, HLD, no history of smoking or lung disease presenting for evaluation of weakness & shortness of breath. Reports 1 days of fatigue, dry cough, body aches (\"even my hair hurts\"). Reports having positive home COVID test today. No nausea, vomiting, diarrhea. Was in the bathroom today and was took weak to get himself up; slid to the floor on both knees. Resulting right knee abrasion and right chest wall abrasion (was leaning on towel bar, keeping himself upright). Denies any head injury or LOC. Thus called EMS. Satting 80%s for them so placed on 2L NC. Here now, patient denies any shortness of breath, chest pain, pleuritic symptoms.    HR 100s on presentation with BP 110s/50s, afebrile, RR 20, satting 96% on 2L NC. Calm during exam with no wheezing or crackles to lungs, normal phonation, no stridor, mild tender linear abrasion to right  inferior anterior chest wall with no crepitus, mild tender abrasion to right anterior knee just inferior to patella, otherwise atraumatic exam, no peripheral edema or calf tenderness.    Doubt PE given Eliquis. COVID+ with hypoxia certainly warrants admission. Will give Decadron while obtaining EKG, " X-rays, viral swab, blood tests. Patient comfortable with this plan; no further questions at this time.   2219 COVID+ confirmed. Doubt secondary bacterial pneumonia with negative procal. CXR clear. BNP just 110; doubt CHF. Doubt ACS with no ischemic changes on EKG and troponin within normal limits. Labs with mag 1.2 (replaced) with no STACEY or other electrolyte derangement.    Given Decadon & remdesivir for hypoxic COVID. Stable on 3L NC with normal work of breathing; stable for floor. No beds at  and patient agreeable to transfer. Consulted hospitalist at [facility] for admission; they agreed. Patient understood and agreed with the plan; no further questions at the time of transfer.       --------------------------------------------------------------------------------   --------------------------------------------------------------------------------     7:41 PM I met with the patient for the initial interview and physical examination. Discussed plan for treatment and workup in the ED.  9:15 PM I rechecked the patient. Patient is currently nauseous. Patient is in agreement to be transferred to the [facility].   9:47 PM I spoke with [facility] hospitalist, Dr. Morataya, regarding patient's care. Hospitalist accepts patient for transfer.        This patient involved a high degree of complexity in medical decision making, as significant risks were present and assessed. Recent encounters & results in medical record reviewed by me.    All workup (i.e. any EKG/labs/imaging as per charting below) reviewed and independently interpreted by me. See respective sections for details.    Broad differential considered for this patient presenting, including but not limited to:  COVID, bacterial pneumonia, sepsis, PE, CHF, ACS, myocarditis, pneumothorax, pleural effusion, anemia, abrasion, contusion, fracture, anemia, STACEY, electrolyte derangement    I wore the following PPE during this patient encounter:  N95 mask, face shield w/ eye  "protection, gloves    See additional MDM below if interested.      MEDICATIONS GIVEN IN THE EMERGENCY DEPARTMENT  Medications   Lidocaine (LIDOCARE) 4 % Patch 1 patch (1 patch Transdermal $Patch/Med Applied 5/7/23 1957)   remdesivir 200 mg in sodium chloride 0.9 % 250 mL intermittent infusion (200 mg Intravenous $New Bag 5/7/23 2239)     Followed by   0.9% sodium chloride BOLUS (has no administration in time range)   remdesivir 100 mg in sodium chloride 0.9 % 250 mL intermittent infusion (has no administration in time range)     And   0.9% sodium chloride BOLUS (has no administration in time range)   dexamethasone PF (DECADRON) injection 6 mg (6 mg Intravenous $Given 5/7/23 1953)   acetaminophen (TYLENOL) tablet 975 mg (975 mg Oral $Given 5/7/23 1954)   magnesium sulfate 2 g in 50 mL sterile water intermittent infusion (2 g Intravenous $New Bag 5/7/23 2124)   ondansetron (ZOFRAN) injection 4 mg (4 mg Intravenous $Given 5/7/23 2123)             =================================================================      HPI  Patient information was obtained from: Patient    Use of : N/A       Khari Jamison is a history of a-fib/flutter (takes Eliquis), obesity, HTN, HLD, no history of smoking or lung disease presenting for evaluation of weakness & shortness of breath. Reports 1 days of fatigue, dry cough, body aches (\"even my hair hurts\"). Reports having positive home COVID test today. No nausea, vomiting, diarrhea. Was in the bathroom today and was took weak to get himself up; slid to the floor on both knees. Resulting right knee abrasion and right chest wall abrasion (was leaning on towel bar, keeping himself upright). Denies any head injury or LOC. Thus called EMS. Satting 80%s for them so placed on 2L NC. Here now, patient denies any shortness of breath, chest pain, pleuritic symptoms.    REVIEW OF SYSTEMS   Review of Systems   Constitutional: Positive for fatigue.   Respiratory: Positive for cough and " shortness of breath.    Cardiovascular: Negative for chest pain.   Musculoskeletal: Positive for myalgias.        Positive for right knee abrasion. Positive for right chest abrasion.     All other systems reviewed and are negative except as noted above in HPI.      --------------- MEDICAL HISTORY ---------------  PAST MEDICAL HISTORY:  Reviewed independently by me.  Past Medical History:   Diagnosis Date     Arrhythmia     a-fib     Atrial fibrillation (H)      Back pain      Back pain      Diabetes mellitus (H)      Hyperlipemia      Kidney stone      Obesity      GASTON (obstructive sleep apnea)     CPAP     PONV (postoperative nausea and vomiting)     postoperatively with oral pain meds     Status post ablation of atrial fibrillation 1/27/2016    PVI Sept 2014 (cryo-PVI + roof line + SAMUEL line)     Patient Active Problem List   Diagnosis     Morbid obesity with BMI of 45.0-49.9, adult (H)     Type 2 diabetes mellitus without complication, without long-term current use of insulin (H)     Hyperlipidemia LDL goal <100     GASTON on CPAP     Persistent Atrial Fibrillation     Atypical atrial flutter (H)     Calculus of kidney     Hyperoxaluria     Status post ablation of atrial fibrillation     Essential hypertension       PAST SURGICAL HISTORY:  Reviewed independently by me.  Past Surgical History:   Procedure Laterality Date     CARDIAC ELECTROPHYSIOLOGY MAPPING AND ABLATION  1/27/16    pvi     CARDIOVERSION  10/31/14     CARDIOVERSION  05/16/2016     COLONOSCOPY N/A 11/18/2015    Procedure: COLONOSCOPY WITH POLYPECTOMY USING BIOPSY FORCEP;  Surgeon: Coco Vallecillo MD;  Location: [facility];  Service:      COMBINED CYSTOSCOPY, INSERT STENT URETER(S) Bilateral 8/20/2015    Procedure: CYSTOSCOPY, BILATERAL STENT REMOVAL, LEFT URETEROSCOPY, STONE EXTRACTION, LEFT STENT INSERTION;  Surgeon: Ansley Jeffery MD;  Location: [facility] OR;  Service:      CYSTOSCOPY W/ LASER LITHOTRIPSY  aug 2015     OTHER SURGICAL  HISTORY  9/4/14    pulmonary vein isolation     OTHER SURGICAL HISTORY      eyelid lift     VT ABLATE HEART DYSRHYTHM FOCUS      Description: Catheter Ablation Atrial Fibrillation;  Recorded: 10/08/2014;  Comments: 9/4/14 PVI Cyro to 4 PVs; Roof and SAMUEL lines done.     VT CARDIOVERSION ELECTIVE ARRHYTHMIA EXTERNAL  09/11/2015    Description: Elective Cardioversion External;  Recorded: 03/06/2014;  Comments: 10/11/13; ; 11/27/13 and reverted to afib after 11 days on mod dose sotalol.; ; 1/3/14  sinus on Amio     VT CARDIOVERSION ELECTIVE ARRHYTHMIA EXTERNAL      Description: Elective Cardioversion External;  Recorded: 11/19/2014;  Comments: 10/11/13; ; 11/27/13 and reverted to afib after 11 days on mod dose sotalol.; ; 1/3/14  sinus on Amio.  10/31/14     VT CYSTO/URETERO W/LITHOTRIPSY &INDWELL STENT INSRT Right 7/16/2019    Procedure: CYSTOSCOPY RIGHT URETEROSCOPY, LASER  LITHOTRIPSY STENT INSERTION;  Surgeon: Kingston Kurtz MD;  Location: [facility];  Service: Urology     REMOVAL OF SPERM DUCT(S)      Description: Surgery Of Male Genitalia Vasectomy;  Recorded: 10/29/2013;     TONSILLECTOMY       VASECTOMY         CURRENT MEDICATIONS:    Reviewed independently by me.    Current Facility-Administered Medications:      remdesivir 200 mg in sodium chloride 0.9 % 250 mL intermittent infusion, 200 mg, Intravenous, Once, Last Rate: 500 mL/hr at 05/07/23 2239, 200 mg at 05/07/23 2239 **FOLLOWED BY** 0.9% sodium chloride BOLUS, 50 mL, Intravenous, Once, Raji Mckeon MD     [START ON 5/8/2023] remdesivir 100 mg in sodium chloride 0.9 % 250 mL intermittent infusion, 100 mg, Intravenous, Q24H **AND** [START ON 5/8/2023] 0.9% sodium chloride BOLUS, 50 mL, Intravenous, Q24H, Raji Mckeon MD     Lidocaine (LIDOCARE) 4 % Patch 1 patch, 1 patch, Transdermal, Once, Raji Mckeon MD, 1 patch at 05/07/23 1957    Current Outpatient Medications:      apixaban ANTICOAGULANT (ELIQUIS) 5 mg Tab tablet,  [APIXABAN ANTICOAGULANT (ELIQUIS) 5 MG TAB TABLET] Take 1 tablet (5 mg total) by mouth every 12 (twelve) hours., Disp: 180 tablet, Rfl: 1     levothyroxine (SYNTHROID, LEVOTHROID) 88 MCG tablet, Take 88 mcg by mouth At Bedtime, Disp: , Rfl:      losartan (COZAAR) 25 MG tablet, Take 1 tablet (25 mg) by mouth daily, Disp: 90 tablet, Rfl: 3     metFORMIN (GLUCOPHAGE XR) 500 MG 24 hr tablet, Take 1,000 mg by mouth 2 times daily (with meals), Disp: , Rfl:      pregabalin (LYRICA) 75 MG capsule, Take 75 mg by mouth every morning, Disp: , Rfl:      tamsulosin (FLOMAX) 0.4 MG capsule, Take 0.4 mg by mouth At Bedtime, Disp: , Rfl:      TRULICITY 1.5 MG/0.5ML pen, Inject 1.5 mg Subcutaneous every 7 days Every Sundays, Disp: , Rfl:      Vitamin D3 (VITAMIN D, CHOLECALCIFEROL,) 25 mcg (1000 units) tablet, Take 1 tablet by mouth every morning, Disp: , Rfl:      Continuous Blood Gluc Sensor (FREESTYLE OLEGARIO 2 SENSOR) MISC, 1 each every 14 days Change every 14 days., Disp: , Rfl:     ALLERGIES:  Reviewed independently by me.  Allergies   Allergen Reactions     Ezetimibe GI Disturbance     Simvastatin      Other reaction(s): myalgias, arthralgias       FAMILY HISTORY:  Reviewed independently by me.  Family History   Problem Relation Age of Onset     Cancer Mother         uterine     Diabetes Maternal Aunt      Urolithiasis No family hx of      Clotting Disorder No family hx of      Gout No family hx of      Heart Disease No family hx of      Anesthesia Reaction No family hx of        SOCIAL HISTORY:   Reviewed independently by me.  Social History     Socioeconomic History     Marital status:    Tobacco Use     Smoking status: Never     Smokeless tobacco: Never   Substance and Sexual Activity     Alcohol use: No     Alcohol/week: 1.0 standard drink of alcohol     Drug use: No       --------------- PHYSICAL EXAM ---------------  Nursing notes and vitals independently reviewed by me.  VITALS:  Vitals:    05/07/23 1924 05/07/23  1930 05/07/23 2030 05/07/23 2100   BP: 117/57 117/57 (!) 150/72 134/66   BP Location: Right arm      Patient Position: Semi-Tsai's      Cuff Size: Adult Regular      Pulse: 81 86 87 95   Resp: 20  15 20   Temp: 98.6  F (37  C)      TempSrc: Oral      SpO2: 95% 97% 99% 98%   Weight:       Height:           PHYSICAL EXAM:    General:  alert, interactive, no distress  Eyes:  conjunctivae clear, conjugate gaze  HENT:  atraumatic, nose with no rhinorrhea, oropharynx clear  Neck:  no meningismus  Cardiovascular:  HR 80s during exam, regular rhythm, no murmurs, brisk cap refill  Chest:  Linear abrasion to right inferior anterior chest wall with mild tenderness, no crepitus, no laceration  Pulmonary:  no stridor, normal phonation, normal work of breathing, clear lungs bilaterally  Abdomen:  soft, nondistended, nontender  :  no CVA tenderness  Back:  no midline spinal tenderness  Musculoskeletal:    - no pretibial edema, no calf tenderness  BUE:  atraumatic with painless active ROM intact & distal CMS intact  LLE:  atraumatic with painless active ROM intact, overlying skin intact, distal CMS intact  RLE: mild tender abrasion to right anterior knee just inferior to patella with no effusion or laxity, no hip/ankle tenderness or pain with ROM, distal CMS intact  Skin:  warm, dry, no rash  Neuro:  awake, alert, answers questions appropriately, follows commands, moves all limbs  Psych:  calm, normal affect      --------------- RESULTS ---------------  EKG:    Reviewed and independently interpreted by me.  - NSR at 95bpm with PACs, no ST or T wave changes, normal intervals  - increased rate from 48bpm prior on 6/8/16 with otherwise no changes  My read.    LAB:  Reviewed and independently interpreted by me.  Results for orders placed or performed during the hospital encounter of 05/07/23   XR Chest Port 1 View    Impression    IMPRESSION: Negative chest.   XR Knee Port Right 3 Views    Impression    IMPRESSION: Mild  patellofemoral compartment degenerative change. No fracture or joint effusion.   Symptomatic Influenza A/B, RSV, & SARS-CoV2 PCR (COVID-19) Nasopharyngeal    Specimen: Nasopharyngeal; Swab   Result Value Ref Range    Influenza A PCR Negative Negative    Influenza B PCR Negative Negative    RSV PCR Negative Negative    SARS CoV2 PCR Positive (A) Negative   Basic metabolic panel   Result Value Ref Range    Sodium 141 136 - 145 mmol/L    Potassium 4.2 3.5 - 5.0 mmol/L    Chloride 103 98 - 107 mmol/L    Carbon Dioxide (CO2) 25 22 - 31 mmol/L    Anion Gap 13 5 - 18 mmol/L    Urea Nitrogen 13 8 - 22 mg/dL    Creatinine 1.22 0.70 - 1.30 mg/dL    Calcium 9.1 8.5 - 10.5 mg/dL    Glucose 199 (H) 70 - 125 mg/dL    GFR Estimate 65 >60 mL/min/1.73m2   Hepatic function panel   Result Value Ref Range    Bilirubin Total 0.8 0.0 - 1.0 mg/dL    Bilirubin Direct 0.3 <=0.5 mg/dL    Protein Total 7.2 6.0 - 8.0 g/dL    Albumin 3.7 3.5 - 5.0 g/dL    Alkaline Phosphatase 98 45 - 120 U/L    AST 20 0 - 40 U/L    ALT 31 0 - 45 U/L   Result Value Ref Range    Magnesium 1.2 (L) 1.8 - 2.6 mg/dL   TSH with free T4 reflex   Result Value Ref Range    TSH 2.03 0.30 - 5.00 uIU/mL   CRP inflammation   Result Value Ref Range    CRP 3.2 (H) 0.0 - <0.8 mg/dL   Result Value Ref Range    Procalcitonin 0.04 0.00 - 0.49 ng/mL   B-Type Natriuretic Peptide ([facility] Only)   Result Value Ref Range     (H) 0 - 62 pg/mL   Result Value Ref Range     30 - 190 U/L   CBC with platelets and differential   Result Value Ref Range    WBC Count 9.1 4.0 - 11.0 10e3/uL    RBC Count 4.98 4.40 - 5.90 10e6/uL    Hemoglobin 14.7 13.3 - 17.7 g/dL    Hematocrit 44.4 40.0 - 53.0 %    MCV 89 78 - 100 fL    MCH 29.5 26.5 - 33.0 pg    MCHC 33.1 31.5 - 36.5 g/dL    RDW 12.8 10.0 - 15.0 %    Platelet Count 176 150 - 450 10e3/uL    % Neutrophils 87 %    % Lymphocytes 4 %    % Monocytes 7 %    % Eosinophils 0 %    % Basophils 1 %    % Immature Granulocytes 1 %    NRBCs per  100 WBC 0 <1 /100    Absolute Neutrophils 7.9 1.6 - 8.3 10e3/uL    Absolute Lymphocytes 0.3 (L) 0.8 - 5.3 10e3/uL    Absolute Monocytes 0.7 0.0 - 1.3 10e3/uL    Absolute Eosinophils 0.0 0.0 - 0.7 10e3/uL    Absolute Basophils 0.1 0.0 - 0.2 10e3/uL    Absolute Immature Granulocytes 0.1 <=0.4 10e3/uL    Absolute NRBCs 0.0 10e3/uL   Extra Blue Top Tube   Result Value Ref Range    Hold Specimen JI    ECG 12-LEAD WITH MUSE ([facility])   Result Value Ref Range    Systolic Blood Pressure 117 mmHg    Diastolic Blood Pressure 57 mmHg    Ventricular Rate 95 BPM    Atrial Rate 95 BPM    OH Interval 146 ms    QRS Duration 94 ms     ms    QTc 457 ms    P Axis 81 degrees    R AXIS 25 degrees    T Axis 41 degrees    Interpretation ECG       Sinus rhythm with Premature atrial complexes with Aberrant conduction  Otherwise normal ECG  When compared with ECG of 08-JUN-2016 10:05,  Aberrant conduction is now Present  Vent. rate has increased BY  47 BPM  Confirmed by SEE ED PROVIDER NOTE FOR, ECG INTERPRETATION ([id number]),  LISET DUNN ([id number]) on 5/7/2023 9:21:03 PM         RADIOLOGY:  Reviewed and independently interpreted by me. Please see official radiology report.  Recent Results (from the past 24 hour(s))   XR Chest Port 1 View    Narrative    EXAM: XR CHEST PORT 1 VIEW  LOCATION: [facility]  DATE/TIME: 5/7/2023 8:07 PM CDT    INDICATION: SOB, COVID  COMPARISON: 06/22/2016      Impression    IMPRESSION: Negative chest.   XR Knee Port Right 3 Views    Narrative    EXAM: XR KNEE PORT RIGHT 3 VIEWS  LOCATION: [facility]  DATE/TIME: 5/7/2023 8:08 PM CDT    INDICATION: fall, right knee pain  COMPARISON: None.      Impression    IMPRESSION: Mild patellofemoral compartment degenerative change. No fracture or joint effusion.       PROCEDURES:   Procedures   --------------------------------------------------------------------------------   Cardiac telemetry monitoring ordered,  reviewed, and independently interpreted by me while patient was in the Emergency Department. Revealed no acute abnormalities.  --------------------------------------------------------------------------------     Critical Care     Performed by:   Raji Mckeon MD   Authorized by:   Raji Mckeon MD  Total critical care time: 90 minutes (Critical care time was exclusive of separately billable procedures and treating other patients.)    Critical care was necessary to treat or prevent imminent or life-threatening deterioration of the following conditions: COVID disease with hypoxia requiring IV steroids, remdesivir, supplemental O2, admission    Critical care was time spent personally by me on the following activities:  - obtaining history from patient or surrogate  - examination of patient  - development of treatment plan with patient or surrogate  - ordering and performing treatments and interventions  - ordering and review of laboratory studies  - ordering and review of radiographic studies  - re-evaluation of patient's condition  - monitoring for potential decompensation  - discussion with consultants  ---------------------------------------------------------------------------------------------------------------------  ---------------------------------------------------------------------------------------------------------------------              --------------- ADDITIONAL MDM ---------------  History:  - Supplemental history from:       -- see above charting, if applicable: patient  - External Record(s) reviewed:       -- see above charting, if applicable       -- Inpatient/outpatient record, prior labs, prior imaging    Workup:  - Chart documentation above includes differential considered and any EKGs or imaging independently interpreted by provider.  - In additional to work up documented, I considered the following work up:       -- see above charting, if applicable    External Consultation:  -  Discussion of management with another provider:       -- see above charting, if applicable    Complicating Factors:  - Care impacted by chronic illness:       -- see above MDM, past medical history, & problem list  - Care affected by social determinants of health:       -- see above social history    Disposition Considerations:  - Admit         I, Betsy Gallo, am serving as a scribe to document services personally performed by Dr. Raji Mckeon based on my observation and the provider's statements to me. I, Raji Mckeon MD attest that Betsy Gallo is acting in a scribe capacity, has observed my performance of the services and has documented them in accordance with my direction.      Raji Mckeon MD  05/07/23  Emergency Medicine  [facility] EMERGENCY ROOM  [address] [place] [address]  [phone]  Dept: [phone]     Raji Mckeon MD  05/07/23 9432

## 2023-05-08 NOTE — H&P
[facility]    History and Physical - Hospitalist Service, GOLD TEAM        Date of Admission:  5/8/2023    Assessment & Plan      Khari Jamison is a 67 year old male admitted on 5/8/2023. He has a history of atrial fibrillation/flutter on apixaban, obesity, hypertension, and hyperlipidemia who presents with many days of fatigue and poor appetite and one day of increased cough, fatigue obesity, shortness of breath, myalgias and generalized weakness, found to have COVID-19 infection.    #) Acute hypoxemic respiratory failure  #) COVID-19 infection    Patient meets moderate to severe criteria (the latter if oxygen needs are more consistent).  Appears to be about on day 7 of his illness and he has received all recommended COVID-19 vaccinations.    - admit to inpatient  - daily CBC, CRP, BMP  - CK mildly elevated, follow  - RDV x 5 day course or discharge, whichever is sooner.  - dexamethasone for 5-10 day course, 6 mg/day  - continue abixaban  - antipyresis PRN      #) Type 2 Diabetes Mellitus:  On trulicity as outpatient.    - hold outpatient meds  - start sliding scale insulin, add long acting as needed depending on today's needs  - FSBG QAC+ at bedtime  - continue pregabalin    #) Hypertension: continue losartan    #) Hypomagnesemia:  Replace PRN    #) GASTON:  Continue home CPAP       Diet: Regular Diet Adult    DVT Prophylaxis: DOAC  Nice Catheter: Not present  Lines: None     Cardiac Monitoring: None  Code Status: Full Code      Clinically Significant Risk Factors Present on Admission            # Hypomagnesemia: Lowest Mg = 1.2 mg/dL in last 2 days, will replace as needed    # Drug Induced Coagulation Defect: home medication list includes an anticoagulant medication    # Hypertension: home medication list includes antihypertensive(s)   # Non-Invasive mechanical ventilation: current O2 Device: BiPAP/CPAP  # Acute hypoxic respiratory failure: continue supplemental O2 as  "needed    # DMII: A1C = 6.5 % (Ref range: <5.7 %) within past 6 months    # Severe Obesity: Estimated body mass index is 43.65 kg/m  as calculated from the following:    Height as of this encounter: 1.879 m (6' 1.98\").    Weight as of this encounter: 154.1 kg (339 lb 11.7 oz).           Disposition Plan      Expected Discharge Date: 05/10/2023                  Kingston Pool MD  Hospitalist Service, [facility]  Securely message with [employer] (more info)  Text page via [facility] Paging/Directory   See signed in provider for up to date coverage information    ______________________________________________________________________    Chief Complaint      Weakness and shortness of breath     History is obtained from the patient    History of Present Illness   Khari Jamison is a 67 year old male who has a history of atrial fibrillation/flutter on apixaban, obesity, hypertension, and hyperlipidemia who presents with many days of fatigue and poor appetite and one day of increased cough, fatigue obesity, shortness of breath, myalgias and generalized weakness, found to have COVID-19 infection.    He was in his usual state of health until about a week ago when he developed fatigue and poor appetite but a paucity of other symptoms.  The day prior to to admission he developed a cough, marked myalgias, chills, and shortness of breath especially with ambulation; he took a home COVID test that was positive and he presented to [facility] for further care.      In the [place] Department the test was confirmed and he was given RDV and dexamethasone and admitted for further care.       Past Medical History    Past Medical History:   Diagnosis Date     Arrhythmia     a-fib     Atrial fibrillation (H)      Back pain      Back pain      Diabetes mellitus (H)      Hyperlipemia      Kidney stone      Obesity      GASTON (obstructive sleep apnea)     CPAP     PONV (postoperative nausea " and vomiting)     postoperatively with oral pain meds     Status post ablation of atrial fibrillation 1/27/2016    PVI Sept 2014 (cryo-PVI + roof line + SAMUEL line)       Past Surgical History   Past Surgical History:   Procedure Laterality Date     CARDIAC ELECTROPHYSIOLOGY MAPPING AND ABLATION  1/27/16    pvi     CARDIOVERSION  10/31/14     CARDIOVERSION  05/16/2016     COLONOSCOPY N/A 11/18/2015    Procedure: COLONOSCOPY WITH POLYPECTOMY USING BIOPSY FORCEP;  Surgeon: Coco Vallecillo MD;  Location: [facility];  Service:      COMBINED CYSTOSCOPY, INSERT STENT URETER(S) Bilateral 8/20/2015    Procedure: CYSTOSCOPY, BILATERAL STENT REMOVAL, LEFT URETEROSCOPY, STONE EXTRACTION, LEFT STENT INSERTION;  Surgeon: Ansley Jeffery MD;  Location: [facility];  Service:      CYSTOSCOPY W/ LASER LITHOTRIPSY  aug 2015     OTHER SURGICAL HISTORY  9/4/14    pulmonary vein isolation     OTHER SURGICAL HISTORY      eyelid lift     WA ABLATE HEART DYSRHYTHM FOCUS      Description: Catheter Ablation Atrial Fibrillation;  Recorded: 10/08/2014;  Comments: 9/4/14 PVI Cyro to 4 PVs; Roof and SAMUEL lines done.     WA CARDIOVERSION ELECTIVE ARRHYTHMIA EXTERNAL  09/11/2015    Description: Elective Cardioversion External;  Recorded: 03/06/2014;  Comments: 10/11/13; ; 11/27/13 and reverted to afib after 11 days on mod dose sotalol.; ; 1/3/14  sinus on Amio     WA CARDIOVERSION ELECTIVE ARRHYTHMIA EXTERNAL      Description: Elective Cardioversion External;  Recorded: 11/19/2014;  Comments: 10/11/13; ; 11/27/13 and reverted to afib after 11 days on mod dose sotalol.; ; 1/3/14  sinus on Amio.  10/31/14     WA CYSTO/URETERO W/LITHOTRIPSY &INDWELL STENT INSRT Right 7/16/2019    Procedure: CYSTOSCOPY RIGHT URETEROSCOPY, LASER  LITHOTRIPSY STENT INSERTION;  Surgeon: Kingston Kurtz MD;  Location: [facility];  Service: Urology     REMOVAL OF SPERM DUCT(S)      Description: Surgery Of Male Genitalia Vasectomy;  Recorded:  10/29/2013;     TONSILLECTOMY       VASECTOMY         Prior to Admission Medications   Prior to Admission Medications   Prescriptions Last Dose Informant Patient Reported? Taking?   Continuous Blood Gluc Sensor (FREESTYLE OLEGARIO 2 SENSOR) [facility] 2023  Yes Yes   Si each every 14 days Change every 14 days.   TRULICITY 1.5 MG/0.5ML pen 2023  Yes Yes   Sig: Inject 1.5 mg Subcutaneous every 7 days Every Sundays   Vitamin D3 (VITAMIN D, CHOLECALCIFEROL,) 25 mcg (1000 units) tablet 2023  Yes Yes   Sig: Take 1 tablet by mouth every morning   apixaban ANTICOAGULANT (ELIQUIS) 5 mg Tab tablet 2023  No Yes   Sig: [APIXABAN ANTICOAGULANT (ELIQUIS) 5 MG TAB TABLET] Take 1 tablet (5 mg total) by mouth every 12 (twelve) hours.   levothyroxine (SYNTHROID, LEVOTHROID) 88 MCG tablet 2023  Yes Yes   Sig: Take 88 mcg by mouth At Bedtime   losartan (COZAAR) 25 MG tablet 2023  No Yes   Sig: Take 1 tablet (25 mg) by mouth daily   metFORMIN (GLUCOPHAGE XR) 500 MG 24 hr tablet 2023  Yes Yes   Sig: Take 1,000 mg by mouth 2 times daily (with meals)   pregabalin (LYRICA) 75 MG capsule 2023  Yes Yes   Sig: Take 75 mg by mouth every morning   tamsulosin (FLOMAX) 0.4 MG capsule 2023  Yes Yes   Sig: Take 0.4 mg by mouth At Bedtime      Facility-Administered Medications: None        Review of Systems    The 10 point Review of Systems is negative other than noted in the HPI or here.      Physical Exam   Vital Signs: Temp: 97.1  F (36.2  C) Temp src: Axillary BP: 122/67 Pulse: 68   Resp: 20 SpO2: 96 % O2 Device: BiPAP/CPAP Oxygen Delivery: 4 LPM  Weight: 339 lbs 11.66 oz    General Appearance:  Lying in bed at 30 degrees, uncomfortable but non toxic  Eyes: anicteric, extraocular muscles intact.    HEENT: mucous membranes moist.  No oral lesions.   Respiratory: mildly increased work of breathing and tachypnea but no adventitious lung sounds  Cardiovascular: normal rate, irregular. No murmurs, rubs, or gallops.   GI:  obese, soft, non-distended.  No hepatosplenomegaly or mass.  Lymph/Hematologic: no cervical lymphadenopathy.   Skin: no rash  Musculoskeletal: no lower extremity edema.       Medical Decision Making       NOTE(S)/MEDICAL RECORDS REVIEWED over the past 24 hours: outside notes  - ongoing diagnosis and management of respiratory failure      Data     I have personally reviewed the following data over the past 24 hrs:    9.1  \   14.7   / 176     141 103 13 /  199 (H)   4.2 25 1.22 \       ALT: 31 AST: 20 AP: 98 TBILI: 0.8   ALB: 3.7 TOT PROTEIN: 7.2 LIPASE: N/A       Trop: N/A BNP: 110 (H)       TSH: 2.03 T4: N/A A1C: 6.5 (H)       Procal: 0.04 CRP: 3.2 (H)  47.95 (H) Lactic Acid: N/A       INR:  1.06 PTT:  N/A   D-dimer:  1.09 (H) Fibrinogen:  N/A       Imaging results reviewed over the past 24 hrs:   Recent Results (from the past 24 hour(s))   XR Chest Port 1 View    Narrative    EXAM: XR CHEST PORT 1 VIEW  LOCATION: [facility]  DATE/TIME: 5/7/2023 8:07 PM CDT    INDICATION: SOB, COVID  COMPARISON: 06/22/2016      Impression    IMPRESSION: Negative chest.   XR Knee Port Right 3 Views    Narrative    EXAM: XR KNEE PORT RIGHT 3 VIEWS  LOCATION: [facility]  DATE/TIME: 5/7/2023 8:08 PM CDT    INDICATION: fall, right knee pain  COMPARISON: None.      Impression    IMPRESSION: Mild patellofemoral compartment degenerative change. No fracture or joint effusion.

## 2023-05-08 NOTE — DISCHARGE SUMMARY
[facility]  Hospitalist Discharge Summary      Date of Admission:  5/8/2023  Date of Discharge:  5/8/2023  Discharging Provider: Uche Donaldson MD  Discharge Service: Hospitalist Service, GOLD TEAM 21    Discharge Diagnoses   Covid-19 Infection  Type 2 Diabetes Mellitus  Hypertension  Hypomagnesemia  GASTON    Follow-ups Needed After Discharge   Follow-up Appointments     Adult [facility] Follow-up and recommended labs and tests      Follow up with primary care provider, Shi Sneed, as needed for   hospital follow- up.  No follow up labs or test are needed.      Appointments on [place] and/or [facility] (with [facility] or [facility]   provider or service). Call [phone] if you haven't heard regarding   these appointments within 7 days of discharge.             Unresulted Labs Ordered in the Past 30 Days of this Admission     No orders found from 4/8/2023 to 5/9/2023.      These results will be followed up by NA    Discharge Disposition   Discharged to home  Condition at discharge: Stable    Hospital Course      Khari Jamison is a 67 year old male admitted on 5/8/2023. He has a history of atrial fibrillation/flutter on apixaban, obesity, hypertension, and hyperlipidemia who presents with many days of fatigue and poor appetite and one day of increased cough, fatigue obesity, shortness of breath, myalgias and generalized weakness, found to have COVID-19 infection.    #) Acute hypoxemic respiratory failure  #) COVID-19 infection  Patient met moderate to severe criteria (the latter if oxygen needs are more consistent).  Appears to be about on day 7 of his illness and he has received all recommended COVID-19 vaccinations. Admitted overnight and started on dexamethasone and remdesivir. Felt considerably better after a few hours and was saturating in the mid-90s on room air. He requested discharge and will be discharged with a prescription to continue with a 5-day course of  "dexamethasone as an outpatient.     #) Type 2 Diabetes Mellitus:    On trulicity as outpatient which was held on admission. Started a sliding scale insulin, with the intent to add long acting as needed depending on needs. No long acting was required while inpatient.     #) Hypertension: Continued losartan    #) Hypomagnesemia:  Replaced PRN    #) GSATON:  Continued home CPAP      Consultations This Hospital Stay   None    Code Status   Full Code    Time Spent on this Encounter   I, Uche Donaldson MD, personally saw the patient today and spent greater than 30 minutes discharging this patient.       Uche Donaldson MD  [facility] [address]  On license of [facility] [address]  Phone: [phone]  Fax: [phone]  ______________________________________________________________________    Physical Exam   BP (!) 148/56 (BP Location: Left arm, Patient Position: Sitting, Cuff Size: Adult Large)   Pulse 70   Temp (!) 96.6  F (35.9  C) (Oral)   Resp 20   Ht 1.879 m (6' 1.98\")   Wt (!) 154.1 kg (339 lb 11.7 oz)   SpO2 96%   BMI 43.65 kg/m    General: AAOx3, well appearing man in NAD  Skin: no rashes or bruising  CV: RRR, normal S1S2, no murmur  Resp: CTAB, no wheeze, rhonchi   Abd: Soft, nontender, nondistended, BS+, no masses appreciated  Extremities: warm and well perfused, no edema           Primary Care Physician   Shi Sneed    Discharge Orders      Reason for your hospital stay    Dear Khari Jamison,    Your were hospitalized at [facility] with Covid-19 and treated with steroids and oxygen.  Over your hospitalization your condition improved and today you are ready to be discharged home.  You should continue to improve but if you develop fever, shortness of breath, worsening cough, difficulty breathing or chest pain please seek medical attention.    We are suggesting the following medication changes:  START dexamethasone 6mg daily for 5 days.     Please get the following " tests done:  N/A    Please set up an appointment with:  Your primary care doctor as needed.     It was a pleasure meeting with you today. Thank you for allowing me and my team the privilege of caring for you during your hospitalization. You are the reason we are here, and I truly hope we provided you with the excellent service you deserve. Please let us know if there is anything else we can do for you so that we can be sure you are leaving completely satisfied with your care experience.    Sincerely,    Uche Donaldson  Internal Medicine Hospitalist  [facility]     Activity    Your activity upon discharge: activity as tolerated     Adult [facility] Follow-up and recommended labs and tests    Follow up with primary care provider, Shi Sneed, as needed for hospital follow- up.  No follow up labs or test are needed.      Appointments on [place] and/or [facility] (with [facility] or [facility] provider or service). Call [phone] if you haven't heard regarding these appointments within 7 days of discharge.     Diet    Follow this diet upon discharge: Orders Placed This Encounter      Regular Diet Adult       Significant Results and Procedures   Most Recent 3 CBC's:Recent Labs   Lab Test 05/07/23 1944   WBC 9.1   HGB 14.7   MCV 89        Most Recent 3 BMP's:Recent Labs   Lab Test 05/07/23  1944 04/19/23  1553 03/01/23  1319    141 142   POTASSIUM 4.2 4.3 4.9   CHLORIDE 103 106 100   CO2 25 24 26   BUN 13 14 15.3   CR 1.22 1.02 1.13   ANIONGAP 13 11 16*   ASHLEIGH 9.1 9.7 9.9   * 146* 182*   ,   Results for orders placed or performed during the hospital encounter of 05/07/23   XR Chest Port 1 View    Narrative    EXAM: XR CHEST PORT 1 VIEW  LOCATION: [facility]  DATE/TIME: 5/7/2023 8:07 PM CDT    INDICATION: SOB, COVID  COMPARISON: 06/22/2016      Impression    IMPRESSION: Negative chest.   XR Knee Port Right 3 Views    Narrative    EXAM: XR KNEE PORT RIGHT 3  VIEWS  LOCATION: [facility]  DATE/TIME: 5/7/2023 8:08 PM CDT    INDICATION: fall, right knee pain  COMPARISON: None.      Impression    IMPRESSION: Mild patellofemoral compartment degenerative change. No fracture or joint effusion.       Discharge Medications   Current Discharge Medication List      START taking these medications    Details   dexamethasone (DECADRON) 6 MG tablet Take 1 tablet (6 mg) by mouth daily for 5 days  Qty: 5 tablet, Refills: 0    Associated Diagnoses: 2019 novel coronavirus disease (COVID-19)         CONTINUE these medications which have NOT CHANGED    Details   apixaban ANTICOAGULANT (ELIQUIS) 5 mg Tab tablet [APIXABAN ANTICOAGULANT (ELIQUIS) 5 MG TAB TABLET] Take 1 tablet (5 mg total) by mouth every 12 (twelve) hours.  Qty: 180 tablet, Refills: 1    Associated Diagnoses: Atrial fibrillation (H)      Continuous Blood Gluc Sensor (FREESTYLE OLEGARIO 2 SENSOR) MISC 1 each every 14 days Change every 14 days.      levothyroxine (SYNTHROID, LEVOTHROID) 88 MCG tablet Take 88 mcg by mouth At Bedtime      losartan (COZAAR) 25 MG tablet Take 1 tablet (25 mg) by mouth daily  Qty: 90 tablet, Refills: 3    Associated Diagnoses: Essential hypertension      metFORMIN (GLUCOPHAGE XR) 500 MG 24 hr tablet Take 1,000 mg by mouth 2 times daily (with meals)      pregabalin (LYRICA) 75 MG capsule Take 75 mg by mouth every morning      tamsulosin (FLOMAX) 0.4 MG capsule Take 0.4 mg by mouth At Bedtime      TRULICITY 1.5 MG/0.5ML pen Inject 1.5 mg Subcutaneous every 7 days Every Sundays      Vitamin D3 (VITAMIN D, CHOLECALCIFEROL,) 25 mcg (1000 units) tablet Take 1 tablet by mouth every morning           Allergies   Allergies   Allergen Reactions     Ezetimibe GI Disturbance     Simvastatin      Other reaction(s): myalgias, arthralgias

## 2023-05-08 NOTE — PHARMACY-ADMISSION MEDICATION HISTORY
Pharmacist Admission Medication History    Admission medication history is complete. The information provided in this note is only as accurate as the sources available at the time of the update.    Medication reconciliation/reorder completed by provider prior to medication history? No    Information Source(s): Patient and CareEverywhere/SureScripts via in-person    Pertinent Information: none    Changes made to PTA medication list:    Added: None    Deleted: None    Changed: None    Medication Affordability:  Not including over the counter (OTC) medications, was there a time in the past 12 months when you did not take your medications as prescribed because of cost?: No    Allergies reviewed with patient and updates made in EHR: yes    Medication History Completed By: Lolita Smallwood RPH 5/7/2023 9:05 PM    Prior to Admission medications    Medication Sig Last Dose Taking? Auth Provider Long Term End Date   apixaban ANTICOAGULANT (ELIQUIS) 5 mg Tab tablet [APIXABAN ANTICOAGULANT (ELIQUIS) 5 MG TAB TABLET] Take 1 tablet (5 mg total) by mouth every 12 (twelve) hours. 5/7/2023 at AM Yes Vincent Klein MD Yes    levothyroxine (SYNTHROID, LEVOTHROID) 88 MCG tablet Take 88 mcg by mouth At Bedtime 5/6/2023 at hs Yes Provider, Historical     losartan (COZAAR) 25 MG tablet Take 1 tablet (25 mg) by mouth daily 5/6/2023 at hs Yes Mary Kate Hermosillo, APRN CNP Yes    metFORMIN (GLUCOPHAGE XR) 500 MG 24 hr tablet Take 1,000 mg by mouth 2 times daily (with meals) 5/7/2023 at AM Yes Unknown, Entered By History     pregabalin (LYRICA) 75 MG capsule Take 75 mg by mouth every morning 5/7/2023 at AM Yes Provider, Historical     tamsulosin (FLOMAX) 0.4 MG capsule Take 0.4 mg by mouth At Bedtime 5/6/2023 at hs Yes Reported, Patient     TRULICITY 1.5 MG/0.5ML pen Inject 1.5 mg Subcutaneous every 7 days Every Sundays 5/7/2023 at AM Yes Reported, Patient No    Vitamin D3 (VITAMIN D, CHOLECALCIFEROL,) 25 mcg (1000 units) tablet Take 1 tablet by  mouth every morning 5/7/2023 at AM Yes Reported, Patient     Continuous Blood Gluc Sensor (FREESTYLE OLEGARIO 2 SENSOR) MISC 1 each every 14 days Change every 14 days.   Reported, Patient

## 2023-05-08 NOTE — PLAN OF CARE
Arrived on the floor at 0200, direct admit from [facility] with c/o SOB and fatigue. MD is tested positive for COVID-19. Pt A/O X 4. Afebrile. VS WNL. Lungs sound CTA, a little diminished, equal bilaterally, both anterior and posterior. IS encouraged. O2 started at 2 L with O2Sat bet 95-97%, then switched to home CPAP and O2sat is still WNL. Ambulated to bathroom SBA with steady gait and balance, no dizziness and light-headedness. A little short of breath observed with activity/ambulation. Bowel sound active in all quadrants, last BM was yesterday. Voids spontaneously without difficulty in the bathroom. He is on regular diet, snack offered when he arrived on the floor and appetite was good. Denies nausea and vomiting. CMS and Neuro's are intact. Denies numbness and tingling in all extremities. MD is diabetic and has insulin monitor on his L arm and prefers to use that one to check his BG. Has pain in the R lower chest R/T his recent fall at home prior to ED admission in [facility]. PRN Tylenol given once, coldpack offered but refused. PIV patent in R and L forearm and saline locked. RN managed electrolyte protocol. Mag was 1.2 and was replced in [facility], to recheck today.

## 2023-05-17 ENCOUNTER — TELEPHONE (OUTPATIENT)
Dept: CARDIOLOGY | Facility: CLINIC | Age: 68
End: 2023-05-17
Payer: COMMERCIAL

## 2023-05-17 DIAGNOSIS — I48.19 PERSISTENT ATRIAL FIBRILLATION (H): Primary | ICD-10-CM

## 2023-05-17 NOTE — TELEPHONE ENCOUNTER
Patient called and wondering why he has not been contacted for scheduling his CT scan. Apologized and forwarded to scheduling to get ahold of patient ASAP. Needing CT, Echo, SDM. -SC

## 2023-06-01 ENCOUNTER — HOSPITAL ENCOUNTER (OUTPATIENT)
Dept: CARDIOLOGY | Facility: CLINIC | Age: 68
Discharge: HOME | End: 2023-06-01
Attending: NURSE PRACTITIONER | Admitting: NURSE PRACTITIONER
Payer: COMMERCIAL

## 2023-06-01 DIAGNOSIS — I48.19 PERSISTENT ATRIAL FIBRILLATION (H): ICD-10-CM

## 2023-06-01 PROCEDURE — 255N000002 HC RX 255 OP 636: Performed by: NURSE PRACTITIONER

## 2023-06-01 PROCEDURE — 93306 TTE W/DOPPLER COMPLETE: CPT | Mod: 26 | Performed by: INTERNAL MEDICINE

## 2023-06-01 RX ADMIN — PERFLUTREN 2 ML: 6.52 INJECTION, SUSPENSION INTRAVENOUS at 14:45

## 2023-06-07 ENCOUNTER — OFFICE VISIT (OUTPATIENT)
Dept: CARDIOLOGY | Facility: CLINIC | Age: 68
End: 2023-06-07
Payer: COMMERCIAL

## 2023-06-07 ENCOUNTER — HOSPITAL ENCOUNTER (OUTPATIENT)
Dept: CT IMAGING | Facility: CLINIC | Age: 68
Discharge: HOME | End: 2023-06-07
Attending: NURSE PRACTITIONER | Admitting: NURSE PRACTITIONER
Payer: COMMERCIAL

## 2023-06-07 VITALS
DIASTOLIC BLOOD PRESSURE: 64 MMHG | RESPIRATION RATE: 16 BRPM | SYSTOLIC BLOOD PRESSURE: 138 MMHG | HEART RATE: 75 BPM | BODY MASS INDEX: 43.3 KG/M2 | WEIGHT: 315 LBS | OXYGEN SATURATION: 97 %

## 2023-06-07 DIAGNOSIS — I48.19 PERSISTENT ATRIAL FIBRILLATION (H): ICD-10-CM

## 2023-06-07 DIAGNOSIS — I10 ESSENTIAL HYPERTENSION: ICD-10-CM

## 2023-06-07 DIAGNOSIS — I48.19 PERSISTENT ATRIAL FIBRILLATION (H): Primary | ICD-10-CM

## 2023-06-07 DIAGNOSIS — Z78.9 STATIN INTOLERANCE: ICD-10-CM

## 2023-06-07 DIAGNOSIS — E78.5 HYPERLIPIDEMIA, UNSPECIFIED HYPERLIPIDEMIA TYPE: ICD-10-CM

## 2023-06-07 DIAGNOSIS — E11.9 TYPE 2 DIABETES MELLITUS WITHOUT COMPLICATION, WITHOUT LONG-TERM CURRENT USE OF INSULIN (H): ICD-10-CM

## 2023-06-07 LAB
CREAT BLD-MCNC: 1.2 MG/DL (ref 0.7–1.3)
GFR SERPL CREATININE-BSD FRML MDRD: >60 ML/MIN/1.73M2

## 2023-06-07 PROCEDURE — 75572 CT HRT W/3D IMAGE: CPT

## 2023-06-07 PROCEDURE — 82565 ASSAY OF CREATININE: CPT

## 2023-06-07 PROCEDURE — 99214 OFFICE O/P EST MOD 30 MIN: CPT | Performed by: GENERAL ACUTE CARE HOSPITAL

## 2023-06-07 PROCEDURE — 75572 CT HRT W/3D IMAGE: CPT | Mod: 26 | Performed by: GENERAL ACUTE CARE HOSPITAL

## 2023-06-07 PROCEDURE — 250N000011 HC RX IP 250 OP 636: Performed by: NURSE PRACTITIONER

## 2023-06-07 RX ORDER — IOPAMIDOL 755 MG/ML
120 INJECTION, SOLUTION INTRAVASCULAR ONCE
Status: COMPLETED | OUTPATIENT
Start: 2023-06-07 | End: 2023-06-07

## 2023-06-07 RX ADMIN — IOPAMIDOL 120 ML: 755 INJECTION, SOLUTION INTRAVENOUS at 15:19

## 2023-06-07 NOTE — PROGRESS NOTES
HEART CARE ENCOUNTER NOTE          Assessment/Recommendations   Assessment:    Persistent atrial fibrillation/atrial flutter status post pulmonary vein isolation with left atrial roof and mitral annular isthmus ablation 9/4/2014 then left atrial complex fractionated atrial electograms and cavotricuspid isthmus ablation 1/27/2016. [id number] score is at least 3 (hypertension, age 65-74, diabetes mellitus). HAS-BLED score is at least 2 (age greater than 65, easy bruising/bleeding).  Essential hypertension. Controlled.  Hyperlipidemia. Last  mg/dL.  Intolerance to multiple statins due to myalgias.  Non insulin-dependent diabetes mellitus type 2. Last hemoglobin A1c 6.5%.  Obstructive sleep apnea on home CPAP  BMI 43.30.    Plan:  Patient is a good candidate to proceed with Watchman percutaneous left atrial appendage closure device pending the results of hs CT cardiac angiogram today.  If he is noted to have significant atherosclerosis on his CT scan, I did recommend a trial of a PCSK9 inhibitor to which he is agreeable.  Follow-up in general cardiology as needed.    (Patient) Has documented nonvalvular atrial fibrillation (NVAF) and is currently on oral anticoagulant therapy Eliquis   [id number] -VASc = 3 (hypertension, age 65-74, diabetes mellitus) HAS-BLED risk score: 2 (age greater than 65, easy bruising/bleeding).  Indication(s) to consider non-oral anticoagulation therapy: easy bleeding/bruising  Pt has no contra-indication to come off oral anti-coagulant therapy if LAAC device is successfully placed.     I have personally reviewed the patients chart and discussed the following with the patient/family; 1) The choices available for reducing stroke risk from atrial fibrillation, 2) Treatment options available including respective risk/benefits, and 3) What factors are most important for the patient in making their decision.  The [facility] shared decision making tool  https://www.cardiosmart.org/SDM/Decision-Aids/Find-Decision-Aids/Atrial-Fibrillation  was used to guide this conversation.   The patient was counseled that their decision could be made at this time or in the future if more time was needed to consider their decision.       The patient is an appropriate candidate to proceed with left atrial appendage screening and implant.          History of Present Illness   Mr. Khari Jamison is a 68 year old male with a significant past history of persistent AF s/p PVI, LA roof, and SAMUEL ablation 9/4/2014 and left atrial CFE and CTI ablation 1/27/2016, HTN, HLD, NIDDM2 and GASTON on CPAP presenting for a shared decision making visit prior to possible Watchman percutaneous left atrial appendage closure device placement.    He has very rare mild palpitations since his second ablation. He has been on apixaban for years but does bleed and bruise easily. He was feeling well until being hospitalized for COVID-19 infection last months and has been slow to recover. No exertional chest pain/pressure/tightness, shortness of breath at rest or with exertion, light headedness/dizziness, pre-syncope, syncope, lower extremity swelling,  paroxysmal nocturnal dyspnea (PND), or orthopnea.    He previously tried 5-6 statins, which all caused severe myalgias     Cardiac Problems and Cardiac Diagnostics     Most Recent Cardiac testing:  ECG dated 5/7/2023 (personaly reviewed and interpreted): SR, PACs and PVCs    Cardiac event monitor 1/9/2020 (report reviewed):  Results:    Indication for study: Atrial fibrillation    The monitor was worn: Multi-channel patient activated monitor was worn from January 9 through January 11, 2020.  Compliance with the monitor was excellent.    The baseline rhythm transmission demonstrated normal sinus rhythm with heart rate in the 70s.  NY interval, QRS duration and QT intervals were all normal.  Rare isolated PAC.    Heart rates ranged between 4718 bpm with an average of  "60 bpm.    Rare atrial and ventricular ectopy, averaging 1% or less.    The patient had 16 manually activated rhythm recordings.  Symptoms were reported to be \"other\".  The patient's rhythm demonstrated normal sinus rhythm with heart rates ranging between 47 and 80 bpm.  And 9 of the 16 episodes sinus rhythm alone was detected.  In the other 7 episodes the patient had isolated PACs or PVCs.  There was no evidence of atrial fibrillation or flutter.    The patient had 1 auto triggered recordings.  Symptoms were not reported.  The patient's rhythm demonstrated normal sinus rhythm.     Impression:    Essentially normal 48-hour patient activated monitor    Indication for study: Atrial fibrillation.  Although the patient had symptoms there was no indication of any atrial fibrillation or flutter.  The patient's symptomatic recordings had a possible correlation with simple ectopy however the majority of the symptomatic recordings demonstrated no ectopy or other pathologic rhythm disturbance.    No evidence of sustained atrial or ventricular tachyarrhythmia    No profound bradycardia or pauses.    ECHO 6/1/2023 (report reviewed):   1. Normal left ventricular size and systolic performance with a visually estimated ejection fraction of 60%.  2. No significant valvular heart disease is identified on this study.  3. Mild right ventricular enlargement with normal right ventricular systolic performance.  4. There is mild to moderate left atrial enlargement.    MR pulmonary veins 7/18/2014 (report reviewed):   1.  There are 4 pulmonary veins with normal configuration.   2.  Esophagus is adjacent to the posterior side of left atrial body.   3.  Normal left ventricular size and function.  Left ventricular ejection fraction is quantitated at 73%.  No myocardial scar.  4.  Normal right ventricular size and function.  5.  There is no obvious valvular disease.   6.  Moderately enlarge left atrial size.     Medications  Allergies "   Current Outpatient Medications   Medication Sig Dispense Refill     apixaban ANTICOAGULANT (ELIQUIS) 5 mg Tab tablet [APIXABAN ANTICOAGULANT (ELIQUIS) 5 MG TAB TABLET] Take 1 tablet (5 mg total) by mouth every 12 (twelve) hours. 180 tablet 1     levothyroxine (SYNTHROID, LEVOTHROID) 88 MCG tablet Take 88 mcg by mouth At Bedtime       losartan (COZAAR) 25 MG tablet Take 1 tablet (25 mg) by mouth daily 90 tablet 3     metFORMIN (GLUCOPHAGE XR) 500 MG 24 hr tablet Take 1,000 mg by mouth 2 times daily (with meals)       pregabalin (LYRICA) 75 MG capsule Take 75 mg by mouth every morning       tamsulosin (FLOMAX) 0.4 MG capsule Take 0.4 mg by mouth At Bedtime       TRULICITY 1.5 MG/0.5ML pen Inject 1.5 mg Subcutaneous every 7 days Every Sundays       Vitamin D3 (VITAMIN D, CHOLECALCIFEROL,) 25 mcg (1000 units) tablet Take 1 tablet by mouth every morning       Continuous Blood Gluc Sensor (FREESTYLE OLEGARIO 2 SENSOR) MISC 1 each every 14 days Change every 14 days. (Patient not taking: Reported on 6/7/2023)       dexamethasone (DECADRON) 6 MG tablet Take 1 tablet (6 mg) by mouth daily for 5 days 5 tablet 0      Allergies   Allergen Reactions     Ezetimibe GI Disturbance     Simvastatin      Other reaction(s): myalgias, arthralgias        Physical Examination Review of Systems   /64 (BP Location: Left arm, Patient Position: Sitting, Cuff Size: Adult Large)   Pulse 75   Resp 16   Wt (!) 152.9 kg (337 lb)   SpO2 97%   BMI 43.30 kg/m    Body mass index is 43.3 kg/m .  Wt Readings from Last 3 Encounters:   06/07/23 (!) 152.9 kg (337 lb)   05/08/23 (!) 154.1 kg (339 lb 11.7 oz)   05/07/23 (!) 158.8 kg (350 lb)       General Appearance:   Pleasant  male, appears  stated age. no acute distress, obese body habitus   ENT/Mouth: membranes moist, no apparent gingival bleeding.      EYES:  no scleral icterus, normal conjunctivae   Neck: no carotid bruits. No anterior cervical lymphadenopaty   Respiratory:   lungs are clear  to auscultation, no rales or wheezing,  equal chest wall expansion    Cardiovascular:   Regular rhythm, normal rate. Normal first and second heart sounds with no murmurs, rubs, or gallops; the carotid, radial and posterior tibial pulses are intact, Jugular venous pressure normal, no edema bilaterally    Abdomen/GI:  no organomegaly, masses, bruits, or tenderness; bowel sounds are present   Extremities: no cyanosis or clubbing   Skin: no xanthelasma, warm.    Heme/lymph/ Immunology No apparent bleeding noted.   Neurologic: Alert and oriented. normal gait, no tremors     Psychiatric: Pleasant, calm, appropriate affect.    A complete 10 system review of systems was performed and is negative except as mentioned in the HPI/subjective.         Past History   Past Medical History:   Past Medical History:   Diagnosis Date     Arrhythmia     a-fib     Atrial fibrillation (H)      Back pain      Back pain      Diabetes mellitus (H)      Hyperlipemia      Kidney stone      Obesity      GASTON (obstructive sleep apnea)     CPAP     PONV (postoperative nausea and vomiting)     postoperatively with oral pain meds     Status post ablation of atrial fibrillation 1/27/2016    PVI Sept 2014 (cryo-PVI + roof line + SAMUEL line)       Past Surgical History:   Past Surgical History:   Procedure Laterality Date     CARDIAC ELECTROPHYSIOLOGY MAPPING AND ABLATION  1/27/16    pvi     CARDIOVERSION  10/31/14     CARDIOVERSION  05/16/2016     COLONOSCOPY N/A 11/18/2015    Procedure: COLONOSCOPY WITH POLYPECTOMY USING BIOPSY FORCEP;  Surgeon: Coco Vallecillo MD;  Location: [facility] GI;  Service:      COMBINED CYSTOSCOPY, INSERT STENT URETER(S) Bilateral 8/20/2015    Procedure: CYSTOSCOPY, BILATERAL STENT REMOVAL, LEFT URETEROSCOPY, STONE EXTRACTION, LEFT STENT INSERTION;  Surgeon: Ansley Jeffery MD;  Location: [facility] OR;  Service:      CYSTOSCOPY W/ LASER LITHOTRIPSY  aug 2015     OTHER SURGICAL HISTORY  9/4/14    pulmonary vein  isolation     OTHER SURGICAL HISTORY      eyelid lift     NE ABLATE HEART DYSRHYTHM FOCUS      Description: Catheter Ablation Atrial Fibrillation;  Recorded: 10/08/2014;  Comments: 9/4/14 PVI Cyro to 4 PVs; Roof and SAMUEL lines done.     NE CARDIOVERSION ELECTIVE ARRHYTHMIA EXTERNAL  09/11/2015    Description: Elective Cardioversion External;  Recorded: 03/06/2014;  Comments: 10/11/13; ; 11/27/13 and reverted to afib after 11 days on mod dose sotalol.; ; 1/3/14  sinus on Amio     NE CARDIOVERSION ELECTIVE ARRHYTHMIA EXTERNAL      Description: Elective Cardioversion External;  Recorded: 11/19/2014;  Comments: 10/11/13; ; 11/27/13 and reverted to afib after 11 days on mod dose sotalol.; ; 1/3/14  sinus on Amio.  10/31/14     NE CYSTO/URETERO W/LITHOTRIPSY &INDWELL STENT INSRT Right 7/16/2019    Procedure: CYSTOSCOPY RIGHT URETEROSCOPY, LASER  LITHOTRIPSY STENT INSERTION;  Surgeon: Kingston Kurtz MD;  Location: [facility];  Service: Urology     REMOVAL OF SPERM DUCT(S)      Description: Surgery Of Male Genitalia Vasectomy;  Recorded: 10/29/2013;     TONSILLECTOMY       VASECTOMY         Family History:   Family History   Problem Relation Age of Onset     Cancer Mother         uterine     Diabetes Maternal Aunt      Urolithiasis No family hx of      Clotting Disorder No family hx of      Gout No family hx of      Heart Disease No family hx of      Anesthesia Reaction No family hx of        Social History:   Social History     Socioeconomic History     Marital status:      Spouse name: Not on file     Number of children: Not on file     Years of education: Not on file     Highest education level: Not on file   Occupational History     Not on file   Tobacco Use     Smoking status: Never     Smokeless tobacco: Never   Vaping Use     Vaping status: Not on file   Substance and Sexual Activity     Alcohol use: No     Alcohol/week: 1.0 standard drink of alcohol     Drug use: No     Sexual activity: Not on  file   Other Topics Concern     Not on file   Social History Narrative     Not on file     Social Determinants of Health     Financial Resource Strain: Not on file   Food Insecurity: Not on file   Transportation Needs: Not on file   Physical Activity: Not on file   Stress: Not on file   Social Connections: Not on file   Intimate Partner Violence: Not on file   Housing Stability: Not on file              Lab Results    Chemistry/lipid CBC Cardiac Enzymes/BNP/TSH/INR   Lab Results   Component Value Date    CHOL 286 (H) 03/01/2023    HDL 43 03/01/2023     (H) 03/01/2023    TRIG 187 (H) 03/01/2023    CR 1.22 05/07/2023    BUN 13 05/07/2023    POTASSIUM 4.2 05/07/2023     05/07/2023    CO2 25 05/07/2023      Lab Results   Component Value Date    WBC 9.1 05/07/2023    HGB 14.7 05/07/2023    HCT 44.4 05/07/2023    MCV 89 05/07/2023     05/07/2023    A1C 6.5 (H) 05/08/2023     Lab Results   Component Value Date    A1C 6.5 (H) 05/08/2023    Lab Results   Component Value Date     (H) 05/07/2023    TSH 2.03 05/07/2023    INR 1.06 05/08/2023          Homer العراقي MD [facility]  Non-Invasive Cardiologist  [facility]  Pager [phone]

## 2023-06-07 NOTE — PATIENT INSTRUCTIONS
You are a good candidate for a Watchman device.  If there is plaque buildup on your CT scan, I would recommend trying an injectable cholesterol medication, either Repatha or Praluent.

## 2023-06-07 NOTE — LETTER
6/7/2023    Shi Sneed MD  [address] [facility] [address]    RE: Khari Jamison       Dear Colleague,     I had the pleasure of seeing Khari Jamison in the [facility].  HEART CARE ENCOUNTER NOTE          Assessment/Recommendations   Assessment:    Persistent atrial fibrillation/atrial flutter status post pulmonary vein isolation with left atrial roof and mitral annular isthmus ablation 9/4/2014 then left atrial complex fractionated atrial electograms and cavotricuspid isthmus ablation 1/27/2016. [id number] score is at least 3 (hypertension, age 65-74, diabetes mellitus). HAS-BLED score is at least 2 (age greater than 65, easy bruising/bleeding).  Essential hypertension. Controlled.  Hyperlipidemia. Last  mg/dL.  Intolerance to multiple statins due to myalgias.  Non insulin-dependent diabetes mellitus type 2. Last hemoglobin A1c 6.5%.  Obstructive sleep apnea on home CPAP  BMI 43.30.    Plan:  Patient is a good candidate to proceed with Watchman percutaneous left atrial appendage closure device pending the results of hs CT cardiac angiogram today.  If he is noted to have significant atherosclerosis on his CT scan, I did recommend a trial of a PCSK9 inhibitor to which he is agreeable.  Follow-up in general cardiology as needed.    (Patient) Has documented nonvalvular atrial fibrillation (NVAF) and is currently on oral anticoagulant therapy Eliquis   [id number] -VASc = 3 (hypertension, age 65-74, diabetes mellitus) HAS-BLED risk score: 2 (age greater than 65, easy bruising/bleeding).  Indication(s) to consider non-oral anticoagulation therapy: easy bleeding/bruising  Pt has no contra-indication to come off oral anti-coagulant therapy if LAAC device is successfully placed.     I have personally reviewed the patients chart and discussed the following with the patient/family; 1) The choices available for reducing stroke risk from atrial fibrillation, 2) Treatment options  available including respective risk/benefits, and 3) What factors are most important for the patient in making their decision.  The ACC shared decision making tool https://www.cardiosmart.org/SDM/Decision-Aids/Find-Decision-Aids/Atrial-Fibrillation  was used to guide this conversation.   The patient was counseled that their decision could be made at this time or in the future if more time was needed to consider their decision.       The patient is an appropriate candidate to proceed with left atrial appendage screening and implant.          History of Present Illness   Mr. Khari Jamison is a 68 year old male with a significant past history of persistent AF s/p PVI, LA roof, and SAMUEL ablation 9/4/2014 and left atrial CFE and CTI ablation 1/27/2016, HTN, HLD, NIDDM2 and GASTON on CPAP presenting for a shared decision making visit prior to possible Watchman percutaneous left atrial appendage closure device placement.    He has very rare mild palpitations since his second ablation. He has been on apixaban for years but does bleed and bruise easily. He was feeling well until being hospitalized for COVID-19 infection last months and has been slow to recover. No exertional chest pain/pressure/tightness, shortness of breath at rest or with exertion, light headedness/dizziness, pre-syncope, syncope, lower extremity swelling,  paroxysmal nocturnal dyspnea (PND), or orthopnea.    He previously tried 5-6 statins, which all caused severe myalgias     Cardiac Problems and Cardiac Diagnostics     Most Recent Cardiac testing:  ECG dated 5/7/2023 (personaly reviewed and interpreted): SR, PACs and PVCs    Cardiac event monitor 1/9/2020 (report reviewed):  Results:    Indication for study: Atrial fibrillation    The monitor was worn: Multi-channel patient activated monitor was worn from January 9 through January 11, 2020.  Compliance with the monitor was excellent.    The baseline rhythm transmission demonstrated normal sinus rhythm  "with heart rate in the 70s.  IN interval, QRS duration and QT intervals were all normal.  Rare isolated PAC.    Heart rates ranged between 4718 bpm with an average of 60 bpm.    Rare atrial and ventricular ectopy, averaging 1% or less.    The patient had 16 manually activated rhythm recordings.  Symptoms were reported to be \"other\".  The patient's rhythm demonstrated normal sinus rhythm with heart rates ranging between 47 and 80 bpm.  And 9 of the 16 episodes sinus rhythm alone was detected.  In the other 7 episodes the patient had isolated PACs or PVCs.  There was no evidence of atrial fibrillation or flutter.    The patient had 1 auto triggered recordings.  Symptoms were not reported.  The patient's rhythm demonstrated normal sinus rhythm.     Impression:    Essentially normal 48-hour patient activated monitor    Indication for study: Atrial fibrillation.  Although the patient had symptoms there was no indication of any atrial fibrillation or flutter.  The patient's symptomatic recordings had a possible correlation with simple ectopy however the majority of the symptomatic recordings demonstrated no ectopy or other pathologic rhythm disturbance.    No evidence of sustained atrial or ventricular tachyarrhythmia    No profound bradycardia or pauses.    ECHO 6/1/2023 (report reviewed):   1. Normal left ventricular size and systolic performance with a visually estimated ejection fraction of 60%.  2. No significant valvular heart disease is identified on this study.  3. Mild right ventricular enlargement with normal right ventricular systolic performance.  4. There is mild to moderate left atrial enlargement.    MR pulmonary veins 7/18/2014 (report reviewed):   1.  There are 4 pulmonary veins with normal configuration.   2.  Esophagus is adjacent to the posterior side of left atrial body.   3.  Normal left ventricular size and function.  Left ventricular ejection fraction is quantitated at 73%.  No myocardial scar.  4. "  Normal right ventricular size and function.  5.  There is no obvious valvular disease.   6.  Moderately enlarge left atrial size.     Medications  Allergies   Current Outpatient Medications   Medication Sig Dispense Refill    apixaban ANTICOAGULANT (ELIQUIS) 5 mg Tab tablet [APIXABAN ANTICOAGULANT (ELIQUIS) 5 MG TAB TABLET] Take 1 tablet (5 mg total) by mouth every 12 (twelve) hours. 180 tablet 1    levothyroxine (SYNTHROID, LEVOTHROID) 88 MCG tablet Take 88 mcg by mouth At Bedtime      losartan (COZAAR) 25 MG tablet Take 1 tablet (25 mg) by mouth daily 90 tablet 3    metFORMIN (GLUCOPHAGE XR) 500 MG 24 hr tablet Take 1,000 mg by mouth 2 times daily (with meals)      pregabalin (LYRICA) 75 MG capsule Take 75 mg by mouth every morning      tamsulosin (FLOMAX) 0.4 MG capsule Take 0.4 mg by mouth At Bedtime      TRULICITY 1.5 MG/0.5ML pen Inject 1.5 mg Subcutaneous every 7 days Every Sundays      Vitamin D3 (VITAMIN D, CHOLECALCIFEROL,) 25 mcg (1000 units) tablet Take 1 tablet by mouth every morning      Continuous Blood Gluc Sensor (FREESTYLE OLEGARIO 2 SENSOR) MISC 1 each every 14 days Change every 14 days. (Patient not taking: Reported on 6/7/2023)      dexamethasone (DECADRON) 6 MG tablet Take 1 tablet (6 mg) by mouth daily for 5 days 5 tablet 0      Allergies   Allergen Reactions    Ezetimibe GI Disturbance    Simvastatin      Other reaction(s): myalgias, arthralgias        Physical Examination Review of Systems   /64 (BP Location: Left arm, Patient Position: Sitting, Cuff Size: Adult Large)   Pulse 75   Resp 16   Wt (!) 152.9 kg (337 lb)   SpO2 97%   BMI 43.30 kg/m    Body mass index is 43.3 kg/m .  Wt Readings from Last 3 Encounters:   06/07/23 (!) 152.9 kg (337 lb)   05/08/23 (!) 154.1 kg (339 lb 11.7 oz)   05/07/23 (!) 158.8 kg (350 lb)       General Appearance:   Pleasant  male, appears  stated age. no acute distress, obese body habitus   ENT/Mouth: membranes moist, no apparent gingival bleeding.       EYES:  no scleral icterus, normal conjunctivae   Neck: no carotid bruits. No anterior cervical lymphadenopaty   Respiratory:   lungs are clear to auscultation, no rales or wheezing,  equal chest wall expansion    Cardiovascular:   Regular rhythm, normal rate. Normal first and second heart sounds with no murmurs, rubs, or gallops; the carotid, radial and posterior tibial pulses are intact, Jugular venous pressure normal, no edema bilaterally    Abdomen/GI:  no organomegaly, masses, bruits, or tenderness; bowel sounds are present   Extremities: no cyanosis or clubbing   Skin: no xanthelasma, warm.    Heme/lymph/ Immunology No apparent bleeding noted.   Neurologic: Alert and oriented. normal gait, no tremors     Psychiatric: Pleasant, calm, appropriate affect.    A complete 10 system review of systems was performed and is negative except as mentioned in the HPI/subjective.         Past History   Past Medical History:   Past Medical History:   Diagnosis Date    Arrhythmia     a-fib    Atrial fibrillation (H)     Back pain     Back pain     Diabetes mellitus (H)     Hyperlipemia     Kidney stone     Obesity     GASTON (obstructive sleep apnea)     CPAP    PONV (postoperative nausea and vomiting)     postoperatively with oral pain meds    Status post ablation of atrial fibrillation 1/27/2016    PVI Sept 2014 (cryo-PVI + roof line + SAMUEL line)       Past Surgical History:   Past Surgical History:   Procedure Laterality Date    CARDIAC ELECTROPHYSIOLOGY MAPPING AND ABLATION  1/27/16    pvi    CARDIOVERSION  10/31/14    CARDIOVERSION  05/16/2016    COLONOSCOPY N/A 11/18/2015    Procedure: COLONOSCOPY WITH POLYPECTOMY USING BIOPSY FORCEP;  Surgeon: Coco Vallecillo MD;  Location: [facility];  Service:     COMBINED CYSTOSCOPY, INSERT STENT URETER(S) Bilateral 8/20/2015    Procedure: CYSTOSCOPY, BILATERAL STENT REMOVAL, LEFT URETEROSCOPY, STONE EXTRACTION, LEFT STENT INSERTION;  Surgeon: Ansley Jeffery MD;  [facility];  Service:     CYSTOSCOPY W/ LASER LITHOTRIPSY  aug 2015    OTHER SURGICAL HISTORY  9/4/14    pulmonary vein isolation    OTHER SURGICAL HISTORY      eyelid lift    FL ABLATE HEART DYSRHYTHM FOCUS      Description: Catheter Ablation Atrial Fibrillation;  Recorded: 10/08/2014;  Comments: 9/4/14 PVI Cyro to 4 PVs; Roof and SAMUEL lines done.    FL CARDIOVERSION ELECTIVE ARRHYTHMIA EXTERNAL  09/11/2015    Description: Elective Cardioversion External;  Recorded: 03/06/2014;  Comments: 10/11/13; ; 11/27/13 and reverted to afib after 11 days on mod dose sotalol.; ; 1/3/14  sinus on Amio    FL CARDIOVERSION ELECTIVE ARRHYTHMIA EXTERNAL      Description: Elective Cardioversion External;  Recorded: 11/19/2014;  Comments: 10/11/13; ; 11/27/13 and reverted to afib after 11 days on mod dose sotalol.; ; 1/3/14  sinus on Amio.  10/31/14    FL CYSTO/URETERO W/LITHOTRIPSY &INDWELL STENT INSRT Right 7/16/2019    Procedure: CYSTOSCOPY RIGHT URETEROSCOPY, LASER  LITHOTRIPSY STENT INSERTION;  Surgeon: Kingston Kurtz MD;  Location: [facility] Main OR;  Service: Urology    REMOVAL OF SPERM DUCT(S)      Description: Surgery Of Male Genitalia Vasectomy;  Recorded: 10/29/2013;    TONSILLECTOMY      VASECTOMY         Family History:   Family History   Problem Relation Age of Onset    Cancer Mother         uterine    Diabetes Maternal Aunt     Urolithiasis No family hx of     Clotting Disorder No family hx of     Gout No family hx of     Heart Disease No family hx of     Anesthesia Reaction No family hx of        Social History:   Social History     Socioeconomic History    Marital status:      Spouse name: Not on file    Number of children: Not on file    Years of education: Not on file    Highest education level: Not on file   Occupational History    Not on file   Tobacco Use    Smoking status: Never    Smokeless tobacco: Never   Vaping Use    Vaping status: Not on file   Substance and Sexual Activity    Alcohol  use: No     Alcohol/week: 1.0 standard drink of alcohol    Drug use: No    Sexual activity: Not on file   Other Topics Concern    Not on file   Social History Narrative    Not on file     Social Determinants of Health     Financial Resource Strain: Not on file   Food Insecurity: Not on file   Transportation Needs: Not on file   Physical Activity: Not on file   Stress: Not on file   Social Connections: Not on file   Intimate Partner Violence: Not on file   Housing Stability: Not on file              Lab Results    Chemistry/lipid CBC Cardiac Enzymes/BNP/TSH/INR   Lab Results   Component Value Date    CHOL 286 (H) 03/01/2023    HDL 43 03/01/2023     (H) 03/01/2023    TRIG 187 (H) 03/01/2023    CR 1.22 05/07/2023    BUN 13 05/07/2023    POTASSIUM 4.2 05/07/2023     05/07/2023    CO2 25 05/07/2023      Lab Results   Component Value Date    WBC 9.1 05/07/2023    HGB 14.7 05/07/2023    HCT 44.4 05/07/2023    MCV 89 05/07/2023     05/07/2023    A1C 6.5 (H) 05/08/2023     Lab Results   Component Value Date    A1C 6.5 (H) 05/08/2023    Lab Results   Component Value Date     (H) 05/07/2023    TSH 2.03 05/07/2023    INR 1.06 05/08/2023          Homer العراقي MD [facility]  Non-Invasive Cardiologist  [facility]  Pager [phone]        Thank you for allowing me to participate in the care of your patient.      Sincerely,     Homer العراقي MD     [facility]  cc:   No referring provider defined for this encounter.

## 2023-06-08 LAB
BSA FOR ECHO PROCEDURE: 2.71 M2
CCTA ASCENDING AORTA: 3.4
CCTA SINUS: 3.9

## 2023-06-12 ENCOUNTER — TELEPHONE (OUTPATIENT)
Dept: CARDIOLOGY | Facility: CLINIC | Age: 68
End: 2023-06-12
Payer: COMMERCIAL

## 2023-06-12 DIAGNOSIS — I48.19 PERSISTENT ATRIAL FIBRILLATION (H): Primary | ICD-10-CM

## 2023-06-12 NOTE — TELEPHONE ENCOUNTER
MELODY left for writer by patient wondering what next steps are for LAAC scheduling. Informed him we are currently waiting on Cardiology read of CT scan, but once completed we can give him a call to schedule LAAC. He is appreciative, no further questions at this time. JONATHAN

## 2023-06-13 ENCOUNTER — TELEPHONE (OUTPATIENT)
Dept: CARDIOLOGY | Facility: CLINIC | Age: 68
End: 2023-06-13
Payer: COMMERCIAL

## 2023-06-13 DIAGNOSIS — I25.10 CORONARY ARTERY DISEASE INVOLVING NATIVE CORONARY ARTERY OF NATIVE HEART WITHOUT ANGINA PECTORIS: Primary | ICD-10-CM

## 2023-06-13 DIAGNOSIS — Z78.9 STATIN INTOLERANCE: ICD-10-CM

## 2023-06-13 NOTE — TELEPHONE ENCOUNTER
PA Initiation    Medication:  Repatha 140mg/ml  Insurance Company:  [employer] [facility]  [phone]  ID#: [id number]  BIN:  [id number]  PCN:  [id number]  Pharmacy Filling the Rx:  [facility]  Filling Pharmacy Phone: [phone]    Filling Pharmacy Fax:  [phone]  Start Date:  6-13-23    CoverMyMeds  Key:  [id number]

## 2023-06-13 NOTE — TELEPHONE ENCOUNTER
Central Prior Authorization Team   Phone: [phone]    PA Initiation    Medication: Repatha SureClick 140MG/ML auto-injectors    Insurance Company: [employer] - Phone [phone] Fax [phone]  Pharmacy Filling the Rx: [id number] - [place] [facility] [address]  Filling Pharmacy Phone: [phone]  Filling Pharmacy Fax:    Start Date: 6/13/2023

## 2023-06-13 NOTE — TELEPHONE ENCOUNTER
with direct number left for call back. -SC    ----- Message -----  From: Devang Zuñiga MD  Sent: 6/12/2023  11:47 AM CDT  To: Dejah Hurtado RN; Vincent Klein MD; *    [id number]. Okay to proceed w/ LAAO attempt.    Thx!  Devang  ----- Message -----  From: Dejah Hurtado RN  Sent: 6/9/2023   3:16 PM CDT  To: Devang Zuñiga MD; Vincent Klein MD; *    Please see attached for Pre-LAAC CT.     OK to schedule for LAAC Procedure?   Thanks,   Dejah

## 2023-06-14 NOTE — TELEPHONE ENCOUNTER
Test claim 28 days supply  $146.40 copay     Routing to liaison team for possible financial assistance.

## 2023-06-14 NOTE — TELEPHONE ENCOUNTER
Prior Authorization Approval    Authorization Effective Date: 3/15/2023  Authorization Expiration Date: 6/13/2024  Medication: Repatha SureClick 140MG/ML auto-injectors    Approved Dose/Quantity:   Reference #:     Insurance Company: [employer] - Phone [phone] Fax [phone]  Expected CoPay:       CoPay Card Available:      Foundation Assistance Needed:    Which Pharmacy is filling the prescription (Not needed for infusion/clinic administered): [id number] - [place] [facility] [address]  Pharmacy Notified: Yes  Patient Notified: Yes

## 2023-06-14 NOTE — TELEPHONE ENCOUNTER
Incoming call from patient to discuss results of pre-LAAC CT. Informed patient of suitable anatomy for LAAC procedure. Discussed pre and post procedure expectations, including appts the week of the procedure, need for responsible adult at home the night of the procedure, driving restrictions post-procedure, 6 week post-LAAC appt, and 3 month post-LAAC AMANDEEP. Patient has been informed that if they are unable to find responsible adult and need overnight stay at hospital, there is a chance that their case will be cancelled day of depending on bed availability. Patient agreeable to plan moving forward. Would like to have LAAC procedure 8/31/23 with Dr. Zuñiga. Informed patient scheduling will be in touch to finalize procedure appointments but that they may return call to writer at their leisure. He is VERY interested in last minute cancellations/openings and would like to be called as soon as an opening comes up with any provider. Patient has writer's direct office number for further questions or concerns. No further questions or concerns at this time.

## 2023-06-15 NOTE — TELEPHONE ENCOUNTER
Msg rec'd 6-15-23 @ 1425:  Silvia Baeza Molly; Peggy Hays, RN  Hello!     I've provided the patient free drug enrollment information/instructions via [email or web address].     Thank you,     Silvia Baeza [facility]   Specialty Pharmacy Clinic Liaison

## 2023-06-18 ENCOUNTER — HEALTH MAINTENANCE LETTER (OUTPATIENT)
Age: 68
End: 2023-06-18

## 2023-06-20 ENCOUNTER — OFFICE VISIT (OUTPATIENT)
Dept: PHARMACY | Facility: PHYSICIAN GROUP | Age: 68
End: 2023-06-20
Payer: COMMERCIAL

## 2023-06-20 VITALS
HEART RATE: 50 BPM | SYSTOLIC BLOOD PRESSURE: 110 MMHG | BODY MASS INDEX: 43.68 KG/M2 | OXYGEN SATURATION: 92 % | WEIGHT: 315 LBS | DIASTOLIC BLOOD PRESSURE: 70 MMHG

## 2023-06-20 DIAGNOSIS — I25.10 CORONARY ARTERY DISEASE INVOLVING NATIVE CORONARY ARTERY OF NATIVE HEART WITHOUT ANGINA PECTORIS: ICD-10-CM

## 2023-06-20 DIAGNOSIS — E66.01 MORBID OBESITY (H): ICD-10-CM

## 2023-06-20 DIAGNOSIS — I48.0 PAROXYSMAL ATRIAL FIBRILLATION (H): Primary | ICD-10-CM

## 2023-06-20 DIAGNOSIS — E78.5 HYPERLIPIDEMIA LDL GOAL <100: ICD-10-CM

## 2023-06-20 DIAGNOSIS — N40.1 BENIGN PROSTATIC HYPERPLASIA WITH INCOMPLETE BLADDER EMPTYING: ICD-10-CM

## 2023-06-20 DIAGNOSIS — R39.14 BENIGN PROSTATIC HYPERPLASIA WITH INCOMPLETE BLADDER EMPTYING: ICD-10-CM

## 2023-06-20 DIAGNOSIS — E11.9 TYPE 2 DIABETES MELLITUS WITHOUT COMPLICATION, WITHOUT LONG-TERM CURRENT USE OF INSULIN (H): ICD-10-CM

## 2023-06-20 PROCEDURE — 99607 MTMS BY PHARM ADDL 15 MIN: CPT | Performed by: PHARMACIST

## 2023-06-20 PROCEDURE — 99606 MTMS BY PHARM EST 15 MIN: CPT | Performed by: PHARMACIST

## 2023-06-20 NOTE — PROGRESS NOTES
Medication Therapy Management (MTM) Encounter    ASSESSMENT:                              Medication Adherence/Access: [employer] [facility] not excepting new applicants for Trulicity at this time due to medication shortage.   [employer] [facility] excepting new applications for Ozempic, patient agreeable to this.  Will bring in taxes as believes he meets income threshold less than $74,000 annually.  Patient given free 30-day coupon for Eliquis to help with affordability prior to Watchman procedure.    Afib/CAD: Blood pressure at goal less than 130/80 mmHg.  Patient does not have strong indication for ARB other than cardiac remodeling (no microalbuminuria, no MI history).  May consider reducing or stopping after Watchman procedure.  Discussed benefit of rosuvastatin being more hydrophilic statin and able to take every other day due to long half-life.  This may be beneficial to patient and more affordable than Repatha and more convenient with significant benefit for preventing plaque buildup, and benefit with comorbid diabetes. -Patient agreeable if okay with cardiology.  Patient given    Type 2 Diabetes: A1c not at goal less than 7%.  Metformin optimized at 2 g daily for macrovascular benefit.  Patient would benefit from optimizing GLP-1 for weight management and blood sugar control.  Tolerating Trulicity well, recommend switching to Ozempic 0.5 mg weekly as slightly more potent (see above). discussed benefit of statin  with comorbid diabetes today (see above).  Would recommend aspirin after stopping Eliquis.  No microalbuminuria, ACE inhibitor not indicated.    BPH: Educated on retrograde ejaculation and low risk for harm.  Patient offered to switch to alfuzosin as less risk of retrograde ejaculation.  Patient declined today as would like to use up tamsulosin and monitor symptoms as urinary symptoms have significantly improved -just refilled 90-day supply.     PLAN:                            1.  Start Ozempic patient assistance  program application.  Bring taxes to clinic to submit for financial proof of income.  2. Medication Therapy Management pharmacist to connect with cardiology to discuss starting rosuvastatin instead of Repatha.  3.  We will consider alfuzosin to replace tamsulosin in the future after 90-day supply exhausted.      Follow-up:   Future Appointments 6/20/2023 - 12/17/2023      Date Visit Type Length Department Provider     7/19/2023  9:00 AM RETURN - MTM 30 min ETR [facility] MTM EXT Nilsa Mata RPH                         SUBJECTIVE/OBJECTIVE:                          Khari Jamison is a 67 year old male coming in for a follow-up visit. Patient was accompanied by wife, Kenyetta. Today's visit is a follow-up MTM visit from 4/18/23     Reason for visit: Medication Therapy Management .    Allergies/ADRs: Reviewed in chart  Past Medical History: Reviewed in chart  Tobacco: He reports that he has never smoked. He has never used smokeless tobacco.  Alcohol: Less than 1 beverages / week  Caffeine: Not currently using        Medication Adherence/Access: Patient able to pay $47 for Trulicity short-term as he feels this is important to his wellbeing in managing diabetes. Now hit donut hole and over $300/month for Trulicity, $500 for Eliquis, Repatha over $100. Would like to apply for patient assistance program longitudinally to help with affordability, [facility] not accepting new applications today.        Afib/CAD:   Eliquis 5mg twice daily for anticoagulation.   Losartan 25 mg once daily -started by cardiology prior to Watchman procedure  Repatha 140 mg every 14 days - has not started, working on Patient Assistance with Cardiology.    Patient reports no current concerns of bruising or bleeding. Patient does not have a history of GI bleed. Patient reports increased dizziness after recent COVID hospitalization.  Does not believe related to meds.  Working to improve energy and stamina which is significantly lacking after COVID.  Working  with Dr. العراقي in Cardiology to have Watchman procedure to stop need for Eliquis in August. Has history of myalgias, was on Crestor in the past but stopped due to concerns for myalgias per chart, no actual documentation of muscle pain/weakness. Has documented myalgias with others such as simvastatin.    Patient does not self-monitor blood pressure.    Recent Labs   Lab Test 03/01/23  1319 01/04/22  0940   CHOL 286* 203*   HDL 43 39*   * 143*   TRIG 187* 105     BP Readings from Last 3 Encounters:   06/20/23 110/70   06/07/23 138/64   05/08/23 (!) 148/56         Type 2 Diabetes:   Metformin 500 mg ER tablets -2 tablets twice daily  Trulicity 1.5 mg - once weekly on Sundays (has about 3 weeks left of current box)    Diarrhea improved taking metformin with food.  Denies side effects with Trulicity.   Blood sugar monitoring: CGM    Continuous Glucose Monitoring Results:   Average Glucose Last 14 Days = 111 mg/dl    Time in Target Last 14 Days:  Above 180 mg/dL: 0%  In target  mg/dL: 100%  Below 70 mg/dL: 0%    Symptoms of low blood sugar? none  Symptoms of high blood sugar? none  Eye exam: up to date -February 2023  Foot exam: up to date -September 2022  Diet/Exercise: Patient works to limit carbohydrates/sweets and eat healthfully.  Goal is to manage blood sugar and weight  Aspirin: Not taking due to prefers not to take - also on Eliquis  Statin: No -side effects to simvastatin and Zetia  ACEi/ARB: yes.   Microalbumin (3/21/2023): Normal  A1C (3/1/2023): 7.2%          BPH:   Tamsulosin 0.4 mg capsule once daily around dinnertime    Patient notes improvement in urinary symptoms with starting tamsulosin.  Does note occasional retrograde ejaculation -denies this being painful or concerning as no goal for pregnancy.  Patient symptoms include frequent urination, difficulty emptying bladder and interrupted void stream -all improved.  Just refilled 90-day supply.    Today's Vitals: /70   Pulse 50   Wt (!)  340 lb (154.2 kg)   SpO2 92%   BMI 43.68 kg/m    ----------------      I spent 30 minutes with this patient today. All changes were made via collaborative practice agreement with Shi Sneed MD. A copy of the visit note was provided to the patient's provider(s).    A summary of these recommendations was given to the patient.    Nilsa Mata, Pharm D., MPH  MTM Pharmacist - [facility]  Secure Voicemail: [phone]  In Office: Monday - Thursday         Medication Therapy Recommendations  Atrial fibrillation (H)    Current Medication: apixaban ANTICOAGULANT (ELIQUIS) 5 mg Tab tablet   Rationale: Cannot afford medication product - Cost - Adherence   Recommendation: Referral to Service  - Give patient 30-day free Eliquis coupon   Status: Patient Agreed - Adherence/Education         Hyperlipidemia LDL goal <100    Current Medication: evolocumab (REPATHA) 140 MG/ML prefilled autoinjector   Rationale: Untreated condition - Needs additional medication therapy - Indication   Recommendation: Start Medication - Start rosuvastatin instead of Repatha   Status: Contact Provider - Awaiting Response         Type 2 diabetes mellitus without complication, without long-term current use of insulin (H)    Current Medication: TRULICITY 1.5 MG/0.5ML pen   Rationale: Cannot afford medication product - Cost - Adherence   Recommendation: Change Medication - Change to Ozempic and enroll in patient assistance   Status: Accepted - no CPA Needed

## 2023-06-20 NOTE — Clinical Note
Anna العراقي, I saw patient today with primary care team.  We discussed rosuvastatin instead of Repatha for convenience and affordability.  He cannot confirm that he had tried rosuvastatin in the past and we discussed that due to hydrophilic statin, may have less side effect and can take every other day.  He wanted me to bounce this off you before we started rosuvastatin instead of Repatha.  What are your thoughts on this? Thanks for your input! Nilsa Mata, Pharm D., MPH MTM Pharmacist - [facility] Secure Voicemail: [phone] In Office: Monday - Thursday

## 2023-06-20 NOTE — PATIENT INSTRUCTIONS
"Recommendations from MTM Pharmacist visit:                                                    MTM (medication therapy management) is a service provided by a clinical pharmacist designed to help you get the most of out of your medicines.  You may be sent a phone or email survey evaluating today's visit.  Please provide feedback you have for the service he received today if you are able.      1.  Start Ozempic patient assistance program application.  Bring taxes to clinic to submit for financial proof of income.  2. Medication Therapy Management pharmacist to connect with cardiology to discuss starting rosuvastatin instead of Repatha.  3.  We will consider alfuzosin to replace tamsulosin in the future after 90-day supply exhausted.      Follow-up:   Future Appointments 6/20/2023 - 12/17/2023        Date Visit Type Length Department Provider     7/19/2023  9:00 AM RETURN - MTM 30 min ETR [facility] MTM EXT Nilsa Mata, [facility]                       It was great speaking with you today.  I value your experience and would be very thankful for your time in providing feedback in our clinic survey. In the next few days, you may receive an email or text message from [employer] with a link to a survey related to your  clinical pharmacist.\"     To schedule another MTM appointment, please call the clinic directly or you may call the MTM scheduling line at [phone] or toll-free at [phone].     My Clinical Pharmacist's contact information:                                                      Please feel free to contact me with any questions or concerns you have.      Nilsa Mata, Pharm D., MPH  MTM Pharmacist - [facility]  Secure Voicemail: [phone]  Days in the office: Monday - Thursday          "

## 2023-07-07 ENCOUNTER — TELEPHONE (OUTPATIENT)
Dept: CARDIOLOGY | Facility: CLINIC | Age: 68
End: 2023-07-07
Payer: COMMERCIAL

## 2023-07-07 NOTE — LETTER
To the office of Dr. Dave Park and Tiffanie MATTSON,      Tyrone Jamison is scheduled for Left Atrial Appendage Closure with our team on Thursday July 27th with Dr. Judy Tapia at [facility]. He will arrive for his procedure at 5:30 am.    In preparation for his procedure, he will have a visit with our office ([facility]) for labs, EKG, and education with one of our Structural Nurses on Monday July 24th.     We will instruct the patient to have NOTHING to eat after 9:30 pm on Wednesday July 26th. The patient will be allowed to have clear liquids between the hours of 9:30 pm Wednesday July 26th and 3:30 am on Thursday July 27th. After 3:30 am on the morning of the procedure, he will not be allowed to have anything to eat or drink until after his procedure.     The morning of his procedure, prior to 3:30 am, he will take the following medications with a small sip of water (if not listed, DO NOT TAKE): Synthroid, Cozaar, Eliquis, and Lyrica. HE SHOULD NOT TAKE METFORMIN THE MORNING OF HIS PROCEDURE.     Please do not hesitate to reach out to our office for any further questions or concerns. Otherwise, patient will receive the rest of his instructions/logistics for the procedure when he sees us on July 24th. Thanks!    Sobia LAUREANO RN  Structural Heart Nurse Coordinator  Office Hours: [facility] 8am-4:30pm  Direct Line: [phone]

## 2023-07-07 NOTE — TELEPHONE ENCOUNTER
Phone call to patient to see if interested in sooner date for LAAC procedure. He would like to have LAAC procedure 7/27/2023. Will route to scheduling to change patient LAAC procedure date. He will need H&P with PCP office, but will have NON-FASTING labs, EKG, and RN only visit on 7/24/2023. He will call their office today. Instructed patient he will have medication instructions from writer for LAAC procedure. He will call as soon as scheduled. Will route to scheduling to move case and arrange for labs/RN visit 7/24/2023. JONATHAN

## 2023-07-10 NOTE — TELEPHONE ENCOUNTER
----- Message -----  From: Maia Ohara  Sent: 7/10/2023   9:43 AM CDT  To: Sobia Montes RN    All set    Pt pre op with primary set on 7/18    Lab/ekg and nurse visit on 7/24 at 10am    Procedure on snap and intra op scheduled for 7/27    And 6 week post scheduled but scheduled earlier due to patient leaving [facility] last week in Aug. So scheduled on Thursday 8/24     -Sidney  ----- Message -----  From: Sobia Montes RN  Sent: 7/7/2023   2:51 PM CDT  To: [facility] Procedure  Pool - [facility]    ----- Message from Sobia Montes RN sent at 7/7/2023  2:51 PM CDT -----  Please call patient to schedule labs/RN only visit on 7/24 (around 10 am if possible). Patient to have H&P with PCP  Please schedule 6 week post-LAAC appt with EP IRWIN and LAAC procedure/add to snapboard 7/27/2023 with Dr. Tapia  Patient DOES NOT have a device. SDM done  Case request, intra-Op AMANDEEP, and 3 month post-LAAC AMANDEEP orders in.   Thanks!     Sobia LAUREANO RN

## 2023-07-18 ENCOUNTER — TRANSFERRED RECORDS (OUTPATIENT)
Dept: HEALTH INFORMATION MANAGEMENT | Facility: CLINIC | Age: 68
End: 2023-07-18
Payer: COMMERCIAL

## 2023-07-18 ENCOUNTER — LAB REQUISITION (OUTPATIENT)
Dept: LAB | Facility: CLINIC | Age: 68
End: 2023-07-18

## 2023-07-18 DIAGNOSIS — E03.9 HYPOTHYROIDISM, UNSPECIFIED: ICD-10-CM

## 2023-07-18 DIAGNOSIS — Z01.818 ENCOUNTER FOR OTHER PREPROCEDURAL EXAMINATION: ICD-10-CM

## 2023-07-18 LAB
ANION GAP SERPL CALCULATED.3IONS-SCNC: 13 MMOL/L (ref 7–15)
BUN SERPL-MCNC: 12.8 MG/DL (ref 8–23)
CALCIUM SERPL-MCNC: 9.5 MG/DL (ref 8.8–10.2)
CHLORIDE SERPL-SCNC: 107 MMOL/L (ref 98–107)
CREAT SERPL-MCNC: 1.04 MG/DL (ref 0.67–1.17)
DEPRECATED HCO3 PLAS-SCNC: 25 MMOL/L (ref 22–29)
GFR SERPL CREATININE-BSD FRML MDRD: 78 ML/MIN/1.73M2
GLUCOSE SERPL-MCNC: 192 MG/DL (ref 70–99)
HBA1C MFR BLD: 6.1 % (ref 4.2–6.1)
POTASSIUM SERPL-SCNC: 4.1 MMOL/L (ref 3.4–5.3)
SODIUM SERPL-SCNC: 145 MMOL/L (ref 136–145)
TSH SERPL DL<=0.005 MIU/L-ACNC: 1.97 UIU/ML (ref 0.3–4.2)

## 2023-07-18 PROCEDURE — 80048 BASIC METABOLIC PNL TOTAL CA: CPT | Performed by: FAMILY MEDICINE

## 2023-07-18 PROCEDURE — 84443 ASSAY THYROID STIM HORMONE: CPT | Performed by: FAMILY MEDICINE

## 2023-07-19 ENCOUNTER — OFFICE VISIT (OUTPATIENT)
Dept: PHARMACY | Facility: PHYSICIAN GROUP | Age: 68
End: 2023-07-19
Payer: COMMERCIAL

## 2023-07-19 VITALS
DIASTOLIC BLOOD PRESSURE: 72 MMHG | SYSTOLIC BLOOD PRESSURE: 100 MMHG | HEART RATE: 61 BPM | WEIGHT: 315 LBS | BODY MASS INDEX: 42.89 KG/M2 | OXYGEN SATURATION: 96 %

## 2023-07-19 DIAGNOSIS — E11.9 TYPE 2 DIABETES MELLITUS WITHOUT COMPLICATION, WITHOUT LONG-TERM CURRENT USE OF INSULIN (H): Primary | ICD-10-CM

## 2023-07-19 DIAGNOSIS — E66.01 MORBID OBESITY (H): ICD-10-CM

## 2023-07-19 DIAGNOSIS — I48.0 PAROXYSMAL ATRIAL FIBRILLATION (H): ICD-10-CM

## 2023-07-19 DIAGNOSIS — I25.10 CORONARY ARTERY DISEASE INVOLVING NATIVE CORONARY ARTERY OF NATIVE HEART WITHOUT ANGINA PECTORIS: ICD-10-CM

## 2023-07-19 DIAGNOSIS — N40.1 BENIGN PROSTATIC HYPERPLASIA WITH INCOMPLETE BLADDER EMPTYING: ICD-10-CM

## 2023-07-19 DIAGNOSIS — R39.14 BENIGN PROSTATIC HYPERPLASIA WITH INCOMPLETE BLADDER EMPTYING: ICD-10-CM

## 2023-07-19 PROCEDURE — 99607 MTMS BY PHARM ADDL 15 MIN: CPT | Performed by: PHARMACIST

## 2023-07-19 PROCEDURE — 99606 MTMS BY PHARM EST 15 MIN: CPT | Performed by: PHARMACIST

## 2023-07-19 NOTE — PROGRESS NOTES
Medication Therapy Management (MTM) Encounter    ASSESSMENT:                              Medication Adherence/Access: Called [employer] to get free 30 day voucher as patient out of GLP1 and was approved for [employer], shipment delay causing lapse in treatment.  -----------------------------------------    Afib/CAD: Blood pressure at goal less than 130/80 mmHg. Blood pressure low, but no symptoms of hypotension other than fatigue which believe to be secondary to Covid. Patient does not have strong indication for ARB other than cardiac remodeling (no microalbuminuria, no MI history).  May consider reducing or stopping after Watchman procedure.  Tolerating rosuvastatin well. Will recheck fasting lipids in about 4 weeks.  -----------------------------------------  Type 2 Diabetes: A1c at goal less than 7%.  Metformin optimized at 2 g daily for macrovascular benefit.  Patient would benefit from optimizing GLP-1 for weight management and blood sugar control.  Tolerating Trulicity well, recommend switching to Ozempic 0.5 mg weekly as slightly more potent (see above Re [employer]).  Would recommend aspirin after stopping Eliquis.  Taking statin and ARB. Recommended to hold GLP1 1 week prior to Watchman. Diarrhea may be related to GLP1 and metformin - recommend bulk fiber to help form stools.  -----------------------------------------  BPH: Educated on retrograde ejaculation and low risk for harm.  Patient offered to switch to alfuzosin as less risk of retrograde ejaculation.  Patient declined today as would like to use up tamsulosin and monitor symptoms as urinary symptoms have significantly improved - would like things stable until after Watchman procedure.    PLAN:                            1.  Start Ozempic 0.5 mg weekly - Voucher sent to [facility] for 30 day supply. Start after Watchman procedure.  2. Start metamucil 1 tsp daily in 8oz liquid.  3.  We will consider alfuzosin to replace tamsulosin in the future after 90-day  supply exhausted.      Follow-up: 8/14 with Nilsa Mata, Medication Therapy Management Pharmacist at [facility].    SUBJECTIVE/OBJECTIVE:                          Khari Jamison is a 67 year old male coming in for a follow-up visit. Patient was accompanied by wife, Kenyetta. Today's visit is a follow-up [facility] visit from 6/20/23     Reason for visit: Medication Therapy Management .    Allergies/ADRs: Reviewed in chart  Past Medical History: Reviewed in chart  Tobacco: He reports that he has never smoked. He has never used smokeless tobacco.  Alcohol: Less than 1 beverages / week  Caffeine: Not currently using    -----------------------------------------    Medication Adherence/Access: Patient able to pay $47 for Trulicity short-term as he feels this is important to his wellbeing in managing diabetes. Now hit donut lj and over $300/month for Trulicity -previously applied for [employer]IG[employer] and approved.  It was his understanding that Ozempic would be here in clinic today.  It is not here.  Patient has been out of Trulicity for 1 week. $500 for Eliquis -stopping soon (see below)    -----------------------------------------    Afib/CAD:   Eliquis 5mg twice daily for anticoagulation - plan to stop after Watchman  Losartan 25 mg once daily  Rosuvastatin 5 mg tablet every other day for weeks x 4 weeks    Patient reports no current concerns of bruising or bleeding. Patient does not have a history of GI bleed.  Working to improve energy and stamina which is significantly lacking after COVID.  Working with Dr. العراقي in Cardiology to have Watchman procedure to stop need for Eliquis next week.  No myalgias since starting rosuvastatin.    Patient does not self-monitor blood pressure.    Lab Test 03/01/23  1319   CHOL 286*   HDL 43   *   TRIG 187*     BP Readings from Last 3 Encounters:   07/24/23 108/58   07/19/23 100/72   06/20/23 110/70       -----------------------------------------    Type 2 Diabetes:    Metformin 500 mg ER tablets -2 tablets twice daily  Trulicity 1.5 mg - once weekly on Sundays -out for 1 week    Noticed diarrhea on and off chronically.  Not sure if related to Trulicity. Feels somewhat improved being without Trulicity for a week.  Blood sugar monitoring: CGM    Continuous Glucose Monitoring Results:   Average Glucose Last 14 Days = 116 mg/dl    Time in Target Last 14 Days:  Above 180 mg/dL: 2%  In target  mg/dL: 96%  Below 70 mg/dL: 2%    Symptoms of low blood sugar? none  Symptoms of high blood sugar? none  Eye exam: up to date -February 2023  Foot exam: up to date -September 2022  Diet/Exercise: Patient works to limit carbohydrates/sweets and eat healthfully.  Goal is to manage blood sugar and weight.  Aspirin: Not taking due to prefers not to take - also on Eliquis  Statin: Rosuvastatin  ACEi/ARB: losartan.   Microalbumin (3/21/2023): Normal  A1C (7/18/2023): 6.1%            -----------------------------------------    BPH:   Tamsulosin 0.4 mg capsule once daily 30 min after dinner    Patient notes improvement in urinary symptoms with starting tamsulosin.  Does note occasional retrograde ejaculation -denies this being painful or concerning as no goal for pregnancy.  Patient symptoms include frequent urination, difficulty emptying bladder and interrupted void stream -all improved.      Today's Vitals: /72   Pulse 61   Wt (!) 333 lb 12.8 oz (151.4 kg)   SpO2 96%   BMI 42.89 kg/m    ----------------      I spent 30 minutes with this patient today. All changes were made via collaborative practice agreement with Shi Sneed MD. A copy of the visit note was provided to the patient's provider(s).    A summary of these recommendations was given to the patient.    Nilsa Mata, Pharm D., MPH  MTM Pharmacist - [facility]  Secure Voicemail: [phone]  In Office: Monday - Thursday         Medication Therapy Recommendations  Hyperlipidemia LDL goal <100    Current Medication:  evolocumab (REPATHA) 140 MG/ML prefilled autoinjector (Discontinued)   Rationale: Untreated condition - Needs additional medication therapy - Indication   Recommendation: Start Medication - Start rosuvastatin instead of Repatha   Status: Accepted per Provider         Type 2 diabetes mellitus without complication, without long-term current use of insulin (H)    Current Medication: semaglutide (OZEMPIC, 0.25 OR 0.5 MG/DOSE,) 2 MG/3ML pen   Rationale: Cannot afford medication product - Cost - Adherence   Recommendation: Referral to Service  - help pt get Ozempic through [employer]   Status: Accepted per CPA          Current Medication: semaglutide (OZEMPIC, 0.25 OR 0.5 MG/DOSE,) 2 MG/3ML pen   Rationale: Undesirable effect - Adverse medication event - Safety   Recommendation: Provide Education - start metamucil to help form stool related to GLP1   Status: Patient Agreed - Adherence/Education

## 2023-07-21 DIAGNOSIS — I48.19 PERSISTENT ATRIAL FIBRILLATION (H): Primary | ICD-10-CM

## 2023-07-23 LAB
ABO/RH(D): NORMAL
ANTIBODY SCREEN: NEGATIVE
SPECIMEN EXPIRATION DATE: NORMAL

## 2023-07-24 ENCOUNTER — DOCUMENTATION ONLY (OUTPATIENT)
Dept: CARDIOLOGY | Facility: CLINIC | Age: 68
End: 2023-07-24

## 2023-07-24 ENCOUNTER — PREP FOR PROCEDURE (OUTPATIENT)
Dept: CARDIOLOGY | Facility: CLINIC | Age: 68
End: 2023-07-24

## 2023-07-24 ENCOUNTER — LAB (OUTPATIENT)
Dept: CARDIOLOGY | Facility: CLINIC | Age: 68
End: 2023-07-24
Payer: COMMERCIAL

## 2023-07-24 ENCOUNTER — ALLIED HEALTH/NURSE VISIT (OUTPATIENT)
Dept: CARDIOLOGY | Facility: CLINIC | Age: 68
End: 2023-07-24
Payer: COMMERCIAL

## 2023-07-24 VITALS
HEART RATE: 85 BPM | RESPIRATION RATE: 16 BRPM | WEIGHT: 315 LBS | BODY MASS INDEX: 43.55 KG/M2 | DIASTOLIC BLOOD PRESSURE: 58 MMHG | SYSTOLIC BLOOD PRESSURE: 108 MMHG

## 2023-07-24 DIAGNOSIS — I48.19 PERSISTENT ATRIAL FIBRILLATION (H): ICD-10-CM

## 2023-07-24 DIAGNOSIS — I48.19 PERSISTENT ATRIAL FIBRILLATION (H): Primary | ICD-10-CM

## 2023-07-24 LAB
ANION GAP SERPL CALCULATED.3IONS-SCNC: 11 MMOL/L (ref 7–15)
ATRIAL RATE - MUSE: 85 BPM
BUN SERPL-MCNC: 13.7 MG/DL (ref 8–23)
CALCIUM SERPL-MCNC: 9.6 MG/DL (ref 8.8–10.2)
CHLORIDE SERPL-SCNC: 103 MMOL/L (ref 98–107)
CREAT SERPL-MCNC: 1.08 MG/DL (ref 0.67–1.17)
DEPRECATED HCO3 PLAS-SCNC: 27 MMOL/L (ref 22–29)
DIASTOLIC BLOOD PRESSURE - MUSE: NORMAL MMHG
ERYTHROCYTE [DISTWIDTH] IN BLOOD BY AUTOMATED COUNT: 12.9 % (ref 10–15)
GFR SERPL CREATININE-BSD FRML MDRD: 75 ML/MIN/1.73M2
GLUCOSE SERPL-MCNC: 160 MG/DL (ref 70–99)
HCT VFR BLD AUTO: 45.6 % (ref 40–53)
HGB BLD-MCNC: 15.2 G/DL (ref 13.3–17.7)
INTERPRETATION ECG - MUSE: NORMAL
MCH RBC QN AUTO: 29.9 PG (ref 26.5–33)
MCHC RBC AUTO-ENTMCNC: 33.3 G/DL (ref 31.5–36.5)
MCV RBC AUTO: 90 FL (ref 78–100)
P AXIS - MUSE: 70 DEGREES
PLATELET # BLD AUTO: 198 10E3/UL (ref 150–450)
POTASSIUM SERPL-SCNC: 4.6 MMOL/L (ref 3.4–5.3)
PR INTERVAL - MUSE: 148 MS
QRS DURATION - MUSE: 92 MS
QT - MUSE: 378 MS
QTC - MUSE: 449 MS
R AXIS - MUSE: 11 DEGREES
RBC # BLD AUTO: 5.08 10E6/UL (ref 4.4–5.9)
SODIUM SERPL-SCNC: 141 MMOL/L (ref 136–145)
SYSTOLIC BLOOD PRESSURE - MUSE: NORMAL MMHG
T AXIS - MUSE: 21 DEGREES
VENTRICULAR RATE- MUSE: 85 BPM
WBC # BLD AUTO: 8 10E3/UL (ref 4–11)

## 2023-07-24 PROCEDURE — 86901 BLOOD TYPING SEROLOGIC RH(D): CPT

## 2023-07-24 PROCEDURE — 86850 RBC ANTIBODY SCREEN: CPT

## 2023-07-24 PROCEDURE — 99207 PR NO CHARGE NURSE ONLY: CPT

## 2023-07-24 PROCEDURE — 85027 COMPLETE CBC AUTOMATED: CPT

## 2023-07-24 PROCEDURE — 36415 COLL VENOUS BLD VENIPUNCTURE: CPT

## 2023-07-24 PROCEDURE — 86900 BLOOD TYPING SEROLOGIC ABO: CPT

## 2023-07-24 PROCEDURE — 93000 ELECTROCARDIOGRAM COMPLETE: CPT | Performed by: INTERNAL MEDICINE

## 2023-07-24 PROCEDURE — 80048 BASIC METABOLIC PNL TOTAL CA: CPT

## 2023-07-24 RX ORDER — SODIUM CHLORIDE 9 MG/ML
1000 INJECTION, SOLUTION INTRAVENOUS CONTINUOUS
Status: CANCELLED | OUTPATIENT
Start: 2023-07-27

## 2023-07-24 RX ORDER — ASPIRIN 81 MG/1
81 TABLET ORAL ONCE
Status: CANCELLED | OUTPATIENT
Start: 2023-07-24 | End: 2023-07-24

## 2023-07-24 RX ORDER — CEFAZOLIN SODIUM/WATER 3 G/30 ML
3 SYRINGE (ML) INTRAVENOUS
Status: CANCELLED | OUTPATIENT
Start: 2023-07-27

## 2023-07-24 RX ORDER — LIDOCAINE 40 MG/G
CREAM TOPICAL
Status: CANCELLED | OUTPATIENT
Start: 2023-07-24

## 2023-07-24 NOTE — PROGRESS NOTES
MODIFIED MEETA SCALE   Timepoint: Pre-LAAC    Previous score: initial MEETA    Score Description   0 No symptoms at all   1 No significant disability despite symptoms; able to carry out all usual duties and activities   2 Slight disability; unable to carry out all previous activities, but able to look after own affairs without assistance   3 Moderate disability; requiring some help, but able to walk without assistance   4 Moderately severe disability; unable to walk without assistance and unable to attend to own bodily needs without assistance   5 Severe disability; bedridden, incontinent and requiring constant nursing care and attention   6 Dead    Total score (0 - 6):  0    No dx of TIA or Stroke    Change in score if s/p LAAC? N/A  If yes, notify implanting cardiologist.    Dejah Hurtado RN BSN  Structural Heart Coordinator   [facility]  [phone]

## 2023-07-24 NOTE — NURSING NOTE
Khari Jamison  [address] [facility] [address]  [phone] (home)     Patient in to see RN for Pre-LAAC visit on 7/24/23    All pre-procedure labs drawn: 7/24/23   EKG obtained: 7/24/23- SR    Labs reviewed: 7/24/23- Labs Collected  Renal Issues: No  Diabetic?: Yes  Device?: No  Type:  N/A    Patient instructed to be NPO after midnight the evening prior to procedure.  Patient instructed to shower the evening before or the morning of the procedure.  Leave all valuables at home (jewlery, rings, watches, large amounts of money).  Patient understands there are two visitors allowed during patients stay.   Patient instructed to arrange for transportation home following procedure from a responsible family member of friend. No driving for at least 72 hours post-procedure.  Patient instructed to have a responsible adult with them for 24 hours post-procedure.  Post-procedure follow up process.    Patient instructed on medications:   Take Eliquis, Cozar    Aspirin 81mg morning of procedure: in [facility]    Education was given to patient regarding what to expect pre-procedure.     Patient was informed procedure will be done at [facility], [address] [place] [address]. They have been instructed to check in at the  at the Hospital and their arrival time is at 5:30am    LAAC procedure, AMANDEEP  and blood consent signed at the time of the appt: NEEDS To be signed in [facility] AM of Procedure    All questions were answered to family and patient by RN.    Wife Shira present at the time of appointment. Shira will be present day of procedure.    Dejah Hurtado RN BSN  Structural Heart Coordinator   [facility]  [phone]    Patient Active Problem List   Diagnosis    Morbid obesity with BMI of 45.0-49.9, adult (H)    Type 2 diabetes mellitus without complication, without long-term current use of insulin (H)    Hyperlipidemia LDL goal <100    GASTON on CPAP    Persistent  Atrial Fibrillation    Atypical atrial flutter (H)    Calculus of kidney    Hyperoxaluria    Status post ablation of atrial fibrillation    Essential hypertension    2019 novel coronavirus disease (COVID-19)     Current Outpatient Medications   Medication Sig    apixaban ANTICOAGULANT (ELIQUIS) 5 mg Tab tablet [APIXABAN ANTICOAGULANT (ELIQUIS) 5 MG TAB TABLET] Take 1 tablet (5 mg total) by mouth every 12 (twelve) hours.    Continuous Blood Gluc Sensor (FREESTYLE OLEGARIO 2 SENSOR) MISC 1 each every 14 days Change every 14 days.    levothyroxine (SYNTHROID, LEVOTHROID) 88 MCG tablet Take 88 mcg by mouth At Bedtime    losartan (COZAAR) 25 MG tablet Take 1 tablet (25 mg) by mouth daily    metFORMIN (GLUCOPHAGE XR) 500 MG 24 hr tablet Take 1,000 mg by mouth 2 times daily (with meals)    pregabalin (LYRICA) 75 MG capsule Take 75 mg by mouth every morning    tamsulosin (FLOMAX) 0.4 MG capsule Take 0.4 mg by mouth At Bedtime    TRULICITY 1.5 MG/0.5ML pen Inject 1.5 mg Subcutaneous every 7 days Every Sundays    Vitamin D3 (VITAMIN D, CHOLECALCIFEROL,) 25 mcg (1000 units) tablet Take 1 tablet by mouth every morning    dexamethasone (DECADRON) 6 MG tablet Take 1 tablet (6 mg) by mouth daily for 5 days    evolocumab (REPATHA) 140 MG/ML prefilled autoinjector Inject 1 mL (140 mg) Subcutaneous every 14 days (Patient not taking: Reported on 6/20/2023)     No current facility-administered medications for this visit.      Allergies   Allergen Reactions    Ezetimibe GI Disturbance    Simvastatin      Other reaction(s): myalgias, arthralgias

## 2023-07-25 RX ORDER — ROSUVASTATIN CALCIUM 5 MG/1
5 TABLET, COATED ORAL EVERY OTHER DAY
COMMUNITY

## 2023-07-25 RX ORDER — SEMAGLUTIDE 0.68 MG/ML
0.5 INJECTION, SOLUTION SUBCUTANEOUS
COMMUNITY
End: 2024-02-27

## 2023-07-25 NOTE — PATIENT INSTRUCTIONS
"Recommendations from MTM Pharmacist visit:                                                    MTM (medication therapy management) is a service provided by a clinical pharmacist designed to help you get the most of out of your medicines.  You may be sent a phone or email survey evaluating today's visit.  Please provide feedback you have for the service he received today if you are able.    1.  Start Ozempic 0.5 mg weekly - Voucher sent to [facility] for 30 day supply. Start after Watchman procedure.  2. Start metamucil 1 tsp daily in 8oz liquid.  3.  We will consider alfuzosin to replace tamsulosin in the future after 90-day supply exhausted.      Follow-up: 8/14 with Nilsa Mata, Medication Therapy Management Pharmacist at [facility].      It was great speaking with you today.  I value your experience and would be very thankful for your time in providing feedback in our clinic survey. In the next few days, you may receive an email or text message from [employer] with a link to a survey related to your  clinical pharmacist.\"     To schedule another MTM appointment, please call the clinic directly or you may call the MTM scheduling line at [phone] or toll-free at [phone].     My Clinical Pharmacist's contact information:                                                      Please feel free to contact me with any questions or concerns you have.      Nilsa Mata, Pharm D., MPH  MTM Pharmacist - [facility]  Secure Voicemail: [phone]  Days in the office: Monday - Thursday          "

## 2023-07-25 NOTE — DISCHARGE INSTRUCTIONS
Going home after Left Atrial Appendage Occlusion Device Implant    Once Home:  You should arrange for someone to stay with you for the first 24 hours after discharge  Make sure you are taking all of your medications as directed in your discharge summary.  Do not stop taking these medications (especially your blood thinner and baby aspirin) without speaking to your provider.   Increase fluid intake for two days    No lifting more than 10 pounds for 5 days  No aggressive exercise for 5 days  No driving x 3 day  You may shower tomorrow; no bath x 5 days  Return to work in 3 days if you have a sedentary job or 5 days if you do manual labor      Care of groin site  It is normal to have a small bruise or lump at the site.  Do not scrub the site.  For the first 2 days: Do not stoop or squat. When you cough, sneeze or move your bowels, hold your hand over the puncture site and press gently.  Do not use lotion or powder near the puncture site for 3 days.  Ok to use ice packs at groin sites 20 minutes at a time for groin discomfort    If you start bleeding from the site in your groin, lie down flat and press firmly on the site. Call your doctor as soon as you can.    Call your doctor if:  You have a large or growing hard lump around the site.  The site is red, swollen, hot or tender.  Blood or fluid is draining from the site.  You have chills or a fever greater than 101 F (38 C).  Your leg or arm feels numb or cool.  You have hives, a rash or unusual itching.    To reduce the risk of infection, avoid dental procedures (including cleanings) for the first 6 weeks.  Contact your cardiology clinic for an antibiotic should you need to see the dentist in the first 6 weeks post left atrial appendage implant.    Dial 911 if you have bleeding that is heavy or does not stop OR for any NEW signs/symptoms of a stroke:  Visual disturbance  Problems with talking  Smile only occurs on half your face  Numbness on one side of your face or  body  Sudden headache  Confusion  Problems with walking or balance    Follow up appointments:   6 Week Post Implant Visit Date: August 24 at 12:50 PM with Gail Montoya NP  At [facility] [address], [address]  [facility] [address]    Post-procedure Transesophageal Echocardiogram: Please arrange for a responsible adult to drive you to and from this appointment. There will be nothing to eat or drink for at least 6 hours prior to your arrival time for the AMANDEEP.  Date: November 8 at 7:30 AM-  Location: [facility]      Your Procedural Physician was: Dr. Tapia     To reach the [facility] and Home nurse coordinators please call:   [phone] for Sobia Montes    Or  [phone] for Dejah Hurtado        If you have issues tonight when you get home:      Call [phone] and enter your phone number.  Yamilka the PA will call you back.      If after 9 pm, call the [facility] at [phone] and you will be connected to the on call doctor                                                                    If you are calling after hours, please listen to the entire voicemail, a live  will answer at the end of the message

## 2023-07-26 ENCOUNTER — ANESTHESIA EVENT (OUTPATIENT)
Dept: CARDIOLOGY | Facility: HOSPITAL | Age: 68
DRG: 274 | End: 2023-07-26
Payer: COMMERCIAL

## 2023-07-26 NOTE — ANESTHESIA PREPROCEDURE EVALUATION
Anesthesia Pre-Procedure Evaluation    Patient: Khari Jamison   MRN: [id number] : 1955        Procedure : Procedure(s):  Left Atrial Appendage Closure          Past Medical History:   Diagnosis Date    Arrhythmia     a-fib    Atrial fibrillation (H)     Back pain     Back pain     Diabetes mellitus (H)     Hyperlipemia     Kidney stone     Obesity     GASTON (obstructive sleep apnea)     CPAP    PONV (postoperative nausea and vomiting)     postoperatively with oral pain meds    Status post ablation of atrial fibrillation 2016    PVI 2014 (cryo-PVI + roof line + SAMUEL line)      Past Surgical History:   Procedure Laterality Date    CARDIAC ELECTROPHYSIOLOGY MAPPING AND ABLATION  16    pvi    CARDIOVERSION  10/31/14    CARDIOVERSION  2016    COLONOSCOPY N/A 2015    Procedure: COLONOSCOPY WITH POLYPECTOMY USING BIOPSY FORCEP;  Surgeon: Coco Vallecillo MD;  Location: [facility] GI;  Service:     COMBINED CYSTOSCOPY, INSERT STENT URETER(S) Bilateral 2015    Procedure: CYSTOSCOPY, BILATERAL STENT REMOVAL, LEFT URETEROSCOPY, STONE EXTRACTION, LEFT STENT INSERTION;  Surgeon: Ansley Jeffery MD;  Location: [facility] OR;  Service:     CYSTOSCOPY W/ LASER LITHOTRIPSY  aug 2015    OTHER SURGICAL HISTORY  14    pulmonary vein isolation    OTHER SURGICAL HISTORY      eyelid lift    VT ABLATE HEART DYSRHYTHM FOCUS      Description: Catheter Ablation Atrial Fibrillation;  Recorded: 10/08/2014;  Comments: 14 PVI Cyro to 4 PVs; Roof and SAMUEL lines done.    VT CARDIOVERSION ELECTIVE ARRHYTHMIA EXTERNAL  2015    Description: Elective Cardioversion External;  Recorded: 2014;  Comments: 10/11/13; ; 13 and reverted to afib after 11 days on mod dose sotalol.; ; 1/3/14  sinus on Amio    VT CARDIOVERSION ELECTIVE ARRHYTHMIA EXTERNAL      Description: Elective Cardioversion External;  Recorded: 2014;  Comments: 10/11/13; ; 13 and reverted to afib after 11 days  on mod dose sotalol.; ; 1/3/14  sinus on Amio.  10/31/14    AL CYSTO/URETERO W/LITHOTRIPSY &INDWELL STENT INSRT Right 7/16/2019    Procedure: CYSTOSCOPY RIGHT URETEROSCOPY, LASER  LITHOTRIPSY STENT INSERTION;  Surgeon: Kingston Kurtz MD;  Location: [facility];  Service: Urology    REMOVAL OF SPERM DUCT(S)      Description: Surgery Of Male Genitalia Vasectomy;  Recorded: 10/29/2013;    TONSILLECTOMY      VASECTOMY        Allergies   Allergen Reactions    Ezetimibe GI Disturbance    Simvastatin      Other reaction(s): myalgias, arthralgias      Social History     Tobacco Use    Smoking status: Never    Smokeless tobacco: Never   Substance Use Topics    Alcohol use: No     Alcohol/week: 1.0 standard drink of alcohol      Wt Readings from Last 1 Encounters:   07/24/23 (!) 153.8 kg (339 lb)        Anesthesia Evaluation   Pt has had prior anesthetic.     History of anesthetic complications  - PONV.      ROS/MED HX  ENT/Pulmonary: Comment: S/p covid 05/2023- feels recovered from a respiratory standpoint.     (+) sleep apnea, moderate, uses CPAP,                                     Neurologic:       Cardiovascular:     (+) Dyslipidemia hypertension- -  CAD -  - -                        dysrhythmias (s/f watchmann procedure. s/p PVI), a-fib,          (-) CHF   METS/Exercise Tolerance:     Hematologic:  - neg hematologic  ROS  (-) anemia   Musculoskeletal:   (+)  arthritis,             GI/Hepatic:    (-) GERD and esophageal disease   Renal/Genitourinary:     (+) renal disease, type: CRI,            Endo:     (+)  type II DM,             Obesity (BMI 43),       Psychiatric/Substance Use:       Infectious Disease:       Malignancy:  - neg malignancy ROS     Other:            Physical Exam    Airway        Mallampati: III   TM distance: > 3 FB   Neck ROM: full   Mouth opening: > 3 cm    Respiratory Devices and Support         Dental       (+) Modest Abnormalities - crowns, retainers, 1 or 2 missing  teeth      Cardiovascular   cardiovascular exam normal          Pulmonary   pulmonary exam normal                OUTSIDE LABS:  CBC:   Lab Results   Component Value Date    WBC 8.0 07/24/2023    WBC 9.1 05/07/2023    HGB 15.2 07/24/2023    HGB 14.7 05/07/2023    HCT 45.6 07/24/2023    HCT 44.4 05/07/2023     07/24/2023     05/07/2023     BMP:   Lab Results   Component Value Date     07/24/2023     07/18/2023    POTASSIUM 4.6 07/24/2023    POTASSIUM 4.1 07/18/2023    CHLORIDE 103 07/24/2023    CHLORIDE 107 07/18/2023    CO2 27 07/24/2023    CO2 25 07/18/2023    BUN 13.7 07/24/2023    BUN 12.8 07/18/2023    CR 1.08 07/24/2023    CR 1.04 07/18/2023     (H) 07/24/2023     (H) 07/18/2023     COAGS:   Lab Results   Component Value Date    INR 1.06 05/08/2023     POC: No results found for: BGM, HCG, HCGS  HEPATIC:   Lab Results   Component Value Date    ALBUMIN 3.7 05/07/2023    PROTTOTAL 7.2 05/07/2023    ALT 31 05/07/2023    AST 20 05/07/2023    ALKPHOS 98 05/07/2023    BILITOTAL 0.8 05/07/2023     OTHER:   Lab Results   Component Value Date    A1C 6.5 (H) 05/08/2023    ASHLEIGH 9.6 07/24/2023    MAG 2.0 05/08/2023    TSH 1.97 07/18/2023    CRP 3.2 (H) 05/07/2023       Anesthesia Plan    ASA Status:  3    NPO Status:  NPO Appropriate    Anesthesia Type: General.     - Airway: ETT   Induction: Intravenous, Propofol.   Maintenance: Inhalation.   Techniques and Equipment:     - Airway: Video-Laryngoscope     - Lines/Monitors: AMANDEEP            AMANDEEP Absolute Contra-indication: NONE            AMANDEEP Relative Contra-indication: NONE     - Drips/Meds: Phenylephrine     Consents    Anesthesia Plan(s) and associated risks, benefits, and realistic alternatives discussed. Questions answered and patient/representative(s) expressed understanding.     - Discussed:     - Discussed with:  Patient, Spouse            Postoperative Care    Pain management: IV analgesics, Multi-modal analgesia.   PONV  prophylaxis: Ondansetron (or other 5HT-3), Dexamethasone or Solumedrol     Comments:    Other Comments: GETA - glidescope  AMANDEEP  Phenylephrine gtt to maintain MAP  Decadron 4, zofran 4            Lexy Woods MD

## 2023-07-27 ENCOUNTER — APPOINTMENT (OUTPATIENT)
Dept: RADIOLOGY | Facility: HOSPITAL | Age: 68
DRG: 274 | End: 2023-07-27
Attending: NURSE PRACTITIONER
Payer: COMMERCIAL

## 2023-07-27 ENCOUNTER — HOSPITAL ENCOUNTER (OUTPATIENT)
Dept: CARDIOLOGY | Facility: HOSPITAL | Age: 68
Discharge: HOME | DRG: 274 | End: 2023-07-27
Attending: INTERNAL MEDICINE | Admitting: INTERNAL MEDICINE
Payer: COMMERCIAL

## 2023-07-27 ENCOUNTER — HOSPITAL ENCOUNTER (INPATIENT)
Facility: HOSPITAL | Age: 68
LOS: 1 days | Discharge: HOME | DRG: 274 | End: 2023-07-27
Attending: INTERNAL MEDICINE | Admitting: INTERNAL MEDICINE
Payer: COMMERCIAL

## 2023-07-27 ENCOUNTER — ANESTHESIA (OUTPATIENT)
Dept: CARDIOLOGY | Facility: HOSPITAL | Age: 68
DRG: 274 | End: 2023-07-27
Payer: COMMERCIAL

## 2023-07-27 VITALS
OXYGEN SATURATION: 95 % | WEIGHT: 315 LBS | HEIGHT: 74 IN | SYSTOLIC BLOOD PRESSURE: 135 MMHG | HEART RATE: 63 BPM | BODY MASS INDEX: 40.43 KG/M2 | DIASTOLIC BLOOD PRESSURE: 82 MMHG | TEMPERATURE: 97.9 F | RESPIRATION RATE: 18 BRPM

## 2023-07-27 DIAGNOSIS — I48.19 PERSISTENT ATRIAL FIBRILLATION (H): ICD-10-CM

## 2023-07-27 DIAGNOSIS — Z95.818 PRESENCE OF WATCHMAN LEFT ATRIAL APPENDAGE CLOSURE DEVICE: Primary | ICD-10-CM

## 2023-07-27 LAB
ACT BLD: 293 SECONDS (ref 74–150)
ACT BLD: 330 SECONDS (ref 74–150)
ACT BLD: 359 SECONDS (ref 74–150)
BLD PROD TYP BPU: NORMAL
BLD PROD TYP BPU: NORMAL
BLOOD COMPONENT TYPE: NORMAL
BLOOD COMPONENT TYPE: NORMAL
CODING SYSTEM: NORMAL
CODING SYSTEM: NORMAL
CREAT SERPL-MCNC: 1.05 MG/DL (ref 0.67–1.17)
CROSSMATCH: NORMAL
CROSSMATCH: NORMAL
GFR SERPL CREATININE-BSD FRML MDRD: 77 ML/MIN/1.73M2
GLUCOSE BLDC GLUCOMTR-MCNC: 114 MG/DL (ref 70–99)
GLUCOSE BLDC GLUCOMTR-MCNC: 159 MG/DL (ref 70–99)
HGB BLD-MCNC: 14 G/DL (ref 13.3–17.7)
UNIT ABO/RH: NORMAL
UNIT ABO/RH: NORMAL
UNIT NUMBER: NORMAL
UNIT NUMBER: NORMAL
UNIT STATUS: NORMAL
UNIT STATUS: NORMAL
UNIT TYPE ISBT: 6200
UNIT TYPE ISBT: 6200

## 2023-07-27 PROCEDURE — 370N000017 HC ANESTHESIA TECHNICAL FEE, PER MIN: Performed by: INTERNAL MEDICINE

## 2023-07-27 PROCEDURE — C1887 CATHETER, GUIDING: HCPCS | Performed by: INTERNAL MEDICINE

## 2023-07-27 PROCEDURE — 255N000002 HC RX 255 OP 636: Performed by: INTERNAL MEDICINE

## 2023-07-27 PROCEDURE — 258N000003 HC RX IP 258 OP 636: Performed by: INTERNAL MEDICINE

## 2023-07-27 PROCEDURE — 85018 HEMOGLOBIN: CPT | Performed by: NURSE PRACTITIONER

## 2023-07-27 PROCEDURE — 36415 COLL VENOUS BLD VENIPUNCTURE: CPT | Performed by: NURSE PRACTITIONER

## 2023-07-27 PROCEDURE — 250N000009 HC RX 250

## 2023-07-27 PROCEDURE — C1894 INTRO/SHEATH, NON-LASER: HCPCS | Performed by: INTERNAL MEDICINE

## 2023-07-27 PROCEDURE — 33340 PERQ CLSR TCAT L ATR APNDGE: CPT | Mod: Q0 | Performed by: INTERNAL MEDICINE

## 2023-07-27 PROCEDURE — 250N000011 HC RX IP 250 OP 636

## 2023-07-27 PROCEDURE — 250N000011 HC RX IP 250 OP 636: Performed by: INTERNAL MEDICINE

## 2023-07-27 PROCEDURE — 85347 COAGULATION TIME ACTIVATED: CPT

## 2023-07-27 PROCEDURE — 86923 COMPATIBILITY TEST ELECTRIC: CPT | Performed by: INTERNAL MEDICINE

## 2023-07-27 PROCEDURE — 93355 ECHO TRANSESOPHAGEAL (TEE): CPT | Performed by: INTERNAL MEDICINE

## 2023-07-27 PROCEDURE — 93355 ECHO TRANSESOPHAGEAL (TEE): CPT

## 2023-07-27 PROCEDURE — 258N000003 HC RX IP 258 OP 636

## 2023-07-27 PROCEDURE — 02L73DK OCCLUSION OF LEFT ATRIAL APPENDAGE WITH INTRALUMINAL DEVICE, PERCUTANEOUS APPROACH: ICD-10-PCS | Performed by: INTERNAL MEDICINE

## 2023-07-27 PROCEDURE — 120N000001 HC R&B MED SURG/OB

## 2023-07-27 PROCEDURE — 82962 GLUCOSE BLOOD TEST: CPT

## 2023-07-27 PROCEDURE — 250N000013 HC RX MED GY IP 250 OP 250 PS 637: Performed by: INTERNAL MEDICINE

## 2023-07-27 PROCEDURE — 999N000065 XR CHEST PORT 1 VIEW

## 2023-07-27 PROCEDURE — 82565 ASSAY OF CREATININE: CPT | Performed by: NURSE PRACTITIONER

## 2023-07-27 PROCEDURE — 710N000010 HC RECOVERY PHASE 1, LEVEL 2, PER MIN

## 2023-07-27 PROCEDURE — 272N000001 HC OR GENERAL SUPPLY STERILE: Performed by: INTERNAL MEDICINE

## 2023-07-27 PROCEDURE — 33340 PERQ CLSR TCAT L ATR APNDGE: CPT | Performed by: INTERNAL MEDICINE

## 2023-07-27 PROCEDURE — 250N000011 HC RX IP 250 OP 636: Performed by: ANESTHESIOLOGY

## 2023-07-27 DEVICE — OCCLUDER CV WATCHMAN FLX OD31 MM M635WU50310: Type: IMPLANTABLE DEVICE | Site: HEART | Status: FUNCTIONAL

## 2023-07-27 RX ORDER — HYDROMORPHONE HYDROCHLORIDE 1 MG/ML
0.4 INJECTION, SOLUTION INTRAMUSCULAR; INTRAVENOUS; SUBCUTANEOUS EVERY 5 MIN PRN
Status: DISCONTINUED | OUTPATIENT
Start: 2023-07-27 | End: 2023-07-27 | Stop reason: HOSPADM

## 2023-07-27 RX ORDER — CEFAZOLIN SODIUM/WATER 3 G/30 ML
3 SYRINGE (ML) INTRAVENOUS
Status: DISCONTINUED | OUTPATIENT
Start: 2023-07-27 | End: 2023-07-27 | Stop reason: HOSPADM

## 2023-07-27 RX ORDER — ONDANSETRON 2 MG/ML
4 INJECTION INTRAMUSCULAR; INTRAVENOUS EVERY 30 MIN PRN
Status: DISCONTINUED | OUTPATIENT
Start: 2023-07-27 | End: 2023-07-27 | Stop reason: HOSPADM

## 2023-07-27 RX ORDER — ASPIRIN 81 MG/1
81 TABLET ORAL ONCE
Status: COMPLETED | OUTPATIENT
Start: 2023-07-27 | End: 2023-07-27

## 2023-07-27 RX ORDER — ASPIRIN 81 MG/1
81 TABLET ORAL DAILY
COMMUNITY
Start: 2023-07-28

## 2023-07-27 RX ORDER — DEXAMETHASONE SODIUM PHOSPHATE 10 MG/ML
INJECTION, SOLUTION INTRAMUSCULAR; INTRAVENOUS PRN
Status: DISCONTINUED | OUTPATIENT
Start: 2023-07-27 | End: 2023-07-27

## 2023-07-27 RX ORDER — NALOXONE HYDROCHLORIDE 0.4 MG/ML
0.4 INJECTION, SOLUTION INTRAMUSCULAR; INTRAVENOUS; SUBCUTANEOUS
Status: DISCONTINUED | OUTPATIENT
Start: 2023-07-27 | End: 2023-07-27 | Stop reason: HOSPADM

## 2023-07-27 RX ORDER — NALOXONE HYDROCHLORIDE 0.4 MG/ML
0.2 INJECTION, SOLUTION INTRAMUSCULAR; INTRAVENOUS; SUBCUTANEOUS
Status: DISCONTINUED | OUTPATIENT
Start: 2023-07-27 | End: 2023-07-27 | Stop reason: HOSPADM

## 2023-07-27 RX ORDER — CLOPIDOGREL BISULFATE 75 MG/1
75 TABLET ORAL DAILY
Qty: 90 TABLET | Refills: 1 | Status: SHIPPED | OUTPATIENT
Start: 2023-07-27 | End: 2023-10-26

## 2023-07-27 RX ORDER — ONDANSETRON 2 MG/ML
4 INJECTION INTRAMUSCULAR; INTRAVENOUS EVERY 6 HOURS PRN
Status: DISCONTINUED | OUTPATIENT
Start: 2023-07-27 | End: 2023-07-27 | Stop reason: HOSPADM

## 2023-07-27 RX ORDER — SODIUM CHLORIDE 9 MG/ML
INJECTION, SOLUTION INTRAVENOUS CONTINUOUS
Status: ACTIVE | OUTPATIENT
Start: 2023-07-27 | End: 2023-07-27

## 2023-07-27 RX ORDER — ASPIRIN 81 MG/1
81 TABLET ORAL DAILY
Status: DISCONTINUED | OUTPATIENT
Start: 2023-07-28 | End: 2023-07-27 | Stop reason: HOSPADM

## 2023-07-27 RX ORDER — ONDANSETRON 4 MG/1
4 TABLET, ORALLY DISINTEGRATING ORAL EVERY 6 HOURS PRN
Status: DISCONTINUED | OUTPATIENT
Start: 2023-07-27 | End: 2023-07-27 | Stop reason: HOSPADM

## 2023-07-27 RX ORDER — LIDOCAINE 40 MG/G
CREAM TOPICAL
Status: DISCONTINUED | OUTPATIENT
Start: 2023-07-27 | End: 2023-07-27 | Stop reason: HOSPADM

## 2023-07-27 RX ORDER — SODIUM CHLORIDE, SODIUM LACTATE, POTASSIUM CHLORIDE, CALCIUM CHLORIDE 600; 310; 30; 20 MG/100ML; MG/100ML; MG/100ML; MG/100ML
INJECTION, SOLUTION INTRAVENOUS CONTINUOUS
Status: DISCONTINUED | OUTPATIENT
Start: 2023-07-27 | End: 2023-07-27 | Stop reason: HOSPADM

## 2023-07-27 RX ORDER — HEPARIN SODIUM 1000 [USP'U]/ML
INJECTION, SOLUTION INTRAVENOUS; SUBCUTANEOUS
Status: DISCONTINUED | OUTPATIENT
Start: 2023-07-27 | End: 2023-07-27 | Stop reason: HOSPADM

## 2023-07-27 RX ORDER — FENTANYL CITRATE 50 UG/ML
25 INJECTION, SOLUTION INTRAMUSCULAR; INTRAVENOUS EVERY 5 MIN PRN
Status: DISCONTINUED | OUTPATIENT
Start: 2023-07-27 | End: 2023-07-27 | Stop reason: HOSPADM

## 2023-07-27 RX ORDER — OXYCODONE HYDROCHLORIDE 5 MG/1
5 TABLET ORAL EVERY 4 HOURS PRN
Status: DISCONTINUED | OUTPATIENT
Start: 2023-07-27 | End: 2023-07-27 | Stop reason: HOSPADM

## 2023-07-27 RX ORDER — ONDANSETRON 4 MG/1
4 TABLET, ORALLY DISINTEGRATING ORAL EVERY 30 MIN PRN
Status: DISCONTINUED | OUTPATIENT
Start: 2023-07-27 | End: 2023-07-27 | Stop reason: HOSPADM

## 2023-07-27 RX ORDER — IODIXANOL 320 MG/ML
INJECTION, SOLUTION INTRAVASCULAR
Status: DISCONTINUED | OUTPATIENT
Start: 2023-07-27 | End: 2023-07-27 | Stop reason: HOSPADM

## 2023-07-27 RX ORDER — PROTAMINE SULFATE 10 MG/ML
INJECTION, SOLUTION INTRAVENOUS PRN
Status: DISCONTINUED | OUTPATIENT
Start: 2023-07-27 | End: 2023-07-27

## 2023-07-27 RX ORDER — ONDANSETRON 2 MG/ML
INJECTION INTRAMUSCULAR; INTRAVENOUS PRN
Status: DISCONTINUED | OUTPATIENT
Start: 2023-07-27 | End: 2023-07-27

## 2023-07-27 RX ORDER — PROPOFOL 10 MG/ML
INJECTION, EMULSION INTRAVENOUS PRN
Status: DISCONTINUED | OUTPATIENT
Start: 2023-07-27 | End: 2023-07-27

## 2023-07-27 RX ORDER — HYDROMORPHONE HYDROCHLORIDE 1 MG/ML
0.2 INJECTION, SOLUTION INTRAMUSCULAR; INTRAVENOUS; SUBCUTANEOUS EVERY 5 MIN PRN
Status: DISCONTINUED | OUTPATIENT
Start: 2023-07-27 | End: 2023-07-27 | Stop reason: HOSPADM

## 2023-07-27 RX ORDER — FENTANYL CITRATE 50 UG/ML
INJECTION, SOLUTION INTRAMUSCULAR; INTRAVENOUS PRN
Status: DISCONTINUED | OUTPATIENT
Start: 2023-07-27 | End: 2023-07-27

## 2023-07-27 RX ORDER — OXYCODONE HYDROCHLORIDE 5 MG/1
10 TABLET ORAL EVERY 4 HOURS PRN
Status: DISCONTINUED | OUTPATIENT
Start: 2023-07-27 | End: 2023-07-27 | Stop reason: HOSPADM

## 2023-07-27 RX ORDER — SODIUM CHLORIDE 9 MG/ML
1000 INJECTION, SOLUTION INTRAVENOUS CONTINUOUS
Status: DISCONTINUED | OUTPATIENT
Start: 2023-07-27 | End: 2023-07-27 | Stop reason: HOSPADM

## 2023-07-27 RX ORDER — LIDOCAINE HYDROCHLORIDE 10 MG/ML
INJECTION, SOLUTION INFILTRATION; PERINEURAL PRN
Status: DISCONTINUED | OUTPATIENT
Start: 2023-07-27 | End: 2023-07-27

## 2023-07-27 RX ORDER — FENTANYL CITRATE 50 UG/ML
50 INJECTION, SOLUTION INTRAMUSCULAR; INTRAVENOUS EVERY 5 MIN PRN
Status: DISCONTINUED | OUTPATIENT
Start: 2023-07-27 | End: 2023-07-27 | Stop reason: HOSPADM

## 2023-07-27 RX ORDER — ACETAMINOPHEN 325 MG/1
650 TABLET ORAL EVERY 4 HOURS PRN
Status: DISCONTINUED | OUTPATIENT
Start: 2023-07-27 | End: 2023-07-27 | Stop reason: HOSPADM

## 2023-07-27 RX ORDER — HALOPERIDOL 5 MG/ML
1 INJECTION INTRAMUSCULAR
Status: DISCONTINUED | OUTPATIENT
Start: 2023-07-27 | End: 2023-07-27 | Stop reason: HOSPADM

## 2023-07-27 RX ADMIN — LIDOCAINE HYDROCHLORIDE 5 ML: 10 INJECTION, SOLUTION INFILTRATION; PERINEURAL at 07:00

## 2023-07-27 RX ADMIN — SUGAMMADEX 320 MG: 100 INJECTION, SOLUTION INTRAVENOUS at 08:39

## 2023-07-27 RX ADMIN — ONDANSETRON 4 MG: 2 INJECTION INTRAMUSCULAR; INTRAVENOUS at 08:38

## 2023-07-27 RX ADMIN — PHENYLEPHRINE HYDROCHLORIDE 0.3 MCG/KG/MIN: 10 INJECTION INTRAVENOUS at 07:18

## 2023-07-27 RX ADMIN — FENTANYL CITRATE 25 MCG: 50 INJECTION, SOLUTION INTRAMUSCULAR; INTRAVENOUS at 10:01

## 2023-07-27 RX ADMIN — PROPOFOL 200 MG: 10 INJECTION, EMULSION INTRAVENOUS at 07:01

## 2023-07-27 RX ADMIN — PROTAMINE SULFATE 50 MG: 10 INJECTION, SOLUTION INTRAVENOUS at 08:35

## 2023-07-27 RX ADMIN — SODIUM CHLORIDE: 9 INJECTION, SOLUTION INTRAVENOUS at 07:42

## 2023-07-27 RX ADMIN — ROCURONIUM BROMIDE 50 MG: 50 INJECTION, SOLUTION INTRAVENOUS at 07:02

## 2023-07-27 RX ADMIN — Medication 3 G: at 06:58

## 2023-07-27 RX ADMIN — ROCURONIUM BROMIDE 10 MG: 50 INJECTION, SOLUTION INTRAVENOUS at 08:18

## 2023-07-27 RX ADMIN — SODIUM CHLORIDE 500 ML: 9 INJECTION, SOLUTION INTRAVENOUS at 06:08

## 2023-07-27 RX ADMIN — DEXAMETHASONE SODIUM PHOSPHATE 4 MG: 10 INJECTION, SOLUTION INTRAMUSCULAR; INTRAVENOUS at 07:18

## 2023-07-27 RX ADMIN — ROCURONIUM BROMIDE 20 MG: 50 INJECTION, SOLUTION INTRAVENOUS at 07:17

## 2023-07-27 RX ADMIN — ASPIRIN 81 MG CHEWABLE TABLET 81 MG: 81 TABLET CHEWABLE at 06:04

## 2023-07-27 RX ADMIN — FENTANYL CITRATE 100 MCG: 50 INJECTION, SOLUTION INTRAMUSCULAR; INTRAVENOUS at 07:00

## 2023-07-27 RX ADMIN — ROCURONIUM BROMIDE 10 MG: 50 INJECTION, SOLUTION INTRAVENOUS at 08:17

## 2023-07-27 RX ADMIN — ROCURONIUM BROMIDE 20 MG: 50 INJECTION, SOLUTION INTRAVENOUS at 07:23

## 2023-07-27 ASSESSMENT — ACTIVITIES OF DAILY LIVING (ADL)
ADLS_ACUITY_SCORE: 35

## 2023-07-27 ASSESSMENT — ENCOUNTER SYMPTOMS: DYSRHYTHMIAS: 1

## 2023-07-27 NOTE — ANESTHESIA PROCEDURE NOTES
Airway       Patient location during procedure: OR       Procedure Start/Stop Times: 7/27/2023 7:04 AM  Staff -        CRNA: Kingston Sandoval APRN CRNA       Performed By: CRNA  Consent for Airway        Urgency: elective  Indications and Patient Condition       Indications for airway management: sara-procedural       Induction type:intravenous       Mask difficulty assessment: 3 - difficult mask (inadequate, unstable, or two providers) +/- NMBA    Final Airway Details       Final airway type: endotracheal airway       Successful airway: ETT - single and Oral  Endotracheal Airway Details        ETT size (mm): 8.0       Cuffed: yes       Successful intubation technique: video laryngoscopy       VL Blade Size: Glidescope 4       Grade View of Cords: 1       Adjucts: stylet       Position: Right       Measured from: lips       Secured at (cm): 25       Bite block used: None    Post intubation assessment        Number of attempts at approach: 1       Number of other approaches attempted: 0       Secured with: silk tape       Ease of procedure: easy       Dentition: Intact and Unchanged    Medication(s) Administered   Medication Administration Time: 7/27/2023 7:04 AM    Additional Comments       Easy 2-handed 2-provider mask.

## 2023-07-27 NOTE — INTERVAL H&P NOTE
"I have reviewed the surgical (or preoperative) H&P that is linked to this encounter, and examined the patient. There are no significant changes    Clinical Conditions Present on Arrival:  Clinically Significant Risk Factors Present on Admission                # Drug Induced Coagulation Defect: home medication list includes an anticoagulant medication   # DMII: A1C = 6.5 % (Ref range: <5.7 %) within past 6 months  # Severe Obesity: Estimated body mass index is 43.53 kg/m  as calculated from the following:    Height as of this encounter: 1.88 m (6' 2\").    Weight as of this encounter: 153.8 kg (339 lb).       "

## 2023-07-27 NOTE — H&P
H/P was performed on 7/18/2023 by Dr. Dave Park and is available for review via the following hyperlink:       Associated Documents  Progress Note/Clinic Note - Outside Records - Scan on 7/24/2023 5:34 AM: OUTSIDE RECORDS

## 2023-07-27 NOTE — Clinical Note
0 : 22.8. 45 : 22. 90 : 17.8. 135 : 20.8. 0 : 29. 45 : 30.2. 90 : 32.2. 135 : 27.1. 0 : 21 . 45 : 24 . 90 : 25.2 . 135 : 22.3 . CALLY type used: Wind SockPosition: Passed. [place]: Passed. Size: Accepted. Seal: Complete. Jet: 0.WATCHMAN FLX 27 mm, GTIN: [id number], LOT: [id number], expiration date 2026-05-15 NOT DEPLOYED.  WATCHMAN FLX 31 mm, GTIN: [id number], LOT: [id number], expiration date 2026-03-28.

## 2023-07-27 NOTE — ANESTHESIA CARE TRANSFER NOTE
Patient: Khari Jamison    Procedure: Procedure(s):  Left Atrial Appendage Closure       Diagnosis: other  Diagnosis Additional Information: No value filed.    Anesthesia Type:   General     Note:    Oropharynx: oropharynx clear of all foreign objects and spontaneously breathing  Level of Consciousness: drowsy  Oxygen Supplementation: face mask  Level of Supplemental Oxygen (L/min / FiO2): 10  Independent Airway: airway patency satisfactory and stable  Dentition: dentition unchanged  Vital Signs Stable: post-procedure vital signs reviewed and stable  Report to RN Given: handoff report given  Patient transferred to: Cardiac Special Care          Vitals:  Vitals Value Taken Time   /66 07/27/23 0853   Temp 97.6 07/27/23 0853   Pulse 58 07/27/23 0853   Resp 14 07/27/23 0853   SpO2 99 % 07/27/23 0853   Vitals shown include unvalidated device data.    Electronically Signed By: MAGDA Moreno CRNA  July 27, 2023  8:55 AM

## 2023-07-27 NOTE — ANESTHESIA POSTPROCEDURE EVALUATION
Patient: Khari Jamison    Procedure: Procedure(s):  Left Atrial Appendage Closure       Anesthesia Type:  General    Note:  Disposition: Inpatient   Postop Pain Control: Uneventful            Sign Out: Well controlled pain   PONV: No   Neuro/Psych: Uneventful            Sign Out: Acceptable/Baseline neuro status   Airway/Respiratory: Uneventful            Sign Out: Acceptable/Baseline resp. status   CV/Hemodynamics: Uneventful            Sign Out: Acceptable CV status; No obvious hypovolemia; No obvious fluid overload   Other NRE: NONE   DID A NON-ROUTINE EVENT OCCUR?            Last vitals:  Vitals Value Taken Time   /81 07/27/23 1215   Temp 36.6  C (97.9  F) 07/27/23 0852   Pulse 61 07/27/23 1225   Resp 20 07/27/23 1225   SpO2 96 % 07/27/23 1225   Vitals shown include unvalidated device data.    Electronically Signed By: Lexy Woods MD  July 27, 2023  12:27 PM

## 2023-07-27 NOTE — DISCHARGE SUMMARY
HEART CARE INPATIENT ENCOUNTER NOTE      Structural Discharge Summary    Primary Care Physician:  Shi Sneed    Discharge Provider: GLENIS FERNANDEZ PA-C     Admission Date: 7/27/2023. Admission Diagnoses: Persistent atrial fibrillation (H) [I48.19]   Discharge Date: July 27, 2023   Disposition: Home   Condition at Discharge: Stable  Code Status: Prior     Principal Diagnosis:  Persistent atrial fibrillation    Discharge Diagnoses:  Active Problems:    Type 2 diabetes mellitus without complication, without long-term current use of insulin (H)    GASTON on CPAP    Persistent Atrial Fibrillation    Atypical atrial flutter (H)    Status post ablation of atrial fibrillation    Essential hypertension    Presence of Watchman left atrial appendage closure device      Consult/s: none  Significant Diagnostic Studies:   Procedural AMANDEEP - Interpretation Summary     Structural AMANDEEP for Left Atrial Occlusion Device     Pre-Device:  1. Normal left ventricular size and systolic function. LVEF:60%  2. No left atrial thrombus or spontaneous contrast. Mild to moderate left  atrial enlargement.  3. No ASD or PFO by color flow.  4. No pericardial effusion.     Post Device:  1. Well-seated PINNACLE FLXÂ  Device in left atrial appendage by 2D and 3D  imaging. No color doppler evidence of flow around device at ostium insertion  before or after device release. No change in mitral valve function. No  thrombus noted on device, LA or CALLY.     CALLY device measurements:  Device compression diameter:  Pre-deployment.  0Â : 22.8 mm  45Â : 22.3 mm  90Â : 18.2 mm  135 Â : 21.7 mm     Post-deployment  0Â : 21.2 mm  45Â : 23.5 mm  90Â : 23.4 mm  135 Â : 22.3 mm     2. Normal left ventricular size and systolic function. LVEF:60%  3. Small ASD with unidirectional flow (left to right) bycolor flow doppler.  4. No post procedural pericardial effusion.     CXR:  EXAM: XR CHEST PORT 1 VIEW  LOCATION: [facility]  DATE:  7/27/2023     INDICATION: please overpenetrate film in order to visualize the watchman device  COMPARISON: 05/07/2023                                                                      IMPRESSION: The new watchman device is just barely appreciated on this portable study. No hydropneumothorax. Heart and pulmonary vascularity are normal.    **I have personally reviewed the film and visualize the device in correct placement**    Treatments: procedures: LAAO implant (Watchman FLX)    Discharge Medications:   Discharge Medication List as of 7/27/2023  1:11 PM        START taking these medications    Details   aspirin 81 MG EC tablet Take 1 tablet (81 mg) by mouth daily, OTC      clopidogrel (PLAVIX) 75 MG tablet Take 1 tablet (75 mg) by mouth daily, Disp-90 tablet, R-1, E-Prescribe           CONTINUE these medications which have NOT CHANGED    Details   levothyroxine (SYNTHROID, LEVOTHROID) 88 MCG tablet Take 88 mcg by mouth At Bedtime, Historical      losartan (COZAAR) 25 MG tablet Take 1 tablet (25 mg) by mouth daily, Disp-90 tablet, R-3, E-Prescribe      metFORMIN (GLUCOPHAGE XR) 500 MG 24 hr tablet Take 1,000 mg by mouth 2 times daily (with meals), Historical      pregabalin (LYRICA) 75 MG capsule Take 75 mg by mouth every morning, Historical      rosuvastatin (CRESTOR) 5 MG tablet Take 5 mg by mouth every other day, Historical      tamsulosin (FLOMAX) 0.4 MG capsule Take 0.4 mg by mouth At Bedtime, Historical      Vitamin D3 (VITAMIN D, CHOLECALCIFEROL,) 25 mcg (1000 units) tablet Take 1 tablet by mouth every morning, Historical      Continuous Blood Gluc Sensor (FREESTYLE OLEGARIO 2 SENSOR) MISC 1 each every 14 days Change every 14 days., Historical      psyllium (METAMUCIL/KONSYL) 58.6 % powder Take 1 teaspoonful by mouth daily, Historical      semaglutide (OZEMPIC, 0.25 OR 0.5 MG/DOSE,) 2 MG/3ML pen Inject 0.5 mg Subcutaneous every 7 days, Historical           STOP taking these medications       apixaban  "ANTICOAGULANT (ELIQUIS) 5 mg Tab tablet Comments:   Reason for Stopping:               Discharge Instructions:  Follow up appointment with Gail Montoya NP in 45 days (August 24)    Follow up Echocardiogram in 3-4 months (November 8)    Diet: Regular diet. Increase fluids over the next 2 days.   Activity: Activity as tolerated   Restrictions: No lifting >10 lbs or vigorous exercise for 5 days. No driving for 3 days. May return to work in 5 days  Wound / drain care: OK to shower next day. Keep wound clean and dry. Ok to use Ice packs PRN for discomfort. No baths, hot tubs or swimming pools for 5 days.    Hospital Summary:   Mr. Khari Jamison is a 68 year old male who underwent successful LAAO implant with a 31 mm Watchman FLX device. The patient was recovered in [facility], on bedrest for 4 hours.  The patient then dangled at the edge of bed and ambulated; He voided without difficulty.  Vascular access site remained stable prior to discharge.  Post procedure the patient denied chest pain/pressure, palpitations, or shortness of breath.      Repeat labs were reviewed and were stable.  Activity restrictions and reportable signs and symptoms were discussed with the patient who verbalizes understanding.  He will stop taking Eliquis.  Tomorrow he will start DAPT with ASA 81 mg daily and Plavix 75 mg daily and he will continue this for 6 months, at which time he will stop Plavix and continue ASA indefinitely.     Follow up is arranged as above.        Physical Examination   /82   Pulse 63   Temp 97.9  F (36.6  C) (Oral)   Resp 18   Ht 1.88 m (6' 2\")   Wt (!) 153.8 kg (339 lb)   SpO2 95%   BMI 43.53 kg/m    Body mass index is 43.53 kg/m .  Wt Readings from Last 3 Encounters:   07/27/23 (!) 153.8 kg (339 lb)   07/24/23 (!) 153.8 kg (339 lb)   07/19/23 (!) 151.4 kg (333 lb 12.8 oz)       Intake/Output Summary (Last 24 hours) at 7/27/2023 1049  Last data filed at 7/27/2023 1025  Gross per 24 hour   Intake 800 ml "   Output 250 ml   Net 550 ml     General Appearance:   no distress, normal body habitus   Chest/Lungs:   Normal respiratory effort. Respirations are even and unlabored. Lungs are clear to auscultation, no rales or wheezing. No chest wall tenderness.    Cardiovascular:   Regular. Normal S1, S2 with no murmurs, rubs, or gallops; the carotid, radial, dorsalis pedis and posterior tibial pulses are intact   Abdomen:  soft, non-tender to palpation, non-distended. + bowel sounds.   Extremities: No edema    Skin: Right groin site WNL; Stitch in place   Neurologic: Alert and oriented x3, calm and able to move all 4 extremities appropriately            Lab Results    Chemistry/lipid CBC Cardiac Enzymes/BNP/TSH/INR   Creat 7/27/2023 - 1.05 Hgb 7/27/2023 - 14.0 Lab Results   Component Value Date     (H) 05/07/2023    TSH 1.97 07/18/2023    INR 1.06 05/08/2023

## 2023-07-27 NOTE — PLAN OF CARE
Goal Outcome Evaluation:               Pt admitted for CALLY. Pt prepped and ready for procedure. Wife Kenyetta is here for support.      Lizzette Martines RN

## 2023-07-27 NOTE — PROGRESS NOTES
Patient was kept comfortable during post-procedure stay.VSS. Denies pain. Right femoral access site remains dry & free from signs of bleeding. Stopcock and suture removed from right groin without incident.   Pt able to eat, drink, ambulate and void post-procedure.    Appointments made & included in AVS. Dr. Tapia was able to speak with patient and his wife  post procedure. Post-op instructions given to patient & spouse. Able to ask questions. Verbalized no concerns. Belongings returned. Discharged to home in stable condition.

## 2023-07-28 ENCOUNTER — PREP FOR PROCEDURE (OUTPATIENT)
Dept: CARDIOLOGY | Facility: CLINIC | Age: 68
End: 2023-07-28
Payer: COMMERCIAL

## 2023-07-28 ENCOUNTER — TELEPHONE (OUTPATIENT)
Dept: CARDIOLOGY | Facility: CLINIC | Age: 68
End: 2023-07-28
Payer: COMMERCIAL

## 2023-07-28 DIAGNOSIS — Z95.818 PRESENCE OF WATCHMAN LEFT ATRIAL APPENDAGE CLOSURE DEVICE: Primary | ICD-10-CM

## 2023-07-28 DIAGNOSIS — I25.10 CORONARY ARTERY DISEASE INVOLVING NATIVE CORONARY ARTERY OF NATIVE HEART WITHOUT ANGINA PECTORIS: ICD-10-CM

## 2023-07-28 RX ORDER — LIDOCAINE 40 MG/G
CREAM TOPICAL
Status: CANCELLED | OUTPATIENT
Start: 2023-07-28

## 2023-07-28 RX ORDER — SODIUM CHLORIDE 9 MG/ML
INJECTION, SOLUTION INTRAVENOUS CONTINUOUS
Status: CANCELLED | OUTPATIENT
Start: 2023-07-28

## 2023-07-28 NOTE — TELEPHONE ENCOUNTER
Pt was called for post op 31mm Watchman Flx device placement with Dr. Tapia on 7/28/23.    Pt reports, LSC, denies SOB, and reports no peripheral edema noted, no abdominal bloating or discomfort.     Pt denies any neurological changes at this time.    Pt denies generalized or localized pain at this time.    Pt is having bowel movements and is voiding without difficulty.      Patient wanted to wait to remove bandage from right groin site until later in the day.   Pt reports no drainage, no bruising noted around puncture site, groin is soft, and pt denies pain at the site.  Instructed to remove bandage     Pt continues antiplatelet medications: Daily 75 mg Plavix (Clopidogrel) and daily 81 mg Aspirin.    Discussed the following via phone:   It is important to remain on your antiplatelet medication uninterrupted after your left atrial occlusion device implant to reduce your risk of a stroke or heart attack, DO NOT STOP THIS MEDICATION.  You will remain on once daily 81 mg Aspirin for the remainder of your life  You will remain on once daily 75 mg Plavix (Clopidogrel) until you are six months from your Watchman Procedure date, approximately January 27th 2024.     Healing from your left atrial closure device implant:    Stay well hydrated, it is important to increase your fluid intake during your recovery period.  Eat foods high in protein to help the healing process.  Gradually increase your activity over the several days, back to baseline;  No aggressive exercise for 5 days post-procedure.  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  No lifting more that 10 lb for 5 days after procedure.  No driving for 3 days post-procedure.   Keep your incision site clean, dry and open to air.   Ok to use ice packs at groin sites for 20 minutes at a time for groin discomfort.   Please delay any dental procedures until 6 weeks post implant. You will not require anti-biotic prophylaxis  treatment after this time frame.  If you need urgent dental care prior to the 6 week post-procedure restriction, please contact your cardiologist for prophylactic antibiotic.     Please call me if any of the following occur:    Shortness of breath, dizziness, or chest pain.  Changes in your groin sites including:  Swelling, hardening, drainage, increase in bruising or an increase in pain.          Dial 911 for signs/symptoms of a stroke:    New onset visual disturbance.  New problems with talking/speech.  Smile only occurs on half your face.  Numbness on one side of your body or face.  Sudden headache.  New onset of confusion.  New problems with walking or balance.     Important information regarding your future follow up appointments:    45 Day post-implant follow-up with our Advance Practice Practitioner (IRWIN)  Pre-Procedure History and Physical (H&P) to be completed within 30 days of your AMANDEEP by your Primary Care Provider.  3 month post-implant AMANDEEP to assess your Watchman Device.  You will be contacted after your AMANDEEP is complete to discuss results within 2-3 days by a Structural RN Coordinator.     Ensured patient had after hour contact for the on call service at [phone].     Dejah Hurtado RN BSN  Structural Heart Coordinator   [facility]  [phone]

## 2023-08-04 PROBLEM — I48.19 PERSISTENT ATRIAL FIBRILLATION (H): Status: ACTIVE | Noted: 2023-08-04

## 2023-08-04 NOTE — PATIENT INSTRUCTIONS
Khari Jamison,    It was a pleasure to see you today at the [facility].     My recommendations after this visit include:    Please call if atrial fibrillation episodes more frequent .      I discussed the watchman procedure today.  You need outpatient CT with contrast to see if the watchman would fit in the pouch off your heart.  The watchman is to protect you from stroke risk and get you off ELIQUIS  after the procedure.  If you go on to get watchman placement, you will stay on eliquis until after your watchman  procedure.  You will switch from yur blood thinner to aspirin 81 mg 1 tab p.o. daily plus clopidogrel 75 mg p.o. daily for 6 months.  After 6 months, you will stop clopidogrel and stay on aspirin 81 mg 1 tablet orally every day.  You will have a transesophageal echo or CT 3-4 months after you get the watchman to make sure there is no blood leaking around the device.  The watchman coordinator is Sobia Green their number is [phone].    To followup with us 1-2 weeks before watchman procedure.      My contact information:  Skyler Hermosillo, STARR  After Hours or Scheduling  [phone]  My Nurse---Shavon Wang [phone]     
What Is The Reason For Today's Visit?: Full Body Skin Examination
What Is The Reason For Today's Visit? (Being Monitored For X): concerning skin lesions on an annual basis

## 2023-08-14 ENCOUNTER — OFFICE VISIT (OUTPATIENT)
Dept: PHARMACY | Facility: PHYSICIAN GROUP | Age: 68
End: 2023-08-14
Payer: COMMERCIAL

## 2023-08-14 VITALS
BODY MASS INDEX: 42.73 KG/M2 | WEIGHT: 315 LBS | HEART RATE: 65 BPM | SYSTOLIC BLOOD PRESSURE: 100 MMHG | DIASTOLIC BLOOD PRESSURE: 60 MMHG

## 2023-08-14 DIAGNOSIS — E11.9 TYPE 2 DIABETES MELLITUS WITHOUT COMPLICATION, WITHOUT LONG-TERM CURRENT USE OF INSULIN (H): Primary | ICD-10-CM

## 2023-08-14 DIAGNOSIS — N40.1 BENIGN PROSTATIC HYPERPLASIA WITH INCOMPLETE BLADDER EMPTYING: ICD-10-CM

## 2023-08-14 DIAGNOSIS — R52 PAIN: ICD-10-CM

## 2023-08-14 DIAGNOSIS — I25.10 CORONARY ARTERY DISEASE INVOLVING NATIVE CORONARY ARTERY OF NATIVE HEART WITHOUT ANGINA PECTORIS: ICD-10-CM

## 2023-08-14 DIAGNOSIS — G62.9 NEUROPATHY: ICD-10-CM

## 2023-08-14 DIAGNOSIS — E66.01 MORBID OBESITY (H): ICD-10-CM

## 2023-08-14 DIAGNOSIS — R39.14 BENIGN PROSTATIC HYPERPLASIA WITH INCOMPLETE BLADDER EMPTYING: ICD-10-CM

## 2023-08-14 DIAGNOSIS — I48.0 PAROXYSMAL ATRIAL FIBRILLATION (H): ICD-10-CM

## 2023-08-14 PROCEDURE — 99606 MTMS BY PHARM EST 15 MIN: CPT | Performed by: PHARMACIST

## 2023-08-14 PROCEDURE — 99607 MTMS BY PHARM ADDL 15 MIN: CPT | Performed by: PHARMACIST

## 2023-08-14 RX ORDER — ALFUZOSIN HYDROCHLORIDE 10 MG/1
10 TABLET, EXTENDED RELEASE ORAL DAILY
COMMUNITY

## 2023-08-14 NOTE — PROGRESS NOTES
Medication Therapy Management (MTM) Encounter    ASSESSMENT:                              Medication Adherence/Access: No issues identified.  -----------------------------------------    Afib/CAD: Blood pressure at goal less than 130/80 mmHg. Blood pressure low and patient experiences fatigue -recommend decreasing losartan today. Patient does not have strong indication for ARB other than cardiac remodeling (no microalbuminuria, no MI history).   Tolerating rosuvastatin well. Will recheck fasting lipids at upcoming visit.  .  -----------------------------------------  Type 2 Diabetes: A1c at goal less than 7%.  Metformin optimized at 2 g daily for macrovascular benefit.  Patient would benefit from optimizing GLP-1 for weight management and blood sugar control.  Tolerating Ozempic 0.5 mg weekly -we will consider increase at future visit for continued weight management benefit.  Did not have time to discuss replacing Eliquis with aspirin after Watchman procedure.  We will discuss at future visit.  Taking statin and ARB.     -----------------------------------------  BPH: Recommend switch from tamsulosin to alfuzosin as less risk of retrograde ejaculation.    -----------------------------------------    Neuropathy: Recommend continued dose of pregabalin 75 mg twice daily.  Acetaminophen safe and effective for muscle pain in addition to pregabalin for neuropathy.    PLAN:                            1.  Start alfuzosin to replace tamsulosin   2.  Reduce losartan to 1/2 tablet daily   3.  Continue pregabalin twice daily  4.  Take acetaminophen ER 2 tabs every 8 hours as needed for any muscle pain in addition to neuropathy    Follow-up: 9/26 at 9:30 am with Nilsa Mata Medication Therapy Management Pharmacist at [facility].    SUBJECTIVE/OBJECTIVE:                          Khari Jamison is a 67 year old male coming in for a follow-up visit. Patient was accompanied by wife, Kenyetta. Today's visit is a  follow-up [facility] visit from 7/19/23     Reason for visit: Medication Therapy Management .    Allergies/ADRs: Reviewed in chart  Past Medical History: Reviewed in chart  Tobacco: He reports that he has never smoked. He has never used smokeless tobacco.  Alcohol: Less than 1 beverages / week  Caffeine: Not currently using    -----------------------------------------    Medication Adherence/Access: Patient receiving Ozempic from patient assistance program.  Eliquis was previously expensive, now had Watchman procedure -no longer needed.    -----------------------------------------    Afib/CAD:   Losartan 25 mg once daily  Rosuvastatin 5 mg tablet every other day for weeks x 8 weeks    Patient had successful Watchman procedure about 4 weeks ago.  No myalgias since starting rosuvastatin.    Patient does not self-monitor blood pressure.    Lab Test 03/01/23  1319   CHOL 286*   HDL 43   *   TRIG 187*     BP Readings from Last 3 Encounters:   08/14/23 100/60   07/27/23 135/82   07/24/23 108/58       -----------------------------------------    Type 2 Diabetes:   Metformin 500 mg ER tablets -2 tablets twice daily  Ozempic 0.5 mg weekly    Denies side effects since switching to Ozempic from Trulicity.  Psyllium helpful in forming stools.  Blood sugar monitoring: CGM    Continuous Glucose Monitoring Results:   Average Glucose Last 14 Days = 121 mg/dl    Time in Target Last 14 Days:  Above 180 mg/dL: 2%  In target  mg/dL: 98%  Below 70 mg/dL: 0%    Symptoms of low blood sugar? none  Symptoms of high blood sugar? none  Eye exam: up to date -February 2023  Foot exam: up to date -September 2022  Diet/Exercise: Patient works to limit carbohydrates/sweets and eat healthfully.  Goal is to manage blood sugar and weight.  Aspirin: Not taking - prefers not to take   Statin: Rosuvastatin -denies side effects  ACEi/ARB: losartan. -Denies side effects  Microalbumin (3/21/2023): Normal  A1C (7/18/2023):  6.1%            -----------------------------------------    BPH:   Tamsulosin 0.4 mg capsule once daily 30 min after dinner    Patient notes improvement in urinary symptoms with starting tamsulosin.  Does note occasional retrograde ejaculation -denies this being painful or concerning as no goal for pregnancy.  Patient symptoms include frequent urination, difficulty emptying bladder and interrupted void stream -all improved.  .    -----------------------------------------  .    Neuropathy:  Pregabalin 25 mg capsule 1 capsule daily    Patient recently increased pregabalin to twice daily for worsening neuropathy.  This has been helpful.  Patient also having occasional leg pain that he feels is different from neuropathy.  Patient denies this ache similar to statin myalgia he is experienced in the past.  Much less severe and only occasional breakthrough muscle pain.  Patient attributes this more to deconditioning after severe COVID.  Wonders what to take.  Denies side effects.      Today's Vitals: /60   Pulse 65   Wt (!) 332 lb 12.8 oz (151 kg)   BMI 42.73 kg/m    ----------------      I spent 30 minutes with this patient today. All changes were made via collaborative practice agreement with Shi Sneed MD. A copy of the visit note was provided to the patient's provider(s).    A summary of these recommendations was given to the patient.    Nilsa Mata, Pharm D., MPH  MTM Pharmacist - [facility]  Secure Voicemail: [phone]  In Office: Monday - Thursday         Medication Therapy Recommendations  Benign prostatic hyperplasia with incomplete bladder emptying    Current Medication: tamsulosin (FLOMAX) 0.4 MG capsule (Discontinued)   Rationale: Undesirable effect - Adverse medication event - Safety   Recommendation: Change Medication - Change to alfuzosin   Status: Accepted per CPA         Coronary artery disease involving native coronary artery of native heart without angina pectoris    Current  Medication: losartan (COZAAR) 25 MG tablet   Rationale: Dose too high - Dosage too high - Safety   Recommendation: Decrease Dose - Reduce dose to 12.5 mg daily   Status: Accepted per CPA         Neuropathy    Current Medication: pregabalin (LYRICA) 75 MG capsule   Rationale: Dose too low - Dosage too low - Effectiveness   Recommendation: Increase Dose - Increase to 75 mg twice daily   Status: Accepted per CPA         Pain    Current Medication: pregabalin (LYRICA) 75 MG capsule   Rationale: Synergistic therapy - Needs additional medication therapy - Indication   Recommendation: Start Medication - Start acetaminophen for muscle aches and pains   Status: Patient Agreed - Adherence/Education

## 2023-08-17 RX ORDER — SENNOSIDES 8.6 MG
650 CAPSULE ORAL EVERY 8 HOURS PRN
COMMUNITY

## 2023-08-17 NOTE — PATIENT INSTRUCTIONS
"Recommendations from MTM Pharmacist visit:                                                    MTM (medication therapy management) is a service provided by a clinical pharmacist designed to help you get the most of out of your medicines.  You may be sent a phone or email survey evaluating today's visit.  Please provide feedback you have for the service he received today if you are able.    1.  Start alfuzosin to replace tamsulosin   2.  Reduce losartan to 1/2 tablet daily   3.  Continue pregabalin twice daily  4.  Take acetaminophen ER 2 tabs every 8 hours as needed for any muscle pain in addition to neuropathy        Follow-up: 9/26 at 9:30 am with Nilsa Mata, Medication Therapy Management Pharmacist at [facility].      It was great speaking with you today.  I value your experience and would be very thankful for your time in providing feedback in our clinic survey. In the next few days, you may receive an email or text message from [employer] with a link to a survey related to your  clinical pharmacist.\"     To schedule another MTM appointment, please call the clinic directly at [phone]. You may also schedule with me at the clinic .     My Clinical Pharmacist's contact information:                                                      Please feel free to contact me with any questions or concerns you have.      Nilsa Maat, Pharm D., MPH  MTM Pharmacist - [facility]  Secure Voicemail: [phone]  Days in the office: Monday - Thursday          "

## 2023-08-24 ENCOUNTER — OFFICE VISIT (OUTPATIENT)
Dept: CARDIOLOGY | Facility: CLINIC | Age: 68
End: 2023-08-24
Payer: COMMERCIAL

## 2023-08-24 ENCOUNTER — TELEPHONE (OUTPATIENT)
Dept: CARDIOLOGY | Facility: CLINIC | Age: 68
End: 2023-08-24

## 2023-08-24 VITALS
WEIGHT: 315 LBS | DIASTOLIC BLOOD PRESSURE: 60 MMHG | SYSTOLIC BLOOD PRESSURE: 118 MMHG | RESPIRATION RATE: 14 BRPM | BODY MASS INDEX: 43.65 KG/M2 | HEART RATE: 76 BPM

## 2023-08-24 DIAGNOSIS — I48.4 ATYPICAL ATRIAL FLUTTER (H): ICD-10-CM

## 2023-08-24 DIAGNOSIS — I10 ESSENTIAL HYPERTENSION: ICD-10-CM

## 2023-08-24 DIAGNOSIS — I48.19 PERSISTENT ATRIAL FIBRILLATION (H): Primary | ICD-10-CM

## 2023-08-24 DIAGNOSIS — Z95.818 PRESENCE OF WATCHMAN LEFT ATRIAL APPENDAGE CLOSURE DEVICE: ICD-10-CM

## 2023-08-24 DIAGNOSIS — G47.33 OSA ON CPAP: ICD-10-CM

## 2023-08-24 PROCEDURE — 99214 OFFICE O/P EST MOD 30 MIN: CPT | Performed by: NURSE PRACTITIONER

## 2023-08-24 RX ORDER — CYANOCOBALAMIN 1000 UG/ML
1 INJECTION, SOLUTION INTRAMUSCULAR; SUBCUTANEOUS
COMMUNITY

## 2023-08-24 NOTE — TELEPHONE ENCOUNTER
MODIFIED MEETA SCALE   Timepoint: 4-6 wk Post-LAAC    Previous score: 0    Score Description   0 No symptoms at all   1 No significant disability despite symptoms; able to carry out all usual duties and activities   2 Slight disability; unable to carry out all previous activities, but able to look after own affairs without assistance   3 Moderate disability; requiring some help, but able to walk without assistance   4 Moderately severe disability; unable to walk without assistance and unable to attend to own bodily needs without assistance   5 Severe disability; bedridden, incontinent and requiring constant nursing care and attention   6 Dead    Total score (0 - 6):  0    Change in score if s/p LAAC? No  If yes, notify implanting cardiologist.    Dejah Hurtado RN BSN  Structural Heart Coordinator   [facility]  [phone]

## 2023-08-24 NOTE — PATIENT INSTRUCTIONS
Khari Jamison,    It was a pleasure speaking with you today in the clinic     Stop taking losartan.  Monitor blood pressure, if consistently high, follow up with PCP     Decrease soda/pop intake to 3 cans per day    IMPORTANT INFORMATION REGARDING YOUR      Transesophageal Echocardiogram (AMANDEEP) and Left Atrial Appendage Occlusion Device Implant    Transesophageal Echocardiogram (AMANDEEP) : 11/8/2023    Have nothing to eat or drink after midnight the night prior to your procedure.   Please take all of your medications with a small sip of water prior to coming in for you procedure, with the exception of any vitamins/minerals, diabetic agents or diuretics.   You will require a responsible  to bring you home following this procedure.  You will stay on aspirin 81 mg daily and Plavix 75 mg daily until 1/27/2024, then stop taking Plavix.  Continue aspirin 81 mg daily indefinately    Parking information:   Please arrive at [facility], Located at: [address] [place] [address]  Please park in Lot A if open (Lot B is overflow for patients).  Please check in at the main hospital desk at [facility]   From there you will be directed downstairs to the radiology deck where you will check in with heart care for your procedure.     - if you have questions, please call:    Sobia Montes RN @ [phone] or Dejah Hurtado RN @ [phone]    One of our nurses will call you in January for a 6-month follow-up phone call and to remind you to stop your Plavix.     Follow up with me in 1 year, or sooner if needed        After hours/scheduling line  [phone]

## 2023-08-24 NOTE — LETTER
8/24/2023    Shi Sneed MD  [address] [facility] [address]    RE: Khari Jamison       Dear Colleague,     I had the pleasure of seeing Khari Jamison in the [facility].    HEART CARE ENCOUNTER CONSULTATON NOTE      [facility]  [phone]      Assessment/Recommendations   Assessment/Plan:  1.  Persistent Atrial Fibrillation: Brief palpitations, but no sustained arrhythmia.  Remains off membrane active antiarrhythmic medications.  Strongly encouraged to decrease his soda consumption.    He has a [id number] score of 3 for age 65-74, HTN, DM.   HAS-BLED score of 2 for age and bleeding predisposition.   He underwent left atrial appendage occlusion with the Watchman device on 7/27/2023.  He has had an uneventful recovery, no neurologic symptoms or events.    Continue aspirin 81 mg daily with clopidogrel 75 mg daily.  3-month post-plan AMANDEEP on 11/8/2023.  Continue DAPT for 6 months; continue clopidogrel until January 27, 2024, then discontinue.  Continue aspirin 81 mg daily indefinitely.    2.  Hypertension: Blood pressure running on the lower side associated with lightheadedness.  Discontinue losartan.  Monitor blood pressure and follow-up with PCP if elevated.    3.  Obstructive sleep apnea: Consistent use of CPAP nightly.  Correlation between untreated sleep apnea and atrial arrhythmias.  Encouraged compliance with CPAP therapy.    Follow up in 1 year     History of Present Illness/Subjective    HPI: Khari Jamison is a 68 year old male who comes in today for follow up of left atrial appendage closure with the Watchman device.  He has a history of persistent atrial fibrillation and flutter, hypertension, hyperlipidemia with statin intolerance, type 2 diabetes, obstructive sleep apnea on CPAP, chronic back pain, and obesity.  COVID infection with slow recovery.    Arrhythmia history  Dx/date: Long history of persistent atrial fibrillation and flutter with  multiple cardioversions prior to first ablation in 2014.  Recurrence of atypical atrial flutter 9/8/2015.  Recurrence of atypical atrial flutter 5/16/2016 likely triggered by URI, requiring DCCV.  He has brief episodes of palpitations 1-2 times a month, but for only a few minutes.  He remains off AAD.  Sx: Palpitations, dyspnea on exertion, fatigue, occasionally lightheadedness  [id number] score: 3 for age 65-74, HTN, DM.  HAS-BLED score: 2 for age, easy bruising/bleeding  Oral anticoagulation: Eliquis with easy bruising/bleeding.  S/p left atrial appendage closure with a 31 mm Watchman FLX 7/27/2023  Antiarrhythmic medications, AV heidi blocking agents: Sotalol and amiodarone prior to ablation.  Presently off AAD.  Procedures  DCCV: Multiple, most recently 9/9/2015, 5/16/2016  Ablation: 9/4/2014 (cryo-PVI+roof+SAMUEL), 1/27/2016 (CFE+ RA CTI)    He states that he feels well, except that he gets episodes of lightheadedness.  He reports low blood pressures.  He has brief episodes of a fast heart rate occurring almost every night, but only lasting for 10 to 15 seconds.  He has not had any sustained palpitations.  He reports an uneventful recovery from his procedure with no groin site issues or neurologic changes.  He denies chest discomfort, sustained palpitations, abdominal fullness/bloating or peripheral edema, shortness of breath, paroxysmal nocturnal dyspnea, orthopnea, dizziness, pre-syncope, or syncope.    Cardiographics (EKG personally reviewed):  EKG done 7/24/2023 shows sinus rhythm at 85 bpm, ACs, QRS 92 ms, QT/QTc 378/449 ms    Transesophageal echo done 1/27/2023:  Pre-Device:  1. Normal left ventricular size and systolic function. LVEF:60%  2. No left atrial thrombus or spontaneous contrast. Mild to moderate left  atrial enlargement.  3. No ASD or PFO by color flow.  4. No pericardial effusion.     Post Device:  1. Well-seated PINNACLE FLXÂ  Device in left atrial appendage by 2D and 3D  imaging. No color  doppler evidence of flow around device at ostium insertion  before or after device release. No change in mitral valve function. No  thrombus noted on device, LA or CALLY.     CALLY device measurements:  Device compression diameter:  Pre-deployment.  0Â : 22.8 mm  45Â : 22.3 mm  90Â : 18.2 mm  135 Â : 21.7 mm     Post-deployment  0Â : 21.2 mm  45Â : 23.5 mm  90Â : 23.4 mm  135 Â : 22.3 mm     2. Normal left ventricular size and systolic function. LVEF:60%  3. Small ASD with unidirectional flow (left to right) bycolor flow doppler.  4. No post procedural pericardial effusion.    CTA pulmonary veins done 6/7/2023:  The following measurements were made of the left atrial appendage at 35% cardiac phase at the level of the takeoff of the left circumflex artery:  Orifice diameter: 25 x 20 mm (average 22 mm)  Orifice circumference: 72 mm  Orifice area: 3.87 cm   Useful depth: 19 mm  Maximum depth: 46 mm  2. No thrombus in the left atrium or left atrial appendage.  3. Minimal nonobstructive atherosclerotic coronary artery disease.    I have reviewed and updated the patient's Past Medical History, Social History, Family History and Medication List.     Physical Examination  Review of Systems   Vitals: /60 (BP Location: Left arm, Patient Position: Sitting, Cuff Size: Adult Large)   Pulse 76   Resp 14   Wt (!) 154.2 kg (340 lb)   BMI 43.65 kg/m    BMI= Body mass index is 43.65 kg/m .  Wt Readings from Last 3 Encounters:   08/24/23 (!) 154.2 kg (340 lb)   08/14/23 (!) 151 kg (332 lb 12.8 oz)   07/27/23 (!) 153.8 kg (339 lb)       General Appearance:   Alert, well-appearing and in no acute distress.   HEENT: Atraumatic, normocephalic.  No scleral icterus, normal conjunctivae, EOMs intact, PERRL.  Mucous membranes pink and moist.     Chest/Lungs:   Chest symmetric, spine straight.  Respirations unlabored.  Lungs are clear to auscultation.   Cardiovascular:   Regular rate and rhythm.  Normal first and second heart sounds with  no murmurs, rubs, or gallops; radial and posterior tibial pulses are intact, No edema.   Abdomen:  Soft, nondistended, bowel sounds present.   Extremities: No cyanosis or clubbing.   Musculoskeletal: Moves all extremities.    Skin: Warm, dry, intact.    Neurologic: Mood and affect are appropriate.  Alert and oriented to person, place, time, and situation.     ROS: 10 point ROS neg other than the symptoms noted above in the HPI.         Medical History  Surgical History Family History Social History   Past Medical History:   Diagnosis Date    Arrhythmia     a-fib    Atrial fibrillation (H)     Back pain     Back pain     Diabetes mellitus (H)     Hyperlipemia     Kidney stone     Obesity     GASTON (obstructive sleep apnea)     CPAP    PONV (postoperative nausea and vomiting)     postoperatively with oral pain meds    Status post ablation of atrial fibrillation 1/27/2016    PVI Sept 2014 (cryo-PVI + roof line + SAMUEL line)     Past Surgical History:   Procedure Laterality Date    CARDIAC ELECTROPHYSIOLOGY MAPPING AND ABLATION  1/27/16    pvi    CARDIOVERSION  10/31/14    CARDIOVERSION  05/16/2016    COLONOSCOPY N/A 11/18/2015    Procedure: COLONOSCOPY WITH POLYPECTOMY USING BIOPSY FORCEP;  Surgeon: Coco Vallecillo MD;  Location: [facility] GI;  Service:     COMBINED CYSTOSCOPY, INSERT STENT URETER(S) Bilateral 8/20/2015    Procedure: CYSTOSCOPY, BILATERAL STENT REMOVAL, LEFT URETEROSCOPY, STONE EXTRACTION, LEFT STENT INSERTION;  Surgeon: Ansley Jeffery MD;  Location: [facility] OR;  Service:     CV LEFT ATRIAL APPENDAGE CLOSURE Right 7/27/2023    Procedure: Left Atrial Appendage Closure;  Surgeon: Judy Tapia MD;  Location: [facility] CATH LAB CV    CYSTOSCOPY W/ LASER LITHOTRIPSY  aug 2015    OTHER SURGICAL HISTORY  9/4/14    pulmonary vein isolation    OTHER SURGICAL HISTORY      eyelid lift    IL ABLATE HEART DYSRHYTHM FOCUS      Description: Catheter Ablation Atrial Fibrillation;  Recorded: 10/08/2014;   Comments: 9/4/14 PVI Cyro to 4 PVs; Roof and SAMUEL lines done.    UT CARDIOVERSION ELECTIVE ARRHYTHMIA EXTERNAL  09/11/2015    Description: Elective Cardioversion External;  Recorded: 03/06/2014;  Comments: 10/11/13; ; 11/27/13 and reverted to afib after 11 days on mod dose sotalol.; ; 1/3/14  sinus on Amio    UT CARDIOVERSION ELECTIVE ARRHYTHMIA EXTERNAL      Description: Elective Cardioversion External;  Recorded: 11/19/2014;  Comments: 10/11/13; ; 11/27/13 and reverted to afib after 11 days on mod dose sotalol.; ; 1/3/14  sinus on Amio.  10/31/14    UT CYSTO/URETERO W/LITHOTRIPSY &INDWELL STENT INSRT Right 7/16/2019    Procedure: CYSTOSCOPY RIGHT URETEROSCOPY, LASER  LITHOTRIPSY STENT INSERTION;  Surgeon: Kingston Kurtz MD;  Location: [facility];  Service: Urology    REMOVAL OF SPERM DUCT(S)      Description: Surgery Of Male Genitalia Vasectomy;  Recorded: 10/29/2013;    TONSILLECTOMY      VASECTOMY       Family History   Problem Relation Age of Onset    Cancer Mother         uterine    Diabetes Maternal Aunt     Urolithiasis No family hx of     Clotting Disorder No family hx of     Gout No family hx of     Heart Disease No family hx of     Anesthesia Reaction No family hx of         Social History     Socioeconomic History    Marital status:      Spouse name: Not on file    Number of children: Not on file    Years of education: Not on file    Highest education level: Not on file   Occupational History    Not on file   Tobacco Use    Smoking status: Never    Smokeless tobacco: Never   Substance and Sexual Activity    Alcohol use: No     Alcohol/week: 1.0 standard drink of alcohol    Drug use: No    Sexual activity: Not on file   Other Topics Concern    Not on file   Social History Narrative    Not on file     Social Determinants of Health     Financial Resource Strain: Not on file   Food Insecurity: Not on file   Transportation Needs: Not on file   Physical Activity: Not on file   Stress: Not  on file   Social Connections: Not on file   Intimate Partner Violence: Not on file   Housing Stability: Not on file           Medications  Allergies   Current Outpatient Medications   Medication Sig Dispense Refill    acetaminophen (TYLENOL) 650 MG CR tablet Take 650 mg by mouth every 8 hours as needed for mild pain or fever      alfuzosin ER (UROXATRAL) 10 MG 24 hr tablet Take 10 mg by mouth daily      aspirin 81 MG EC tablet Take 1 tablet (81 mg) by mouth daily      clopidogrel (PLAVIX) 75 MG tablet Take 1 tablet (75 mg) by mouth daily 90 tablet 1    Continuous Blood Gluc Sensor (FREESTYLE OLEGARIO 2 SENSOR) MISC 1 each every 14 days Change every 14 days.      cyanocobalamin (CYANOCOBALAMIN) 1000 MCG/ML injection Inject 1 mL into the muscle every 30 days      levothyroxine (SYNTHROID, LEVOTHROID) 88 MCG tablet Take 88 mcg by mouth At Bedtime      metFORMIN (GLUCOPHAGE XR) 500 MG 24 hr tablet Take 1,000 mg by mouth 2 times daily (with meals)      pregabalin (LYRICA) 75 MG capsule Take 75 mg by mouth 2 times daily      rosuvastatin (CRESTOR) 5 MG tablet Take 5 mg by mouth every other day      semaglutide (OZEMPIC, 0.25 OR 0.5 MG/DOSE,) 2 MG/3ML pen Inject 0.5 mg Subcutaneous every 7 days      Vitamin D3 (VITAMIN D, CHOLECALCIFEROL,) 25 mcg (1000 units) tablet Take 1 tablet by mouth every morning         Allergies   Allergen Reactions    Ezetimibe GI Disturbance    Simvastatin      Other reaction(s): myalgias, arthralgias          Lab Results    Chemistry/lipid CBC Cardiac Enzymes/BNP/TSH/INR   Recent Labs   Lab Test 03/01/23  1319   CHOL 286*   HDL 43   *   TRIG 187*     Recent Labs   Lab Test 03/01/23  1319 01/04/22  0940 10/20/21  1605   * 143* 172*     Recent Labs   Lab Test 07/27/23  0956 07/27/23  0955 07/27/23  0557 07/24/23  1032   NA  --   --   --  141   POTASSIUM  --   --   --  4.6   CHLORIDE  --   --   --  103   CO2  --   --   --  27   GLC  --  159*   < > 160*   BUN  --   --   --  13.7   CR 1.05   --   --  1.08   GFRESTIMATED 77  --   --  75   ASHLEIGH  --   --   --  9.6    < > = values in this interval not displayed.     Recent Labs   Lab Test 07/27/23  0956 07/24/23  1032 07/18/23  1509   CR 1.05 1.08 1.04     Recent Labs   Lab Test 05/08/23  0425   A1C 6.5*    Recent Labs   Lab Test 07/27/23 0956 07/24/23  1032   WBC  --  8.0   HGB 14.0 15.2   HCT  --  45.6   MCV  --  90   PLT  --  198     Recent Labs   Lab Test 07/27/23 0956 07/24/23  1032 05/07/23  1944   HGB 14.0 15.2 14.7    No results for input(s): TROPONINI in the last 60346 hours.  Recent Labs   Lab Test 05/07/23  1944   *     Recent Labs   Lab Test 07/18/23  1509   TSH 1.97     Recent Labs   Lab Test 05/08/23  0421   INR 1.06                  Thank you for allowing me to participate in the care of your patient.      Sincerely,     MAGDA Thakkar [facility]  cc:   No referring provider defined for this encounter.

## 2023-08-24 NOTE — PROGRESS NOTES
HEART CARE ENCOUNTER CONSULTATON NOTE      [facility]  [phone]      Assessment/Recommendations   Assessment/Plan:  1.  Persistent Atrial Fibrillation: Brief palpitations, but no sustained arrhythmia.  Remains off membrane active antiarrhythmic medications.  Strongly encouraged to decrease his soda consumption.    He has a CLF3TL1-OECn score of 3 for age 65-74, HTN, DM.   HAS-BLED score of 2 for age and bleeding predisposition.   He underwent left atrial appendage occlusion with the Watchman device on 7/27/2023.  He has had an uneventful recovery, no neurologic symptoms or events.    Continue aspirin 81 mg daily with clopidogrel 75 mg daily.  3-month post-plan AMANDEEP on 11/8/2023.  Continue DAPT for 6 months; continue clopidogrel until January 27, 2024, then discontinue.  Continue aspirin 81 mg daily indefinitely.    2.  Hypertension: Blood pressure running on the lower side associated with lightheadedness.  Discontinue losartan.  Monitor blood pressure and follow-up with PCP if elevated.    3.  Obstructive sleep apnea: Consistent use of CPAP nightly.  Correlation between untreated sleep apnea and atrial arrhythmias.  Encouraged compliance with CPAP therapy.    Follow up in 1 year     History of Present Illness/Subjective    HPI: Khari Jamison is a 68 year old male who comes in today for follow up of left atrial appendage closure with the Watchman device.  He has a history of persistent atrial fibrillation and flutter, hypertension, hyperlipidemia with statin intolerance, type 2 diabetes, obstructive sleep apnea on CPAP, chronic back pain, and obesity.  COVID infection with slow recovery.    Arrhythmia history  Dx/date: Long history of persistent atrial fibrillation and flutter with multiple cardioversions prior to first ablation in 2014.  Recurrence of atypical atrial flutter 9/8/2015.  Recurrence of atypical atrial flutter 5/16/2016 likely triggered by URI, requiring DCCV.  He has brief  episodes of palpitations 1-2 times a month, but for only a few minutes.  He remains off AAD.  Sx: Palpitations, dyspnea on exertion, fatigue, occasionally lightheadedness  [id number]-ODDc score: 3 for age 65-74, HTN, DM.  HAS-BLED score: 2 for age, easy bruising/bleeding  Oral anticoagulation: Eliquis with easy bruising/bleeding.  S/p left atrial appendage closure with a 31 mm Watchman FLX 7/27/2023  Antiarrhythmic medications, AV heidi blocking agents: Sotalol and amiodarone prior to ablation.  Presently off AAD.  Procedures  DCCV: Multiple, most recently 9/9/2015, 5/16/2016  Ablation: 9/4/2014 (cryo-PVI+roof+SAMUEL), 1/27/2016 (CFE+ RA CTI)    He states that he feels well, except that he gets episodes of lightheadedness.  He reports low blood pressures.  He has brief episodes of a fast heart rate occurring almost every night, but only lasting for 10 to 15 seconds.  He has not had any sustained palpitations.  He reports an uneventful recovery from his procedure with no groin site issues or neurologic changes.  He denies chest discomfort, sustained palpitations, abdominal fullness/bloating or peripheral edema, shortness of breath, paroxysmal nocturnal dyspnea, orthopnea, dizziness, pre-syncope, or syncope.    Cardiographics (EKG personally reviewed):  EKG done 7/24/2023 shows sinus rhythm at 85 bpm, ACs, QRS 92 ms, QT/QTc 378/449 ms    Transesophageal echo done 1/27/2023:  Pre-Device:  1. Normal left ventricular size and systolic function. LVEF:60%  2. No left atrial thrombus or spontaneous contrast. Mild to moderate left  atrial enlargement.  3. No ASD or PFO by color flow.  4. No pericardial effusion.     Post Device:  1. Well-seated PINNACLE FLXÂ  Device in left atrial appendage by 2D and 3D  imaging. No color doppler evidence of flow around device at ostium insertion  before or after device release. No change in mitral valve function. No  thrombus noted on device, LA or CALLY.     CALLY device measurements:  Device  compression diameter:  Pre-deployment.  0Â : 22.8 mm  45Â : 22.3 mm  90Â : 18.2 mm  135 Â : 21.7 mm     Post-deployment  0Â : 21.2 mm  45Â : 23.5 mm  90Â : 23.4 mm  135 Â : 22.3 mm     2. Normal left ventricular size and systolic function. LVEF:60%  3. Small ASD with unidirectional flow (left to right) bycolor flow doppler.  4. No post procedural pericardial effusion.    CTA pulmonary veins done 6/7/2023:  The following measurements were made of the left atrial appendage at 35% cardiac phase at the level of the takeoff of the left circumflex artery:  Orifice diameter: 25 x 20 mm (average 22 mm)  Orifice circumference: 72 mm  Orifice area: 3.87 cm   Useful depth: 19 mm  Maximum depth: 46 mm  2. No thrombus in the left atrium or left atrial appendage.  3. Minimal nonobstructive atherosclerotic coronary artery disease.    I have reviewed and updated the patient's Past Medical History, Social History, Family History and Medication List.     Physical Examination  Review of Systems   Vitals: /60 (BP Location: Left arm, Patient Position: Sitting, Cuff Size: Adult Large)   Pulse 76   Resp 14   Wt (!) 154.2 kg (340 lb)   BMI 43.65 kg/m    BMI= Body mass index is 43.65 kg/m .  Wt Readings from Last 3 Encounters:   08/24/23 (!) 154.2 kg (340 lb)   08/14/23 (!) 151 kg (332 lb 12.8 oz)   07/27/23 (!) 153.8 kg (339 lb)       General Appearance:   Alert, well-appearing and in no acute distress.   HEENT: Atraumatic, normocephalic.  No scleral icterus, normal conjunctivae, EOMs intact, PERRL.  Mucous membranes pink and moist.     Chest/Lungs:   Chest symmetric, spine straight.  Respirations unlabored.  Lungs are clear to auscultation.   Cardiovascular:   Regular rate and rhythm.  Normal first and second heart sounds with no murmurs, rubs, or gallops; radial and posterior tibial pulses are intact, No edema.   Abdomen:  Soft, nondistended, bowel sounds present.   Extremities: No cyanosis or clubbing.   Musculoskeletal: Moves  all extremities.    Skin: Warm, dry, intact.    Neurologic: Mood and affect are appropriate.  Alert and oriented to person, place, time, and situation.     ROS: 10 point ROS neg other than the symptoms noted above in the HPI.         Medical History  Surgical History Family History Social History   Past Medical History:   Diagnosis Date    Arrhythmia     a-fib    Atrial fibrillation (H)     Back pain     Back pain     Diabetes mellitus (H)     Hyperlipemia     Kidney stone     Obesity     GASTON (obstructive sleep apnea)     CPAP    PONV (postoperative nausea and vomiting)     postoperatively with oral pain meds    Status post ablation of atrial fibrillation 1/27/2016    PVI Sept 2014 (cryo-PVI + roof line + SAMUEL line)     Past Surgical History:   Procedure Laterality Date    CARDIAC ELECTROPHYSIOLOGY MAPPING AND ABLATION  1/27/16    pvi    CARDIOVERSION  10/31/14    CARDIOVERSION  05/16/2016    COLONOSCOPY N/A 11/18/2015    Procedure: COLONOSCOPY WITH POLYPECTOMY USING BIOPSY FORCEP;  Surgeon: Coco Vallecillo MD;  Location: [facility] GI;  Service:     COMBINED CYSTOSCOPY, INSERT STENT URETER(S) Bilateral 8/20/2015    Procedure: CYSTOSCOPY, BILATERAL STENT REMOVAL, LEFT URETEROSCOPY, STONE EXTRACTION, LEFT STENT INSERTION;  Surgeon: Ansley Jeffery MD;  Location: [facility] OR;  Service:     CV LEFT ATRIAL APPENDAGE CLOSURE Right 7/27/2023    Procedure: Left Atrial Appendage Closure;  Surgeon: Judy Tapia MD;  Location: [facility] CATH LAB CV    CYSTOSCOPY W/ LASER LITHOTRIPSY  aug 2015    OTHER SURGICAL HISTORY  9/4/14    pulmonary vein isolation    OTHER SURGICAL HISTORY      eyelid lift    ID ABLATE HEART DYSRHYTHM FOCUS      Description: Catheter Ablation Atrial Fibrillation;  Recorded: 10/08/2014;  Comments: 9/4/14 PVI Cyro to 4 PVs; Roof and SAMUEL lines done.    ID CARDIOVERSION ELECTIVE ARRHYTHMIA EXTERNAL  09/11/2015    Description: Elective Cardioversion External;  Recorded: 03/06/2014;  Comments:  10/11/13; ; 11/27/13 and reverted to afib after 11 days on mod dose sotalol.; ; 1/3/14  sinus on Amio    AZ CARDIOVERSION ELECTIVE ARRHYTHMIA EXTERNAL      Description: Elective Cardioversion External;  Recorded: 11/19/2014;  Comments: 10/11/13; ; 11/27/13 and reverted to afib after 11 days on mod dose sotalol.; ; 1/3/14  sinus on Amio.  10/31/14    AZ CYSTO/URETERO W/LITHOTRIPSY &INDWELL STENT INSRT Right 7/16/2019    Procedure: CYSTOSCOPY RIGHT URETEROSCOPY, LASER  LITHOTRIPSY STENT INSERTION;  Surgeon: Kingston Kurtz MD;  Location: [facility];  Service: Urology    REMOVAL OF SPERM DUCT(S)      Description: Surgery Of Male Genitalia Vasectomy;  Recorded: 10/29/2013;    TONSILLECTOMY      VASECTOMY       Family History   Problem Relation Age of Onset    Cancer Mother         uterine    Diabetes Maternal Aunt     Urolithiasis No family hx of     Clotting Disorder No family hx of     Gout No family hx of     Heart Disease No family hx of     Anesthesia Reaction No family hx of         Social History     Socioeconomic History    Marital status:      Spouse name: Not on file    Number of children: Not on file    Years of education: Not on file    Highest education level: Not on file   Occupational History    Not on file   Tobacco Use    Smoking status: Never    Smokeless tobacco: Never   Substance and Sexual Activity    Alcohol use: No     Alcohol/week: 1.0 standard drink of alcohol    Drug use: No    Sexual activity: Not on file   Other Topics Concern    Not on file   Social History Narrative    Not on file     Social Determinants of Health     Financial Resource Strain: Not on file   Food Insecurity: Not on file   Transportation Needs: Not on file   Physical Activity: Not on file   Stress: Not on file   Social Connections: Not on file   Intimate Partner Violence: Not on file   Housing Stability: Not on file           Medications  Allergies   Current Outpatient Medications   Medication Sig Dispense  Refill    acetaminophen (TYLENOL) 650 MG CR tablet Take 650 mg by mouth every 8 hours as needed for mild pain or fever      alfuzosin ER (UROXATRAL) 10 MG 24 hr tablet Take 10 mg by mouth daily      aspirin 81 MG EC tablet Take 1 tablet (81 mg) by mouth daily      clopidogrel (PLAVIX) 75 MG tablet Take 1 tablet (75 mg) by mouth daily 90 tablet 1    Continuous Blood Gluc Sensor (FREESTYLE OLEGARIO 2 SENSOR) MISC 1 each every 14 days Change every 14 days.      cyanocobalamin (CYANOCOBALAMIN) 1000 MCG/ML injection Inject 1 mL into the muscle every 30 days      levothyroxine (SYNTHROID, LEVOTHROID) 88 MCG tablet Take 88 mcg by mouth At Bedtime      metFORMIN (GLUCOPHAGE XR) 500 MG 24 hr tablet Take 1,000 mg by mouth 2 times daily (with meals)      pregabalin (LYRICA) 75 MG capsule Take 75 mg by mouth 2 times daily      rosuvastatin (CRESTOR) 5 MG tablet Take 5 mg by mouth every other day      semaglutide (OZEMPIC, 0.25 OR 0.5 MG/DOSE,) 2 MG/3ML pen Inject 0.5 mg Subcutaneous every 7 days      Vitamin D3 (VITAMIN D, CHOLECALCIFEROL,) 25 mcg (1000 units) tablet Take 1 tablet by mouth every morning         Allergies   Allergen Reactions    Ezetimibe GI Disturbance    Simvastatin      Other reaction(s): myalgias, arthralgias          Lab Results    Chemistry/lipid CBC Cardiac Enzymes/BNP/TSH/INR   Recent Labs   Lab Test 03/01/23  1319   CHOL 286*   HDL 43   *   TRIG 187*     Recent Labs   Lab Test 03/01/23  1319 01/04/22  0940 10/20/21  1605   * 143* 172*     Recent Labs   Lab Test 07/27/23  0956 07/27/23  0955 07/27/23  0557 07/24/23  1032   NA  --   --   --  141   POTASSIUM  --   --   --  4.6   CHLORIDE  --   --   --  103   CO2  --   --   --  27   GLC  --  159*   < > 160*   BUN  --   --   --  13.7   CR 1.05  --   --  1.08   GFRESTIMATED 77  --   --  75   ASHLEIGH  --   --   --  9.6    < > = values in this interval not displayed.     Recent Labs   Lab Test 07/27/23  0956 07/24/23  1032 07/18/23  1509   CR 1.05 1.08  1.04     Recent Labs   Lab Test 05/08/23  0425   A1C 6.5*    Recent Labs   Lab Test 07/27/23  0956 07/24/23  1032   WBC  --  8.0   HGB 14.0 15.2   HCT  --  45.6   MCV  --  90   PLT  --  198     Recent Labs   Lab Test 07/27/23  0956 07/24/23  1032 05/07/23  1944   HGB 14.0 15.2 14.7    No results for input(s): TROPONINI in the last 14157 hours.  Recent Labs   Lab Test 05/07/23  1944   *     Recent Labs   Lab Test 07/18/23  1509   TSH 1.97     Recent Labs   Lab Test 05/08/23  0421   INR 1.06

## 2023-09-26 ENCOUNTER — APPOINTMENT (OUTPATIENT)
Dept: RADIOLOGY | Facility: CLINIC | Age: 68
End: 2023-09-26
Attending: STUDENT IN AN ORGANIZED HEALTH CARE EDUCATION/TRAINING PROGRAM
Payer: COMMERCIAL

## 2023-09-26 ENCOUNTER — OFFICE VISIT (OUTPATIENT)
Dept: PHARMACY | Facility: PHYSICIAN GROUP | Age: 68
End: 2023-09-26
Payer: COMMERCIAL

## 2023-09-26 ENCOUNTER — HOSPITAL ENCOUNTER (EMERGENCY)
Facility: CLINIC | Age: 68
Discharge: HOME | End: 2023-09-27
Attending: STUDENT IN AN ORGANIZED HEALTH CARE EDUCATION/TRAINING PROGRAM | Admitting: STUDENT IN AN ORGANIZED HEALTH CARE EDUCATION/TRAINING PROGRAM
Payer: COMMERCIAL

## 2023-09-26 VITALS
SYSTOLIC BLOOD PRESSURE: 122 MMHG | WEIGHT: 315 LBS | HEART RATE: 76 BPM | BODY MASS INDEX: 43.27 KG/M2 | DIASTOLIC BLOOD PRESSURE: 68 MMHG

## 2023-09-26 DIAGNOSIS — G62.9 NEUROPATHY: ICD-10-CM

## 2023-09-26 DIAGNOSIS — E11.9 TYPE 2 DIABETES MELLITUS WITHOUT COMPLICATION, WITHOUT LONG-TERM CURRENT USE OF INSULIN (H): Primary | ICD-10-CM

## 2023-09-26 DIAGNOSIS — I47.10 SVT (SUPRAVENTRICULAR TACHYCARDIA) (H): Primary | ICD-10-CM

## 2023-09-26 DIAGNOSIS — R39.14 BENIGN PROSTATIC HYPERPLASIA WITH INCOMPLETE BLADDER EMPTYING: ICD-10-CM

## 2023-09-26 DIAGNOSIS — N40.1 BENIGN PROSTATIC HYPERPLASIA WITH INCOMPLETE BLADDER EMPTYING: ICD-10-CM

## 2023-09-26 DIAGNOSIS — R52 PAIN: ICD-10-CM

## 2023-09-26 DIAGNOSIS — E66.01 MORBID OBESITY (H): ICD-10-CM

## 2023-09-26 DIAGNOSIS — J06.9 VIRAL URI WITH COUGH: ICD-10-CM

## 2023-09-26 DIAGNOSIS — I25.10 CORONARY ARTERY DISEASE INVOLVING NATIVE CORONARY ARTERY OF NATIVE HEART WITHOUT ANGINA PECTORIS: ICD-10-CM

## 2023-09-26 DIAGNOSIS — E83.42 HYPOMAGNESEMIA: ICD-10-CM

## 2023-09-26 LAB
ATRIAL RATE - MUSE: 91 BPM
BASOPHILS # BLD AUTO: 0.1 10E3/UL (ref 0–0.2)
BASOPHILS NFR BLD AUTO: 1 %
DIASTOLIC BLOOD PRESSURE - MUSE: 76 MMHG
EOSINOPHIL # BLD AUTO: 0.2 10E3/UL (ref 0–0.7)
EOSINOPHIL NFR BLD AUTO: 2 %
ERYTHROCYTE [DISTWIDTH] IN BLOOD BY AUTOMATED COUNT: 12.9 % (ref 10–15)
HCT VFR BLD AUTO: 42 % (ref 40–53)
HGB BLD-MCNC: 13.8 G/DL (ref 13.3–17.7)
IMM GRANULOCYTES # BLD: 0 10E3/UL
IMM GRANULOCYTES NFR BLD: 0 %
INTERPRETATION ECG - MUSE: NORMAL
LACTATE SERPL-SCNC: 1.9 MMOL/L (ref 0.7–2)
LYMPHOCYTES # BLD AUTO: 0.5 10E3/UL (ref 0.8–5.3)
LYMPHOCYTES NFR BLD AUTO: 5 %
MCH RBC QN AUTO: 29.6 PG (ref 26.5–33)
MCHC RBC AUTO-ENTMCNC: 32.9 G/DL (ref 31.5–36.5)
MCV RBC AUTO: 90 FL (ref 78–100)
MONOCYTES # BLD AUTO: 0.8 10E3/UL (ref 0–1.3)
MONOCYTES NFR BLD AUTO: 8 %
NEUTROPHILS # BLD AUTO: 8.3 10E3/UL (ref 1.6–8.3)
NEUTROPHILS NFR BLD AUTO: 84 %
NRBC # BLD AUTO: 0 10E3/UL
NRBC BLD AUTO-RTO: 0 /100
P AXIS - MUSE: 83 DEGREES
PLATELET # BLD AUTO: 198 10E3/UL (ref 150–450)
PR INTERVAL - MUSE: 146 MS
QRS DURATION - MUSE: 84 MS
QT - MUSE: 346 MS
QTC - MUSE: 510 MS
R AXIS - MUSE: 27 DEGREES
RBC # BLD AUTO: 4.67 10E6/UL (ref 4.4–5.9)
SYSTOLIC BLOOD PRESSURE - MUSE: 162 MMHG
T AXIS - MUSE: 60 DEGREES
VENTRICULAR RATE- MUSE: 131 BPM
WBC # BLD AUTO: 9.8 10E3/UL (ref 4–11)

## 2023-09-26 PROCEDURE — 83605 ASSAY OF LACTIC ACID: CPT | Performed by: STUDENT IN AN ORGANIZED HEALTH CARE EDUCATION/TRAINING PROGRAM

## 2023-09-26 PROCEDURE — 83735 ASSAY OF MAGNESIUM: CPT | Performed by: STUDENT IN AN ORGANIZED HEALTH CARE EDUCATION/TRAINING PROGRAM

## 2023-09-26 PROCEDURE — 96375 TX/PRO/DX INJ NEW DRUG ADDON: CPT

## 2023-09-26 PROCEDURE — 84484 ASSAY OF TROPONIN QUANT: CPT | Performed by: STUDENT IN AN ORGANIZED HEALTH CARE EDUCATION/TRAINING PROGRAM

## 2023-09-26 PROCEDURE — 99607 MTMS BY PHARM ADDL 15 MIN: CPT | Performed by: PHARMACIST

## 2023-09-26 PROCEDURE — 85025 COMPLETE CBC W/AUTO DIFF WBC: CPT | Performed by: STUDENT IN AN ORGANIZED HEALTH CARE EDUCATION/TRAINING PROGRAM

## 2023-09-26 PROCEDURE — 96365 THER/PROPH/DIAG IV INF INIT: CPT | Mod: 59

## 2023-09-26 PROCEDURE — 99606 MTMS BY PHARM EST 15 MIN: CPT | Performed by: PHARMACIST

## 2023-09-26 PROCEDURE — 83880 ASSAY OF NATRIURETIC PEPTIDE: CPT | Performed by: STUDENT IN AN ORGANIZED HEALTH CARE EDUCATION/TRAINING PROGRAM

## 2023-09-26 PROCEDURE — 99285 EMERGENCY DEPT VISIT HI MDM: CPT | Mod: 25

## 2023-09-26 PROCEDURE — 250N000013 HC RX MED GY IP 250 OP 250 PS 637: Performed by: STUDENT IN AN ORGANIZED HEALTH CARE EDUCATION/TRAINING PROGRAM

## 2023-09-26 PROCEDURE — 36415 COLL VENOUS BLD VENIPUNCTURE: CPT | Performed by: STUDENT IN AN ORGANIZED HEALTH CARE EDUCATION/TRAINING PROGRAM

## 2023-09-26 PROCEDURE — 96361 HYDRATE IV INFUSION ADD-ON: CPT

## 2023-09-26 PROCEDURE — 93005 ELECTROCARDIOGRAM TRACING: CPT | Performed by: STUDENT IN AN ORGANIZED HEALTH CARE EDUCATION/TRAINING PROGRAM

## 2023-09-26 PROCEDURE — 258N000003 HC RX IP 258 OP 636: Performed by: STUDENT IN AN ORGANIZED HEALTH CARE EDUCATION/TRAINING PROGRAM

## 2023-09-26 PROCEDURE — 84145 PROCALCITONIN (PCT): CPT | Performed by: STUDENT IN AN ORGANIZED HEALTH CARE EDUCATION/TRAINING PROGRAM

## 2023-09-26 PROCEDURE — 71045 X-RAY EXAM CHEST 1 VIEW: CPT

## 2023-09-26 PROCEDURE — 87637 SARSCOV2&INF A&B&RSV AMP PRB: CPT | Performed by: STUDENT IN AN ORGANIZED HEALTH CARE EDUCATION/TRAINING PROGRAM

## 2023-09-26 PROCEDURE — 80048 BASIC METABOLIC PNL TOTAL CA: CPT | Performed by: STUDENT IN AN ORGANIZED HEALTH CARE EDUCATION/TRAINING PROGRAM

## 2023-09-26 PROCEDURE — 250N000011 HC RX IP 250 OP 636: Mod: JZ | Performed by: STUDENT IN AN ORGANIZED HEALTH CARE EDUCATION/TRAINING PROGRAM

## 2023-09-26 RX ORDER — ACETAMINOPHEN 325 MG/1
975 TABLET ORAL ONCE
Status: COMPLETED | OUTPATIENT
Start: 2023-09-26 | End: 2023-09-26

## 2023-09-26 RX ORDER — ONDANSETRON 2 MG/ML
8 INJECTION INTRAMUSCULAR; INTRAVENOUS ONCE
Status: COMPLETED | OUTPATIENT
Start: 2023-09-26 | End: 2023-09-26

## 2023-09-26 RX ORDER — TRIAMCINOLONE ACETONIDE 55 UG/1
2 SPRAY, METERED NASAL DAILY PRN
COMMUNITY

## 2023-09-26 RX ORDER — DEXTROMETHORPHAN POLISTIREX 30 MG/5ML
60 SUSPENSION ORAL 2 TIMES DAILY
COMMUNITY
End: 2023-09-26

## 2023-09-26 RX ORDER — CETIRIZINE HYDROCHLORIDE 10 MG/1
10 TABLET ORAL DAILY PRN
COMMUNITY
End: 2023-11-15

## 2023-09-26 RX ORDER — DILTIAZEM HYDROCHLORIDE 5 MG/ML
25 INJECTION INTRAVENOUS ONCE
Status: DISCONTINUED | OUTPATIENT
Start: 2023-09-26 | End: 2023-09-26

## 2023-09-26 RX ORDER — DILTIAZEM HYDROCHLORIDE 5 MG/ML
20 INJECTION INTRAVENOUS ONCE
Status: COMPLETED | OUTPATIENT
Start: 2023-09-26 | End: 2023-09-26

## 2023-09-26 RX ADMIN — ACETAMINOPHEN 975 MG: 325 TABLET ORAL at 23:27

## 2023-09-26 RX ADMIN — SODIUM CHLORIDE 1000 ML: 9 INJECTION, SOLUTION INTRAVENOUS at 23:20

## 2023-09-26 RX ADMIN — DILTIAZEM HYDROCHLORIDE 20 MG: 5 INJECTION INTRAVENOUS at 23:24

## 2023-09-26 RX ADMIN — ONDANSETRON 8 MG: 2 INJECTION INTRAMUSCULAR; INTRAVENOUS at 23:22

## 2023-09-26 ASSESSMENT — ACTIVITIES OF DAILY LIVING (ADL): ADLS_ACUITY_SCORE: 35

## 2023-09-26 NOTE — PATIENT INSTRUCTIONS
"Recommendations from MTM Pharmacist visit:                                                    MTM (medication therapy management) is a service provided by a clinical pharmacist designed to help you get the most of out of your medicines.  You may be sent a phone or email survey evaluating today's visit.  Please provide feedback you have for the service he received today if you are able.      1.  Increase Ozempic to 0.5 mg daily  2.  Increase pregabalin to 100 mg twice daily  3.  Take acetaminophen ER 2 tabs every 8 hours as needed for any muscle pain in addition to neuropathy    Follow-up: 10/31 with Shi Sneed MD. November (Back to back visits) with Nilsa Mata, Medication Therapy Management pharmacist.  Bring Social Security Docs to this visit to renew Patient Assistance      It was great speaking with you today.  I value your experience and would be very thankful for your time in providing feedback in our clinic survey. In the next few days, you may receive an email or text message from [employer] with a link to a survey related to your  clinical pharmacist.\"     To schedule another MTM appointment, please call the clinic directly at [phone]. You may also schedule with me at the clinic .     My Clinical Pharmacist's contact information:                                                      Please feel free to contact me with any questions or concerns you have.      Nilsa Mata, Pharm D., MPH  MTM Pharmacist - [facility]  Secure Voicemail: [phone]  Days in the office: Monday - Thursday         "

## 2023-09-26 NOTE — PROGRESS NOTES
Medication Therapy Management (MTM) Encounter    ASSESSMENT:                            -----------------------------------------  .    Medication Adherence/Access: No issues identified.  -----------------------------------------    Afib/CAD: Blood pressure at goal less than 130/80 mmHg. Patient does not have strong indication for ARB other than cardiac remodeling (no microalbuminuria, no MI history).   Tolerating rosuvastatin well. Will recheck fasting lipids at upcoming visit.  .  -----------------------------------------  Type 2 Diabetes: A1c at goal less than 7%.  Metformin optimized at 2 g daily for macrovascular benefit.  Patient would benefit from optimizing GLP-1 for weight management and blood sugar control.  Recommend increasing Ozempic to 0.5 mg weekly -we will consider further increase at future visit for continued weight management benefit.  Did not have time to discuss replacing Eliquis with aspirin after Watchman procedure.  We will discuss at future visit.  Taking statin. Ok to continue to hold losartan (see above).    -----------------------------------------  BPH: stable.    -----------------------------------------    Neuropathy/Back Pain: Recommend increasing pregabalin as initially helpful at higher dose- follow up with PCP to determine if ortho referral needed. Acetaminophen safe and effective for muscle pain in addition to pregabalin for neuropathy.    PLAN:                            1.  Increase Ozempic to 0.5 mg daily  2.  Increase pregabalin to 100 mg twice daily  3.  Take acetaminophen ER 2 tabs every 8 hours as needed for any muscle pain in addition to neuropathy    Follow-up: 10/31 with Shi Sneed MD. November (Back to back visits) with Nilsa Mata, Medication Therapy Management pharmacist.  Bring Social Security Docs to this visit to renew Patient Assistance    SUBJECTIVE/OBJECTIVE:                          Khari Barbosapaul is a 67 year old male coming in for a  follow-up visit. Patient was accompanied by wife, Kenyetta. Today's visit is a follow-up [facility] visit from 8/14/23.     Reason for visit: Medication Therapy Management .    Allergies/ADRs: Reviewed in chart  Past Medical History: Reviewed in chart  Tobacco: He reports that he has never smoked. He has never used smokeless tobacco.  Alcohol: Less than 1 beverages / week  Caffeine: Not currently using    -----------------------------------------    Medication Adherence/Access: Patient receiving Ozempic from patient assistance program.  Eliquis was previously expensive, now had Watchman procedure -no longer needed.    -----------------------------------------    Afib/CAD:   Rosuvastatin 5 mg tablet every other day for weeks x 8 weeks    Patient had successful Watchman procedure about 4 weeks ago.  No myalgias since starting rosuvastatin. Cardiology stopped losartan due to hypotension.    Patient does not self-monitor blood pressure.    Lab Test 03/01/23  1319   CHOL 286*   HDL 43   *   TRIG 187*     BP Readings from Last 3 Encounters:   09/27/23 127/62   09/26/23 122/68   08/24/23 118/60       -----------------------------------------    Type 2 Diabetes:   Metformin 500 mg ER tablets -2 tablets twice daily  Ozempic 0.25 mg weekly - patient did not understand instructions to start Ozempic at 0.5 mg    Denies side effects since switching to Ozempic from Trulicity.  Psyllium helpful in forming stools. Finds Ozempic easier to use than thought at first -- would like to continue this as hopeful it will be better for weight loss.  Blood sugar monitoring: CGM    Continuous Glucose Monitoring Results:   Average Glucose Last 14 Days = 111 mg/dl    Time in Target Last 14 Days:  Above 180 mg/dL: 2%  In target  mg/dL: 98%  Below 70 mg/dL: 0%    Symptoms of low blood sugar? none  Symptoms of high blood sugar? none  Eye exam: up to date -February 2023  Foot exam: up to date -September 2022  Diet/Exercise: Patient works to  limit carbohydrates/sweets and eat healthfully.  Goal is to manage blood sugar and weight.  Aspirin: Not taking - prefers not to take   Statin: Rosuvastatin -denies side effects  ACEi/ARB: losartan. -not taking (see above)  Microalbumin (3/21/2023): Normal  A1C (7/18/2023): 6.1%            -----------------------------------------    BPH:   Afluzosin 10 mg ER - 1 daily     Patient notes improvement in urinary symptoms no retrograde ejaculation since switching from tamsulosin to afluzosin.  Patient symptoms include frequent urination, difficulty emptying bladder and interrupted void stream -all improved.      -----------------------------------------  .    Neuropathy/Back Pain:   Pregabalin 75 mg capsule - 1 capsule twice daily  Acetaminophen 650mg ER 2 tabs twice daily     Patient recently increased pregabalin to twice daily 1 month ago for worsening neuropathy. Felt improvement initially, now less effective. Acetaminophen regimen helpful for back pain overall, but has received cortisone injections in the past and wonders if this is necessary again.      Today's Vitals: /68   Pulse 76   Wt (!) 337 lb (152.9 kg)   BMI 43.27 kg/m    ----------------      I spent 30 minutes with this patient today. All changes were made via collaborative practice agreement with Shi Sneed MD. A copy of the visit note was provided to the patient's provider(s).    A summary of these recommendations was given to the patient.    Nilsa Mata, Pharm D., MPH  MTM Pharmacist - [facility]  Secure Voicemail: [phone]  In Office: Monday - Thursday         Medication Therapy Recommendations  Neuropathy    Current Medication: pregabalin (LYRICA) 100 MG capsule   Rationale: Dose too low - Dosage too low - Effectiveness   Recommendation: Increase Dose - increase from 75 mg to 100 mg BID   Status: Accepted per Provider         Type 2 diabetes mellitus without complication, without long-term current use of insulin (H)     Current Medication: semaglutide (OZEMPIC, 0.25 OR 0.5 MG/DOSE,) 2 MG/3ML pen   Rationale: Dose too low - Dosage too low - Effectiveness   Recommendation: Increase Dose - increase to 0.5 mg (clinical dose) daily   Status: Accepted per CPA

## 2023-09-27 VITALS
BODY MASS INDEX: 40.43 KG/M2 | RESPIRATION RATE: 19 BRPM | WEIGHT: 315 LBS | OXYGEN SATURATION: 95 % | DIASTOLIC BLOOD PRESSURE: 62 MMHG | HEART RATE: 62 BPM | SYSTOLIC BLOOD PRESSURE: 127 MMHG | TEMPERATURE: 98.2 F | HEIGHT: 74 IN

## 2023-09-27 LAB
ALBUMIN UR-MCNC: 20 MG/DL
ANION GAP SERPL CALCULATED.3IONS-SCNC: 13 MMOL/L (ref 7–15)
APPEARANCE UR: CLEAR
BILIRUB UR QL STRIP: NEGATIVE
BUN SERPL-MCNC: 12.3 MG/DL (ref 8–23)
CALCIUM SERPL-MCNC: 9.3 MG/DL (ref 8.8–10.2)
CHLORIDE SERPL-SCNC: 104 MMOL/L (ref 98–107)
COLOR UR AUTO: YELLOW
CREAT SERPL-MCNC: 0.92 MG/DL (ref 0.67–1.17)
DEPRECATED HCO3 PLAS-SCNC: 24 MMOL/L (ref 22–29)
EGFRCR SERPLBLD CKD-EPI 2021: >90 ML/MIN/1.73M2
FLUAV RNA SPEC QL NAA+PROBE: NEGATIVE
FLUBV RNA RESP QL NAA+PROBE: NEGATIVE
GLUCOSE SERPL-MCNC: 186 MG/DL (ref 70–99)
GLUCOSE UR STRIP-MCNC: 50 MG/DL
HGB UR QL STRIP: NEGATIVE
KETONES UR STRIP-MCNC: NEGATIVE MG/DL
LEUKOCYTE ESTERASE UR QL STRIP: NEGATIVE
MAGNESIUM SERPL-MCNC: 1.6 MG/DL (ref 1.7–2.3)
MUCOUS THREADS #/AREA URNS LPF: PRESENT /LPF
NITRATE UR QL: NEGATIVE
NT-PROBNP SERPL-MCNC: 741 PG/ML (ref 0–900)
PH UR STRIP: 5.5 [PH] (ref 5–7)
POTASSIUM SERPL-SCNC: 3.9 MMOL/L (ref 3.4–5.3)
PROCALCITONIN SERPL IA-MCNC: 0.06 NG/ML
RBC URINE: 1 /HPF
RSV RNA SPEC NAA+PROBE: NEGATIVE
SARS-COV-2 RNA RESP QL NAA+PROBE: NEGATIVE
SODIUM SERPL-SCNC: 141 MMOL/L (ref 135–145)
SP GR UR STRIP: 1.03 (ref 1–1.03)
TROPONIN T SERPL HS-MCNC: 39 NG/L
TROPONIN T SERPL HS-MCNC: 41 NG/L
UROBILINOGEN UR STRIP-MCNC: <2 MG/DL
WBC URINE: 1 /HPF

## 2023-09-27 PROCEDURE — 84484 ASSAY OF TROPONIN QUANT: CPT | Performed by: STUDENT IN AN ORGANIZED HEALTH CARE EDUCATION/TRAINING PROGRAM

## 2023-09-27 PROCEDURE — 96365 THER/PROPH/DIAG IV INF INIT: CPT | Mod: 59

## 2023-09-27 PROCEDURE — 36415 COLL VENOUS BLD VENIPUNCTURE: CPT | Performed by: STUDENT IN AN ORGANIZED HEALTH CARE EDUCATION/TRAINING PROGRAM

## 2023-09-27 PROCEDURE — 81001 URINALYSIS AUTO W/SCOPE: CPT | Performed by: STUDENT IN AN ORGANIZED HEALTH CARE EDUCATION/TRAINING PROGRAM

## 2023-09-27 PROCEDURE — 250N000011 HC RX IP 250 OP 636: Mod: JZ | Performed by: STUDENT IN AN ORGANIZED HEALTH CARE EDUCATION/TRAINING PROGRAM

## 2023-09-27 RX ORDER — DEXTROMETHORPHAN POLISTIREX 30 MG/5ML
60 SUSPENSION ORAL 2 TIMES DAILY
Qty: 148 ML | Refills: 0 | Status: SHIPPED | OUTPATIENT
Start: 2023-09-27 | End: 2023-11-15

## 2023-09-27 RX ORDER — MAGNESIUM SULFATE HEPTAHYDRATE 40 MG/ML
2 INJECTION, SOLUTION INTRAVENOUS ONCE
Status: COMPLETED | OUTPATIENT
Start: 2023-09-27 | End: 2023-09-27

## 2023-09-27 RX ORDER — MAGNESIUM OXIDE 400 MG/1
400 TABLET ORAL DAILY
Qty: 30 TABLET | Refills: 0 | Status: SHIPPED | OUTPATIENT
Start: 2023-09-27 | End: 2023-10-27

## 2023-09-27 RX ADMIN — MAGNESIUM SULFATE HEPTAHYDRATE 2 G: 40 INJECTION, SOLUTION INTRAVENOUS at 00:15

## 2023-09-27 ASSESSMENT — ACTIVITIES OF DAILY LIVING (ADL)
ADLS_ACUITY_SCORE: 35
ADLS_ACUITY_SCORE: 35

## 2023-09-27 NOTE — ED TRIAGE NOTES
"Pt arrives via [facility] from home. Pt reports he collapsed from weakness as he was getting up to go to the BR when he got to weak and \"found myself on the floor\"  Pt arrives diaphoretic and nauseated. Pt had an episode of emesis PTA. EMS noted that the pt had very short runs of SV[facility] enroute to [facility]  NO IV access. Pt denies nausea at this time. Unknown contact with any covid pt.     Triage Assessment       Row Name 09/26/23 [id number]       Triage Assessment (Adult)    Airway WDL WDL       Respiratory WDL    Respiratory WDL WDL       Skin Circulation/Temperature WDL    Skin Circulation/Temperature WDL X       Cardiac WDL    Cardiac WDL X;rhythm    Cardiac Rhythm Other (Comment)  intermmittent runs of V tach       Peripheral/Neurovascular WDL    Peripheral Neurovascular WDL WDL       Cognitive/Neuro/Behavioral WDL    Cognitive/Neuro/Behavioral WDL WDL                    "

## 2023-09-27 NOTE — ED PROVIDER NOTES
EMERGENCY DEPARTMENT ENCOUNTER       ED Course & Medical Decision Making     10:34 PM I met with the patient to gather history and to perform my initial exam. We discussed plans for the ED course, including diagnostic testing and treatment.     Final Impression  68 year old male brought in by EMS for evaluation of weakness, palpitations, diaphoresis, nausea, vomiting.  Patient reports that since 9/23 he has had a cough, some shortness of breath, generalized weakness after being exposed to some children who had URI symptoms.  Past medical history significant for atrial fibrillation s/p ablation and Watchman device placement.  EMS noted that patient had at least 6 runs of what appeared to be SVT while in route, each was relatively short-lived no more than 5-15 seconds in length before returning back to normal sinus rhythm.  Patient continued to have these brief episodes of what appears to be a narrow complex tachycardia most likely SVT while in the ED, though they seem to dissipate and almost completely resolve after getting fluids and some IV magnesium after his magnesium was found to be slightly low at 1.6.  Initial troponin 39, delta troponin 41 with patient rules out for ACS per algorithm.  After fluids and magnesium patient endorses feeling much improved, states he feels ready to go home.  Chest x-ray negative, COVID/flu/RSV negative.  White count normal.  Borderline though effectively normal procalcitonin.  Urinalysis normal.  Lactic acid and BNP normal.  Suspect patient may have been having episodes of SVT that may have been perceived as palpitations and caused his nausea, vomiting and diaphoresis.  Significantly improved after fluids and magnesium, will write 1 months worth of magnesium replacement.  Patient also asking for Delsym for cough, thus will be prescribed Delsym cough syrup.  Admission discussed and considered, though patient feeling much improved and ultimately declining admission, does seem  reasonable as the symptoms have resolved and is no longer having these brief runs appears to have been SVT.  Recommend follow-up as needed in the cardiology clinic.    Prior to making a final disposition on this patient the results of patient's tests and other diagnostic studies were discussed with the patient. All questions were answered. Patient expressed understanding of the plan and was amenable to it.    Medical Decision Making    History:  Supplemental history from: EMS  External Record(s) reviewed as documented below;  8/24/2023, [facility] cardiology clinic note, seen for follow-up of persistent atrial fibrillation, brief palpitations but no sustained arrhythmia, off antiarrhythmic medications.  On aspirin and Plavix after left atrial appendage closure on 7/27/2023.  Was previously on sotalol and amiodarone prior to ablation.    Work Up:  Chart documentation includes differential considered and any EKGs or imaging independently interpreted by provider, where specified.  DDx considered but not limited to: NSTEMI, SVT, A-fib with RVR, atrial flutter, COVID-19, viral URI, hypokalemia, hypomagnesemia  Considered administering antibiotics for: Pneumonia  Systemic symptoms of their presenting illness: Palpitations, diaphoresis, nausea, vomiting    Complicating factors:  Care impacted by chronic illness: Atrial fibrillation s/p Watchman device placement, DM-2, hyperlipidemia, hypertension, chronic back pain, obesity, GASTON  Care affected by social determinants of health: N/A    Disposition considerations: Discharge. I prescribed additional prescription strength medication(s) as charted. I considered admission, but discharged patient after significant clinical improvement.      Medications   acetaminophen (TYLENOL) tablet 975 mg (975 mg Oral $Given 9/26/23 5309)   sodium chloride 0.9% BOLUS 1,000 mL (0 mLs Intravenous Stopped 9/27/23 0114)   ondansetron (ZOFRAN) injection 8 mg (8 mg Intravenous $Given 9/26/23  2322)   diltiazem (CARDIZEM) injection 20 mg (20 mg Intravenous $Given 9/26/23 2324)   magnesium sulfate 2 g in 50 mL sterile water intermittent infusion (0 g Intravenous Stopped 9/27/23 0114)       New Prescriptions    DEXTROMETHORPHAN (DELSYM) 30 MG/5ML LIQUID    Take 10 mLs (60 mg) by mouth 2 times daily    MAGNESIUM OXIDE (MAG-OX) 400 MG TABLET    Take 1 tablet (400 mg) by mouth daily for 30 days       Final Impression     1. SVT (supraventricular tachycardia) (H)    2. Hypomagnesemia    3. Viral URI with cough        Chief Complaint     Chief Complaint   Patient presents with    Generalized Weakness    Palpitations    Nausea & Vomiting       HPI     Khari Jamison is a 68 year old male who presents for evaluation of weakness, palpitations, nausea, vomiting, and diaphoresis. Patient reports his symptoms initially began with weakness, cough, and shortness of breath after spending the weekend with children on 9/23. He states these initial symptoms felt like COVID-19. Tonight at 7:00 PM, the patient also developed symptoms of nausea and palpitations. He states he was feeling fine prior to sudden onset of these symptoms. Upon patient transfer to the ED via EMS tonight the patient began vomiting. He has a past medical history of atrial fibrillation, and has a watchman in place for this. Patient was also placed on Plavix until 01/27. He also endorses back pain, but states this symptom is unchanged from his baseline.    Of note, patient states he's been short of breath at baseline due to his past medical history of COVID-10. Patient does not recall when he last had COVID-19. He denies any recent sick contacts. Patient has not recently tested for COVID-19, though he would like to test for COVID-19 today in the ED. He states his wife is also presently sick with cold-like symptoms.    Patient denies abdominal pain and nausea at the time of ED evaluation today. No other medical concerns are expressed at this time.     I,  "Tabby Ambriz am serving as a scribe to document services personally performed by Dr. Khari Newton MD, based on my observation and the provider's statements to me. I, Dr. Khari Newton MD attest that Tabby Ambriz is acting in a scribe capacity, has observed my performance of the services and has documented them in accordance with my direction.    Physical Exam     /62   Pulse 62   Temp 98.2  F (36.8  C) (Oral)   Resp 19   Ht 1.88 m (6' 2\")   Wt (!) 153.8 kg (339 lb)   SpO2 95%   BMI 43.53 kg/m    Constitutional: Awake, alert, vomit on the front of his shirt  Head: Normocephalic, atraumatic.  ENT: Mucous membranes moist.  Eyes: Conjunctiva normal.  Respiratory: Respirations even, unlabored, in no acute respiratory distress.  Cardiovascular: Regular rate and rhythm, though has recurrent brief episodes of about 10 seconds of narrow complex tachycardia that appears to be SVT.  Good peripheral perfusion.   GI: Abdomen soft, non-tender.  Musculoskeletal: Moves all 4 extremities equally.  Integument: Diaphoretic  Neurologic: Alert & oriented x 3. Normal speech. Grossly normal motor and sensory function. No focal deficits noted.  Psychiatric: Normal mood    Labs & Imaging     Imaging reviewed and independently interpreted as below;   CXR images personally reviewed, no focal consolidations, no pneumothorax    Results for orders placed or performed during the hospital encounter of 09/26/23   XR Chest Port 1 View    Impression    IMPRESSION: Negative chest.   Symptomatic Influenza A/B, RSV, & SARS-CoV2 PCR (COVID-19) Nasopharyngeal    Specimen: Nasopharyngeal; Swab   Result Value Ref Range    Influenza A PCR Negative Negative    Influenza B PCR Negative Negative    RSV PCR Negative Negative    SARS CoV2 PCR Negative Negative   Troponin T, High Sensitivity (now)   Result Value Ref Range    Troponin T, High Sensitivity 39 (H) <=22 ng/L   N terminal pro BNP outpatient   Result Value Ref Range    N " Terminal Pro BNP Outpatient 741 0 - 900 pg/mL   Basic metabolic panel   Result Value Ref Range    Sodium 141 135 - 145 mmol/L    Potassium 3.9 3.4 - 5.3 mmol/L    Chloride 104 98 - 107 mmol/L    Carbon Dioxide (CO2) 24 22 - 29 mmol/L    Anion Gap 13 7 - 15 mmol/L    Urea Nitrogen 12.3 8.0 - 23.0 mg/dL    Creatinine 0.92 0.67 - 1.17 mg/dL    GFR Estimate >90 >60 mL/min/1.73m2    Calcium 9.3 8.8 - 10.2 mg/dL    Glucose 186 (H) 70 - 99 mg/dL   Result Value Ref Range    Procalcitonin 0.06 (H) <0.05 ng/mL   Lactic acid whole blood   Result Value Ref Range    Lactic Acid 1.9 0.7 - 2.0 mmol/L   Result Value Ref Range    Magnesium 1.6 (L) 1.7 - 2.3 mg/dL   CBC with platelets and differential   Result Value Ref Range    WBC Count 9.8 4.0 - 11.0 10e3/uL    RBC Count 4.67 4.40 - 5.90 10e6/uL    Hemoglobin 13.8 13.3 - 17.7 g/dL    Hematocrit 42.0 40.0 - 53.0 %    MCV 90 78 - 100 fL    MCH 29.6 26.5 - 33.0 pg    MCHC 32.9 31.5 - 36.5 g/dL    RDW 12.9 10.0 - 15.0 %    Platelet Count 198 150 - 450 10e3/uL    % Neutrophils 84 %    % Lymphocytes 5 %    % Monocytes 8 %    % Eosinophils 2 %    % Basophils 1 %    % Immature Granulocytes 0 %    NRBCs per 100 WBC 0 <1 /100    Absolute Neutrophils 8.3 1.6 - 8.3 10e3/uL    Absolute Lymphocytes 0.5 (L) 0.8 - 5.3 10e3/uL    Absolute Monocytes 0.8 0.0 - 1.3 10e3/uL    Absolute Eosinophils 0.2 0.0 - 0.7 10e3/uL    Absolute Basophils 0.1 0.0 - 0.2 10e3/uL    Absolute Immature Granulocytes 0.0 <=0.4 10e3/uL    Absolute NRBCs 0.0 10e3/uL   Troponin T, High Sensitivity (now)   Result Value Ref Range    Troponin T, High Sensitivity 41 (H) <=22 ng/L   UA with Microscopic reflex to Culture    Specimen: Urine, Clean Catch   Result Value Ref Range    Color Urine Yellow Colorless, Straw, Light Yellow, Yellow    Appearance Urine Clear Clear    Glucose Urine 50 (A) Negative mg/dL    Bilirubin Urine Negative Negative    Ketones Urine Negative Negative mg/dL    Specific Gravity Urine 1.029 1.001 - 1.030     Blood Urine Negative Negative    pH Urine 5.5 5.0 - 7.0    Protein Albumin Urine 20 (A) Negative mg/dL    Urobilinogen Urine <2.0 <2.0 mg/dL    Nitrite Urine Negative Negative    Leukocyte Esterase Urine Negative Negative    Mucus Urine Present (A) None Seen /LPF    RBC Urine 1 <=2 /HPF    WBC Urine 1 <=5 /HPF   ECG 12-LEAD WITH MUSE (LHE)   Result Value Ref Range    Systolic Blood Pressure 162 mmHg    Diastolic Blood Pressure 76 mmHg    Ventricular Rate 131 BPM    Atrial Rate 91 BPM    WV Interval 146 ms    QRS Duration 84 ms     ms    QTc 510 ms    P Axis 83 degrees    R AXIS 27 degrees    T Axis 60 degrees    Interpretation ECG       Sinus rhythm with Premature atrial complexes and Premature ventricular complexes or Fusion complexes  Otherwise normal ECG  When compared with ECG of 24-JUL-2023 10:19,  Fusion complexes are now Present  Premature ventricular complexes are now Present  Vent. rate has increased BY  46 BPM  Confirmed by SEE ED PROVIDER NOTE FOR, ECG INTERPRETATION ([id number]),  SANTY ROGEL ([id number]) on 9/26/2023 10:57:40 PM         EKG     EKG reviewed and independently interpreted as below;  Brief run of SVT followed by sinus rhythm, rate 131.  .  QRS 84.  QTc 510.  No STEMI.     Khari Newton MD  09/27/23 0328

## 2023-10-09 ENCOUNTER — TELEPHONE (OUTPATIENT)
Dept: CARDIOLOGY | Facility: CLINIC | Age: 68
End: 2023-10-09
Payer: COMMERCIAL

## 2023-10-26 DIAGNOSIS — Z95.818 PRESENCE OF WATCHMAN LEFT ATRIAL APPENDAGE CLOSURE DEVICE: ICD-10-CM

## 2023-10-26 RX ORDER — CLOPIDOGREL BISULFATE 75 MG/1
75 TABLET ORAL DAILY
Qty: 90 TABLET | Refills: 0 | Status: SHIPPED | OUTPATIENT
Start: 2023-10-26 | End: 2024-01-22

## 2023-10-30 ENCOUNTER — LAB REQUISITION (OUTPATIENT)
Dept: LAB | Facility: CLINIC | Age: 68
End: 2023-10-30

## 2023-10-30 ENCOUNTER — TRANSFERRED RECORDS (OUTPATIENT)
Dept: HEALTH INFORMATION MANAGEMENT | Facility: CLINIC | Age: 68
End: 2023-10-30
Payer: COMMERCIAL

## 2023-10-30 DIAGNOSIS — I10 ESSENTIAL (PRIMARY) HYPERTENSION: ICD-10-CM

## 2023-10-30 LAB
ANION GAP SERPL CALCULATED.3IONS-SCNC: 13 MMOL/L (ref 7–15)
BUN SERPL-MCNC: 19.3 MG/DL (ref 8–23)
CALCIUM SERPL-MCNC: 10 MG/DL (ref 8.8–10.2)
CHLORIDE SERPL-SCNC: 101 MMOL/L (ref 98–107)
CREAT SERPL-MCNC: 1.17 MG/DL (ref 0.67–1.17)
DEPRECATED HCO3 PLAS-SCNC: 25 MMOL/L (ref 22–29)
EGFRCR SERPLBLD CKD-EPI 2021: 68 ML/MIN/1.73M2
GLUCOSE SERPL-MCNC: 94 MG/DL (ref 70–99)
POTASSIUM SERPL-SCNC: 4.7 MMOL/L (ref 3.4–5.3)
SODIUM SERPL-SCNC: 139 MMOL/L (ref 135–145)

## 2023-10-30 PROCEDURE — 80048 BASIC METABOLIC PNL TOTAL CA: CPT | Performed by: FAMILY MEDICINE

## 2023-11-08 ENCOUNTER — HOSPITAL ENCOUNTER (OUTPATIENT)
Dept: CARDIOLOGY | Facility: HOSPITAL | Age: 68
Discharge: HOME | End: 2023-11-08
Attending: INTERNAL MEDICINE | Admitting: INTERNAL MEDICINE
Payer: COMMERCIAL

## 2023-11-08 VITALS
HEART RATE: 64 BPM | SYSTOLIC BLOOD PRESSURE: 155 MMHG | OXYGEN SATURATION: 95 % | BODY MASS INDEX: 40.43 KG/M2 | WEIGHT: 315 LBS | DIASTOLIC BLOOD PRESSURE: 96 MMHG | RESPIRATION RATE: 21 BRPM | HEIGHT: 74 IN | TEMPERATURE: 98.4 F

## 2023-11-08 DIAGNOSIS — Z95.818 PRESENCE OF WATCHMAN LEFT ATRIAL APPENDAGE CLOSURE DEVICE: ICD-10-CM

## 2023-11-08 DIAGNOSIS — I48.19 PERSISTENT ATRIAL FIBRILLATION (H): ICD-10-CM

## 2023-11-08 LAB — GLUCOSE BLDC GLUCOMTR-MCNC: 122 MG/DL (ref 70–99)

## 2023-11-08 PROCEDURE — 82962 GLUCOSE BLOOD TEST: CPT | Mod: GZ

## 2023-11-08 PROCEDURE — 93312 ECHO TRANSESOPHAGEAL: CPT | Mod: 26 | Performed by: INTERNAL MEDICINE

## 2023-11-08 PROCEDURE — 93320 DOPPLER ECHO COMPLETE: CPT | Mod: 26 | Performed by: INTERNAL MEDICINE

## 2023-11-08 PROCEDURE — 99152 MOD SED SAME PHYS/QHP 5/>YRS: CPT | Performed by: INTERNAL MEDICINE

## 2023-11-08 PROCEDURE — 250N000009 HC RX 250: Performed by: INTERNAL MEDICINE

## 2023-11-08 PROCEDURE — 250N000011 HC RX IP 250 OP 636: Performed by: INTERNAL MEDICINE

## 2023-11-08 PROCEDURE — 93325 DOPPLER ECHO COLOR FLOW MAPG: CPT

## 2023-11-08 PROCEDURE — 93312 ECHO TRANSESOPHAGEAL: CPT

## 2023-11-08 PROCEDURE — 93325 DOPPLER ECHO COLOR FLOW MAPG: CPT | Mod: 26 | Performed by: INTERNAL MEDICINE

## 2023-11-08 PROCEDURE — 258N000003 HC RX IP 258 OP 636: Performed by: INTERNAL MEDICINE

## 2023-11-08 RX ORDER — LIDOCAINE 40 MG/G
CREAM TOPICAL
Status: DISCONTINUED | OUTPATIENT
Start: 2023-11-08 | End: 2023-11-08 | Stop reason: HOSPADM

## 2023-11-08 RX ORDER — LIDOCAINE 40 MG/G
CREAM TOPICAL
Status: CANCELLED | OUTPATIENT
Start: 2023-11-08

## 2023-11-08 RX ORDER — FENTANYL CITRATE 50 UG/ML
INJECTION, SOLUTION INTRAMUSCULAR; INTRAVENOUS
Status: COMPLETED | OUTPATIENT
Start: 2023-11-08 | End: 2023-11-08

## 2023-11-08 RX ORDER — SODIUM CHLORIDE 9 MG/ML
INJECTION, SOLUTION INTRAVENOUS CONTINUOUS
Status: DISCONTINUED | OUTPATIENT
Start: 2023-11-08 | End: 2023-11-08 | Stop reason: HOSPADM

## 2023-11-08 RX ORDER — SODIUM CHLORIDE 9 MG/ML
INJECTION, SOLUTION INTRAVENOUS CONTINUOUS
Status: CANCELLED | OUTPATIENT
Start: 2023-11-08

## 2023-11-08 RX ORDER — LIDOCAINE HYDROCHLORIDE 20 MG/ML
SOLUTION OROPHARYNGEAL
Status: COMPLETED | OUTPATIENT
Start: 2023-11-08 | End: 2023-11-08

## 2023-11-08 RX ADMIN — TOPICAL ANESTHETIC 0.5 ML: 200 SPRAY DENTAL; PERIODONTAL at 08:31

## 2023-11-08 RX ADMIN — SODIUM CHLORIDE: 9 INJECTION, SOLUTION INTRAVENOUS at 08:20

## 2023-11-08 RX ADMIN — MIDAZOLAM 1 MG: 1 INJECTION INTRAMUSCULAR; INTRAVENOUS at 08:35

## 2023-11-08 RX ADMIN — FENTANYL CITRATE 100 MCG: 50 INJECTION INTRAMUSCULAR; INTRAVENOUS at 08:32

## 2023-11-08 RX ADMIN — MIDAZOLAM 0.5 MG: 1 INJECTION INTRAMUSCULAR; INTRAVENOUS at 08:45

## 2023-11-08 RX ADMIN — MIDAZOLAM 1 MG: 1 INJECTION INTRAMUSCULAR; INTRAVENOUS at 08:31

## 2023-11-08 RX ADMIN — LIDOCAINE HYDROCHLORIDE 10 ML: 20 SOLUTION ORAL; TOPICAL at 08:30

## 2023-11-08 ASSESSMENT — ACTIVITIES OF DAILY LIVING (ADL)
ADLS_ACUITY_SCORE: 35
ADLS_ACUITY_SCORE: 35

## 2023-11-08 NOTE — DISCHARGE INSTRUCTIONS
.1. You are required to have someone accompany you home.    2. Rest today. Do not drive or operate machinery today. Over-activity may produce nausea and dizziness.    3. You should follow your normal diet. Drink plenty of fluids. Do not drink any alcoholic beverages for 24 hours. *(Alcohol may interact with the medications you received today).    4. NO HOT FOODS or LIQUIDS FOR 6 HOURS after the procedure.  Nothing hot to eat or drink till after 2:55 PM today.    5. You may have a sore throat or cough. This is normal. These symptoms should resolve in 24 hours.     6. If you have further questions call your doctor: Zara

## 2023-11-08 NOTE — PRE-PROCEDURE
GENERAL PRE-PROCEDURE:   Procedure:  Transesophageal echocardiogram  Date/Time:  11/8/2023 8:26 AM    Written consent obtained?: Yes    Risks and benefits: Risks, benefits and alternatives were discussed    Consent given by:  Patient  Patient states understanding of procedure being performed: Yes    Patient's understanding of procedure matches consent: Yes    Procedure consent matches procedure scheduled: Yes    Expected level of sedation:  Moderate  Appropriately NPO:  Yes  Mallampati  :  Grade 1- soft palate, uvula, tonsillar pillars, and posterior pharyngeal wall visible  Lungs:  Lungs clear with good breath sounds bilaterally  Heart:  Normal heart sounds and rate  History & Physical reviewed:  History and physical reviewed and no updates needed  Statement of review:  I have reviewed the lab findings, diagnostic data, medications, and the plan for sedation

## 2023-11-08 NOTE — SEDATION DOCUMENTATION
Post sedation:  Discharge instructions reviewed with pt and wife.  Questions answered.  Pt alert and anxious for discharge.

## 2023-11-08 NOTE — SEDATION DOCUMENTATION
AMANDEEP:  Pt tolerated procedure well.  Received 2.5 mg IV versed and 100 mcg IV fentanyl. NPO till 9:55 am.  Nothing hot to eat or drink until 2:55 pm today. VSS.  HR:  58-70 SR  RR: 14-24.  Sats on 2LNC 96-99%,  Now on 94-98% on room air.  BP:  121-176/62-97.  Pt alert and talkative.  Denies back pain post procedure.   From: Lady Cruz  To: E-Visit Provider Group  Sent: 11/23/2021 12:54 PM CST  Subject: Request an E-Visit for Back pain    Request an E-Visit for Back pain  --------------------------------    Question: To help us route your E-Visit to the right provider, where is your current residence?  Answer: [place]    Question: Have you seen a health care provider for this complaint within the last 7 days?  Answer:  No    Question: Have you had any of the following exposures in the past 14 days?  Answer: Living in the same house as a person with a confirmed COVID-19 lab test    Question: Do you have any of the following symptoms?  Answer: None    Question: Have you traveled in the past 14 days?  Answer: No    Question: Where is your back pain located at: upper back, middle back, lower back, buttocks?  Answer: Lower Back    Question: Does the pain extend into your legs?  Answer: No    Question: How bad is the pain?  Answer: The pain is mild (1-3 rating scale)    Question: Did you have an injury that caused the pain?  Answer: No, I cannot remember an injury    Question: How long have you been having these symptoms?  Answer: More than four weeks    Question: Have you had back pain in the past?  Answer: Yes, I have many times had pain similiar to this before    Question: Please list any medications or treatments you have previously taken in PAST episodes of back pain.  Answer: None    Question: Please list any medications or treatments you have taken for THIS episode of back pain.   Answer: None, but it wouldn’t let me schedule the appt without selecting a problem but my problem wasn’t on the list. It’s not back pain, it’s my limbs that are falling asleep within a minute or two of being in any position other than standing. It is also painful when it happens.    Question: Do you have a fever?  Answer: No, I do not have a fever    Question: Do you have any of the following symptoms: areas that are numb or tingling, difficulty  in passing urine, fatigue, trouble controlling bladder or bowel movements, loss of appetite, unexpected weight loss, weakness in your legs or feet, falling or difficulty walking, night sweats?  Answer: Areas that are numb or tingling   Loss of appetite   Unexpected weight loss    Question: Have you been diagnosed with any of the following: cancer, arthritis, osteoporosis, kidney stones, pancreatitis?  Answer: No    Question: Do you have any chronic illnesses such as diabetes, heart disease, lung disease, or any illness that would weaken your ability to fight infection?  Answer: No    Question: Do you take any medications that can suppress the immune system (chemotherapy, steroids, transplant antirejection medications)?   Answer: No    Question: Have you ever had surgery on your back or spine?  Answer: No    Question: What movements make the pain worse: any movement, bending over, sitting down, standing or walking, strenuous activity?  Answer: Sitting down    Question: What makes the pain better: lying in bed, hot or cold compresses, sitting down, pain medication, stretching or moving, or does nothing make it better?  Answer: Stretching or moving    Question: Have you recently been hospitalized?  Answer: Yes    Question: Please enter a few details about where and when you were hospitalized.  Answer: [facility] in [facility] for a mental health crisis    Question: What is your usual health status (general activity level)?  Answer: I am active and can move normally    Question: Are you pregnant?  Answer: I am confident that I am not pregnant    Question: Do you need a work/school excuse letter?  Answer: No    Question: Is there anything else you would like to add?   Answer: No    Question: Thank you for completing this questionnaire. One or more of your responses may indicate that a face-to-face evaluation of these symptoms with a physician is warranted. If that is necessary, what is the  preferred telephone number that we can use to contact you?  Answer: [phone]

## 2023-11-09 ENCOUNTER — TELEPHONE (OUTPATIENT)
Dept: CARDIOLOGY | Facility: CLINIC | Age: 68
End: 2023-11-09
Payer: COMMERCIAL

## 2023-11-09 NOTE — TELEPHONE ENCOUNTER
"Patient s/p LAAC 7/27/2023. Per post-LAAC medication protocol, patient to remain on once daily 81 mg ASA for life and once daily 75 mg Plavix until 6 months post-LAAC, which will be 1/27/24. Post-LAAC AMANDEEP shows:    \"1. Normal left ventricular size and systolic performance with a visually  estimated ejection fraction of 60%.  2. No significant valvular heart disease is identified on this study.  3. Normal right ventricular size and systolic performance.  4. There is mild to moderate left atrial enlargement.  5. There is a left atrial appendage occlusion device. The device appears well-placed and seated.  Â  No thrombus is detected upon the surface of the device.  Â  No flow was detected around the device.  6. No residual postprocedural atrial communication is identified.  7. Echo contrast examination was performed using agitated NS as contrast  agent. The right heart opacity was good and the left heart was well  visualized. There was no evidence of right to left shunting during spontaneous respiration or following release of Valsalva.\"     for return call. [facility]  "

## 2023-11-09 NOTE — TELEPHONE ENCOUNTER
Phone call to patient, discussed results and recommendations. Patient verbalized they will remain on DAPT as prescribed. Patient will receive phone call from structural RN in January. No further questions at this time. JONATHAN

## 2023-11-15 ENCOUNTER — OFFICE VISIT (OUTPATIENT)
Dept: PHARMACY | Facility: PHYSICIAN GROUP | Age: 68
End: 2023-11-15
Payer: COMMERCIAL

## 2023-11-15 VITALS
HEART RATE: 75 BPM | BODY MASS INDEX: 42.97 KG/M2 | WEIGHT: 315 LBS | DIASTOLIC BLOOD PRESSURE: 60 MMHG | SYSTOLIC BLOOD PRESSURE: 102 MMHG

## 2023-11-15 DIAGNOSIS — I48.0 PAROXYSMAL ATRIAL FIBRILLATION (H): ICD-10-CM

## 2023-11-15 DIAGNOSIS — E11.9 TYPE 2 DIABETES MELLITUS WITHOUT COMPLICATION, WITHOUT LONG-TERM CURRENT USE OF INSULIN (H): Primary | ICD-10-CM

## 2023-11-15 DIAGNOSIS — E66.01 MORBID OBESITY (H): ICD-10-CM

## 2023-11-15 DIAGNOSIS — E03.9 ACQUIRED HYPOTHYROIDISM: ICD-10-CM

## 2023-11-15 DIAGNOSIS — I25.10 CORONARY ARTERY DISEASE INVOLVING NATIVE CORONARY ARTERY OF NATIVE HEART WITHOUT ANGINA PECTORIS: ICD-10-CM

## 2023-11-15 DIAGNOSIS — G62.9 NEUROPATHY: ICD-10-CM

## 2023-11-15 PROCEDURE — 99607 MTMS BY PHARM ADDL 15 MIN: CPT | Performed by: PHARMACIST

## 2023-11-15 PROCEDURE — 99606 MTMS BY PHARM EST 15 MIN: CPT | Performed by: PHARMACIST

## 2023-11-15 NOTE — PATIENT INSTRUCTIONS
"Recommendations from MTM Pharmacist visit:                                                    MTM (medication therapy management) is a service provided by a clinical pharmacist designed to help you get the most of out of your medicines.  You may be sent a phone or email survey evaluating today's visit.  Please provide feedback you have for the service he received today if you are able.    1.  Continue Ozempic to 0.5 mg daily  2.  Change pregabalin to 75 mg three times daily  3.  Contact cardiology to discuss your cold hands.    Follow-up: With Shi Sneed MD next available visit. In about 3-6 months with Nilsa Mata [facility]       It was great speaking with you today.  I value your experience and would be very thankful for your time in providing feedback in our clinic survey. In the next few days, you may receive an email or text message from [email or web address] with a link to a survey related to your  clinical pharmacist.\"     To schedule another MTM appointment, please call the clinic directly at [phone]. You may also schedule with me at the clinic .     My Clinical Pharmacist's contact information:                                                      Please feel free to contact me with any questions or concerns you have.      Nilsa Mata, Pharm D., MPH  MTM Pharmacist - [facility]  Secure Voicemail: [phone]  Days in the office: Monday - Thursday         "

## 2023-11-15 NOTE — PROGRESS NOTES
Medication Therapy Management (MTM) Encounter    ASSESSMENT:                            -----------------------------------------  .    Medication Adherence/Access: Re-enrolled in "[employer]" for 2024 today.  -----------------------------------------    Afib/CAD: Blood pressure at goal less than 130/80 mmHg. Patient does not have strong indication for ARB other than cardiac remodeling (no microalbuminuria, no MI history).   Tolerating rosuvastatin well. Will recheck fasting lipids at upcoming visit. Recommend discussing circulation concerns with cardiology - do not believe this to be med related.  .  -----------------------------------------  Type 2 Diabetes: A1c at goal less than 7%.  Metformin optimized at 2 g daily for macrovascular benefit.  Patient would benefit from optimizing GLP-1 for weight management and blood sugar control.  Recommend increasing Ozempic to 1 mg weekly for weight management benefit - patient declines as feels this dose helps manage appetite well, would like to focus more on lifestyle intervention.  Continue aspirin indefinitely. At goal on statin. Ok to continue to hold losartan (see above).     -----------------------------------------    Neuropathy/Back Pain: As improvement seen with higher dose pregabalin, would recommend to maintain higher daily dose, but divide into smaller doses more frequently to help reduce dizziness and better manage pain.  -----------------------------------------  .  Hypothyroidism: Stable.. Last TSH is within normal limits. Do not suspect cold intolerability relating to thyroid.      PLAN:                            1.  Continue Ozempic to 0.5 mg daily  2.  Change pregabalin to 75 mg three times daily  3.  Contact cardiology to discuss your cold hands.    Follow-up: With Shi Sneed MD next available visit. In about 3-6 months with Nilsa Mata [facility]     SUBJECTIVE/OBJECTIVE:                          Khari Jamison is a 68 year old male coming  in for a follow-up visit. Patient was accompanied by wife, Kenyetta. Today's visit is a follow-up MTM visit from 9/26/23.     Reason for visit: Medication Therapy Management .    Allergies/ADRs: Reviewed in chart  Past Medical History: Reviewed in chart  Tobacco: He reports that he has never smoked. He has never used smokeless tobacco.  Alcohol: Less than 1 beverages / week  Caffeine: Not currently using    -----------------------------------------    Medication Adherence/Access: Patient receiving Ozempic from patient assistance program - due for renewal for 2024.     -----------------------------------------    Afib/CAD:   Rosuvastatin 5 mg tablet every other day for weeks x 8 weeks  Clopidogrel 75 mg once daily (through January)  Aspirin 81 mg once daily     Patient had successful Watchman procedure in July.  No myalgias since starting rosuvastatin. Cardiology stopped losartan due to hypotension. Had recent AMANDEEP and was negative - will be able to stop DAPT in Jan per cards. Patient notes continued poor circulation concerns with cool extremitites, has not discussed this with Cardiology.    Patient does not self-monitor blood pressure.    Cholesterol  Lab Test 03/01/23   CHOL 286*   HDL 43   *   TRIG 187*     BP Readings from Last 3 Encounters:   11/15/23 102/60   11/08/23 (!) 155/96   09/27/23 127/62       -----------------------------------------    Type 2 Diabetes:   Metformin 500 mg ER tablets -2 tablets twice daily  Ozempic 0.5 mg weekly     Denies side effects since switching to Ozempic from Trulicity.  Psyllium helpful in forming stools. Finds Ozempic easier to use than thought at first -- would like to continue this as hopeful it will be better for weight loss.    Blood sugar monitoring: CGM    Continuous Glucose Monitoring Results:   Average Glucose Last 14 Days = 99 mg/dl    Time in Target Last 14 Days:  Above 180 mg/dL: 0%  In target  mg/dL: 97%  Below 70 mg/dL: 3%    Symptoms of low blood  sugar? none  Symptoms of high blood sugar? none  Eye exam: up to date -February 2023  Foot exam: due - last September 2022  Diet/Exercise: Patient works to limit carbohydrates/sweets and eat healthfully.  Goal is to manage blood sugar and weight.  Aspirin: Not taking - prefers not to take   Statin: Rosuvastatin -denies side effects  ACEi/ARB: losartan. -not taking (see above)  Microalbumin (3/21/2023): Normal  A1C (7/18/2023): 6.1%           -----------------------------------------  .    Neuropathy/Back Pain:   Pregabalin 100 mg capsule - 1 capsule twice daily  Acetaminophen 650mg ER 2 tabs twice daily     Some improvement with pregabalin dose increase in pain, but feeling dizzy on higher dose. Felt improvement initially, now less effective. Acetaminophen regimen helpful for back pain overall. Denies side effects  -----------------------------------------  .  Hypothyroidism:   Levothyroxine 88 mcg daily  Patient is having the following symptoms: hypothyroidism -  cold intolerance, cold extremities.   TSH   Date Value Ref Range Status   07/18/2023 1.97 0.30 - 4.20 uIU/mL Final   05/07/2023 2.03 0.30 - 5.00 uIU/mL Final         Today's Vitals: /60   Pulse 75   Wt (!) 334 lb 12.8 oz (151.9 kg)   BMI 42.97 kg/m    ----------------      I spent 30 minutes with this patient today. All changes were made via collaborative practice agreement with Shi Sneed MD. A copy of the visit note was provided to the patient's provider(s).    A summary of these recommendations was given to the patient.    Nilsa Mata, Pharm D., MPH  MTM Pharmacist - [facility]  Secure Voicemail: [phone]  In Office: Monday - Thursday         Medication Therapy Recommendations  Neuropathy    Current Medication: pregabalin (LYRICA) 75 MG capsule   Rationale: Dose too high - Dosage too high - Safety   Recommendation: Decrease Dose   Status: Accepted per Provider         Type 2 diabetes mellitus without complication, without  long-term current use of insulin (H)    Current Medication: semaglutide (OZEMPIC, 0.25 OR 0.5 MG/DOSE,) 2 MG/3ML pen   Rationale: Dose too low - Dosage too low - Effectiveness   Recommendation: Increase Dose   Status: Declined per Patient          Current Medication: semaglutide (OZEMPIC, 0.25 OR 0.5 MG/DOSE,) 2 MG/3ML pen   Rationale: Cannot afford medication product - Cost - Adherence   Recommendation: Referral to Service    Status: Accepted - no CPA Needed

## 2023-11-16 ENCOUNTER — TELEPHONE (OUTPATIENT)
Dept: CARDIOLOGY | Facility: CLINIC | Age: 68
End: 2023-11-16
Payer: COMMERCIAL

## 2023-11-16 NOTE — TELEPHONE ENCOUNTER
Incoming call yesterday and stated he was directed to contact cardiology regarding his cold hands and also his dizziness and lightheadedness. Called back for more details and LM with direct number for call back. -SC

## 2023-11-17 NOTE — TELEPHONE ENCOUNTER
Incoming call from patient. He reports that since stopping his losartan that his blood pressure has improved, he states it is now closer to 120s/60. He states he gets very dizzy when going from sitting to standing. He says it is a rush of dizzines when he stands and then it takes a while for this feeling to go away. He does have a BP cuff at home and asked if he would be willing to have his wife help him take his BP when he is sitting then to wait and then take it right after he is standing to see if he is experiencing orthostatic hypotension. He will do this a few times over the weekend and call back Monday with results. He reports that his hands are ice cold, only his hands, not his feet. He said this has been going on for a while and is not a new symptom, he had this prior to his LAAC. He is working with his pharmacist and will decrease his Lyrica to see if this would help. Will discuss BP findings on Monday. -SC

## 2023-11-20 NOTE — TELEPHONE ENCOUNTER
Orthostatic lightheadedness could still be due to a sudden drop in blood pressure followed by a rebound elevation.  Has he decreased his soda consumption and increased his other fluid intake as previously recommended?  Also, go slowly with position changes.  Recommend he follow-up with his PCP.  Thanks,  Gail

## 2023-11-20 NOTE — TELEPHONE ENCOUNTER
Incoming call from patient. He stated he checked his BP when sitting and was 121/75. He waited a couple minutes then checked when he stood up and was 142/77. He reported dizziness when standing at that time. Will route to [place] and call patient back. -SC

## 2023-11-21 NOTE — TELEPHONE ENCOUNTER
Call placed to patient regarding message below. Left message with direct number left for call back. -SC

## 2023-11-21 NOTE — TELEPHONE ENCOUNTER
Incoming call from patient. Discussed recommendations from Gail to monitor BP and increase fluid intake with something other than soda. He states BP on standing actually rises instead of drops. He will work on increasing fluid intake, slow position changes, and follow up with PCP regarding cold hands and dizziness. He is appreciative of information. No further questions at this time. [facility]

## 2024-01-09 DIAGNOSIS — Z95.818 PRESENCE OF WATCHMAN LEFT ATRIAL APPENDAGE CLOSURE DEVICE: Primary | ICD-10-CM

## 2024-01-22 ENCOUNTER — TELEPHONE (OUTPATIENT)
Dept: CARDIOLOGY | Facility: CLINIC | Age: 69
End: 2024-01-22
Payer: COMMERCIAL

## 2024-01-22 NOTE — TELEPHONE ENCOUNTER
Patient s/p LAAC 7/27/2023. Per post-LAAC medication protocol, patient to remain on once daily 81 mg ASA for life and once daily 75 mg Plavix until six months post-LAAC, which will be 1/27/2024. He will see ENOCH Ruff on 2/15/2024. Patient verbalized he will take last dose of Plavix Saturday and continue on once daily 81 mg ASA for life. No further questions at this time. [facility]    ----- Message from Dejah Hurtado RN sent at 7/28/2023 10:06 AM CDT -----  Regarding: Call for 6mo f/u and Gera

## 2024-02-15 ENCOUNTER — OFFICE VISIT (OUTPATIENT)
Dept: CARDIOLOGY | Facility: CLINIC | Age: 69
End: 2024-02-15
Payer: COMMERCIAL

## 2024-02-15 VITALS
WEIGHT: 315 LBS | BODY MASS INDEX: 42.99 KG/M2 | SYSTOLIC BLOOD PRESSURE: 112 MMHG | RESPIRATION RATE: 20 BRPM | DIASTOLIC BLOOD PRESSURE: 54 MMHG | HEART RATE: 87 BPM

## 2024-02-15 DIAGNOSIS — E78.5 HYPERLIPIDEMIA, UNSPECIFIED HYPERLIPIDEMIA TYPE: ICD-10-CM

## 2024-02-15 DIAGNOSIS — I10 ESSENTIAL HYPERTENSION: ICD-10-CM

## 2024-02-15 DIAGNOSIS — Z95.818 PRESENCE OF WATCHMAN LEFT ATRIAL APPENDAGE CLOSURE DEVICE: ICD-10-CM

## 2024-02-15 DIAGNOSIS — I48.19 PERSISTENT ATRIAL FIBRILLATION (H): Primary | ICD-10-CM

## 2024-02-15 PROCEDURE — 99214 OFFICE O/P EST MOD 30 MIN: CPT | Performed by: PHYSICIAN ASSISTANT

## 2024-02-15 NOTE — PROGRESS NOTES
HEART CARE ENCOUNTER NOTE       [facility]  [phone]      Assessment/Recommendations   1.  Persistent atrial fibrillation: Status post ablation 2016, and now status post 31 mm watchman flex on 7/27/2023.  His postprocedure imaging showed a well-seated device and was without thrombus.  He is currently taking aspirin 81 mg daily    MODIFIED MEETA SCALE   Timepoint: 6mo Post-LAAC    Previous score: 0    Score Description   0 No symptoms at all   1 No significant disability despite symptoms; able to carry out all usual duties and activities   2 Slight disability; unable to carry out all previous activities, but able to look after own affairs without assistance   3 Moderate disability; requiring some help, but able to walk without assistance   4 Moderately severe disability; unable to walk without assistance and unable to attend to own bodily needs without assistance   5 Severe disability; bedridden, incontinent and requiring constant nursing care and attention   6 Dead    Total score (0 - 6):  0    Change in score if s/p LAAC? N/A      2.  Hypertension -blood pressure is controlled.    3.  Hyperlipidemia LDL goal < 100 -most recent , total cholesterol 286 (March 2023) He has had intolerance with some statin medications. He reports he has been taking Crestor 5 mg every other day. He has a follow-up appointment with his PCP next week and will be getting a repeat lipid level at that time         History of Present Illness/Subjective    Khari Jamison is a 68 year old male who comes in today 6-month visit after watchman implant. He is accompanied by his wife.    Khari Jamison has a past history of persistent atrial fibrillation (s/p Watchman July 2023), typical atrial flutter hypertension, hyperlipidemia, obstructive sleep apnea, type 2 diabetes mellitus.    He has had dizziness, lightheadedness, and balance issues since May 2023 and he attributes this to his Covid illness he had at that  time. He denies concern for stroke since the Watchman procedure. He reports shortness of breath when he walks. He has an appointment with his Primary Care Provider at [facility] next week.    Khari Jamison denies chest discomfort, palpitations, paroxysmal nocturnal dyspnea, orthopnea, lightheadedness, dizziness, pre-syncope, or syncope.  Khari Jamison also denies any weight loss, changes in appetite, nausea or vomiting.     Medical, surgical, family, social history, and medications were reviewed and updated as necessary.    ECHO results (from 11/8/2023):  Interpretation Summary     TRANSESOPHAGEAL ECHOCARDIOGRAM     1. Normal left ventricular size and systolic performance with a visually  estimated ejection fraction of 60%.  2. No significant valvular heart disease is identified on this study.  3. Normal right ventricular size and systolic performance.  4. There is mild to moderate left atrial enlargement.  5. There is a left atrial appendage occlusion device. The device appears well-  placed and seated.  Â  No thrombus is detected upon the surface of the device.  Â  No flow was detected around the device.  6. No residual postprocedural atrial communication is identified.  7. Echo contrast examination was performed using agitated NS as contrast  agent. The right heart opacity was good and the left heart was well  visualized. There was no evidence of right to left shunting during spontaneous  respiration or following release of Valsalva.    EKG (personally reviewed and interpreted): 7/24/2023  Sinus rhythm with PVCs  Ventricular rate 85     Physical Examination Review of Systems   Vitals: /54 (BP Location: Right arm, Patient Position: Sitting, Cuff Size: Adult Large)   Pulse 87   Resp 20   Wt (!) 152 kg (335 lb)   BMI 42.99 kg/m    BMI= Body mass index is 42.99 kg/m .  Wt Readings from Last 3 Encounters:   02/15/24 (!) 152 kg (335 lb)   11/15/23 (!) 151.9 kg (334 lb 12.8 oz)   11/08/23 (!) 154.2 kg (340 lb)        General Appearance:   Alert, cooperative and in no acute distress   ENT/Mouth: membranes moist, no oral lesions or bleeding gums.      EYES:  no scleral icterus, normal conjunctivae   Neck: Thyroid not visualized   Chest/Lungs:   lungs are clear to auscultation, no rales or wheezing   Cardiovascular:   Regular . Normal first and second heart sounds with no murmurs, rubs or gallops; the carotid, radial and posterior tibial pulses are intact, no edema bilaterally    Abdomen:  Soft and nontender. Bowel sounds are present in all quadrants   Extremities: no cyanosis or clubbing   Skin: no xanthelasma, warm.    Neurologic: normal gait, normal  bilateral, no tremors   Psychiatric: Normal mood and affect       Please refer above for cardiac ROS details.      Medical History  Surgical History Family History Social History   Past Medical History:   Diagnosis Date    Arrhythmia     a-fib    Atrial fibrillation (H)     Back pain     Back pain     Diabetes mellitus (H)     Hyperlipemia     Kidney stone     Obesity     GASTON (obstructive sleep apnea)     CPAP    PONV (postoperative nausea and vomiting)     postoperatively with oral pain meds    Status post ablation of atrial fibrillation 1/27/2016    PVI Sept 2014 (cryo-PVI + roof line + SAMUEL line)     Past Surgical History:   Procedure Laterality Date    CARDIAC ELECTROPHYSIOLOGY MAPPING AND ABLATION  1/27/16    pvi    CARDIOVERSION  10/31/14    CARDIOVERSION  05/16/2016    COLONOSCOPY N/A 11/18/2015    Procedure: COLONOSCOPY WITH POLYPECTOMY USING BIOPSY FORCEP;  Surgeon: Coco Vallecillo MD;  Location: [facility] GI;  Service:     COMBINED CYSTOSCOPY, INSERT STENT URETER(S) Bilateral 8/20/2015    Procedure: CYSTOSCOPY, BILATERAL STENT REMOVAL, LEFT URETEROSCOPY, STONE EXTRACTION, LEFT STENT INSERTION;  Surgeon: Ansley Jeffery MD;  Location: [facility] OR;  Service:     CV LEFT ATRIAL APPENDAGE CLOSURE Right 7/27/2023    Procedure: Left Atrial Appendage Closure;   Surgeon: Judy Tapia MD;  Location: [facility] CATH LAB CV    CYSTOSCOPY W/ LASER LITHOTRIPSY  aug 2015    OTHER SURGICAL HISTORY  9/4/14    pulmonary vein isolation    OTHER SURGICAL HISTORY      eyelid lift    [place] ABLATE HEART DYSRHYTHM FOCUS      Description: Catheter Ablation Atrial Fibrillation;  Recorded: 10/08/2014;  Comments: 9/4/14 PVI Cyro to 4 PVs; Roof and SAMUEL lines done.    [place] CARDIOVERSION ELECTIVE ARRHYTHMIA EXTERNAL  09/11/2015    Description: Elective Cardioversion External;  Recorded: 03/06/2014;  Comments: 10/11/13; ; 11/27/13 and reverted to afib after 11 days on mod dose sotalol.; ; 1/3/14  sinus on Amio    [place] CARDIOVERSION ELECTIVE ARRHYTHMIA EXTERNAL      Description: Elective Cardioversion External;  Recorded: 11/19/2014;  Comments: 10/11/13; ; 11/27/13 and reverted to afib after 11 days on mod dose sotalol.; ; 1/3/14  sinus on Amio.  10/31/14    [place] CYSTO/URETERO W/LITHOTRIPSY &INDWELL STENT INSRT Right 7/16/2019    Procedure: CYSTOSCOPY RIGHT URETEROSCOPY, LASER  LITHOTRIPSY STENT INSERTION;  Surgeon: Kingston Kurtz MD;  Location: [facility];  Service: Urology    REMOVAL OF SPERM DUCT(S)      Description: Surgery Of Male Genitalia Vasectomy;  Recorded: 10/29/2013;    TONSILLECTOMY      VASECTOMY       Family History   Problem Relation Age of Onset    Cancer Mother         uterine    Diabetes Maternal Aunt     Urolithiasis No family hx of     Clotting Disorder No family hx of     Gout No family hx of     Heart Disease No family hx of     Anesthesia Reaction No family hx of     Social History     Socioeconomic History    Marital status:      Spouse name: Not on file    Number of children: Not on file    Years of education: Not on file    Highest education level: Not on file   Occupational History    Not on file   Tobacco Use    Smoking status: Never    Smokeless tobacco: Never   Substance and Sexual Activity    Alcohol use: No     Alcohol/week: 1.0 standard drink of  alcohol    Drug use: No    Sexual activity: Not on file   Other Topics Concern    Not on file   Social History Narrative    Not on file     Social Determinants of Health     Financial Resource Strain: Not on file   Food Insecurity: Not on file   Transportation Needs: Not on file   Physical Activity: Not on file   Stress: Not on file   Social Connections: Not on file   Interpersonal Safety: Not on file   Housing Stability: Not on file          Medications  Allergies   Current Outpatient Medications   Medication Sig Dispense Refill    acetaminophen (TYLENOL) 650 MG CR tablet Take 650 mg by mouth every 8 hours as needed for mild pain or fever      alfuzosin ER (UROXATRAL) 10 MG 24 hr tablet Take 10 mg by mouth daily      aspirin 81 MG EC tablet Take 1 tablet (81 mg) by mouth daily      Continuous Blood Gluc Sensor (FREESTYLE OLEGARIO 2 SENSOR) MISC 1 each every 14 days Change every 14 days.      cyanocobalamin (CYANOCOBALAMIN) 1000 MCG/ML injection Inject 1 mL into the muscle every 30 days      levothyroxine (SYNTHROID, LEVOTHROID) 88 MCG tablet Take 88 mcg by mouth At Bedtime      metFORMIN (GLUCOPHAGE XR) 500 MG 24 hr tablet Take 1,000 mg by mouth 2 times daily (with meals)      pregabalin (LYRICA) 75 MG capsule Take 75 mg by mouth 3 times daily      rosuvastatin (CRESTOR) 5 MG tablet Take 5 mg by mouth every other day      semaglutide (OZEMPIC, 0.25 OR 0.5 MG/DOSE,) 2 MG/3ML pen Inject 0.5 mg Subcutaneous every 7 days      triamcinolone (NASACORT) 55 MCG/ACT nasal aerosol Spray 2 sprays into both nostrils daily as needed      Vitamin D3 (VITAMIN D, CHOLECALCIFEROL,) 25 mcg (1000 units) tablet Take 1 tablet by mouth every morning      Allergies   Allergen Reactions    Ezetimibe GI Disturbance    Simvastatin      Other reaction(s): myalgias, arthralgias         Lab Results    Chemistry/lipid CBC Cardiac Enzymes/BNP/TSH/INR   Recent Labs   Lab Test 03/01/23  1319   CHOL 286*   HDL 43   *   TRIG 187*     Recent Labs  "  Lab Test 03/01/23  1319 01/04/22  0940 10/20/21  1605   * 143* 172*     Recent Labs   Lab Test 11/08/23  0801 10/30/23  0954   NA  --  139   POTASSIUM  --  4.7   CHLORIDE  --  101   CO2  --  25   * 94   BUN  --  19.3   CR  --  1.17   GFRESTIMATED  --  68   ASHLEIGH  --  10.0     Recent Labs   Lab Test 10/30/23  0954 09/26/23  2312 07/27/23  0956   CR 1.17 0.92 1.05     Recent Labs   Lab Test 05/08/23  0425   A1C 6.5*    Recent Labs   Lab Test 09/26/23  2312   WBC 9.8   HGB 13.8   HCT 42.0   MCV 90        Recent Labs   Lab Test 09/26/23  2312 07/27/23  0956 07/24/23  1032   HGB 13.8 14.0 15.2    No results for input(s): \"TROPONINI\" in the last 46133 hours.  Recent Labs   Lab Test 09/26/23  2312 05/07/23  1944   BNP  --  110*   NTBNP 741  --      Recent Labs   Lab Test 07/18/23  1509   TSH 1.97     Recent Labs   Lab Test 05/08/23  0421   INR 1.06        35 minutes spent on the date of encounter doing education, chart prep/review, review of outside records, review of test results, interpretation with above tests, patient visit, documentation, and discussion with family.      This note has been dictated using voice recognition software. Any grammatical or context distortions are unintentional and inherent to the software.    Elzbieta Ruiz PA-C  Structural Heart Program  [facility]   "

## 2024-02-15 NOTE — PATIENT INSTRUCTIONS
Khari Jamison,    It was a pleasure to see you today in the clinic regarding your 6 month visit after Watchman.     My recommendations after this visit include:     - no medication changes     You should followup with your primary care provider    **Remember you will need prophylactic antibiotics for all dental visits in the future.  You can get the Rx from your dentist, your PCP or from us.  If possible, still refrain from going to the dentist until 6 months or more after your valve replacement      If you have questions or concerns, please call using the numbers below:    Valve Clinic Phone   [phone]    After Hours/Scheduling  [phone]    Otherwise you can dial the nurse directly at:                  TWILA Valverde RN  [phone]    Elzbieta Ruiz PA-C  Structural Heart Program  [facility]

## 2024-02-15 NOTE — LETTER
2/15/2024    Shi Sneed MD  [address] [facility] [address]    RE: Khari Jamison       Dear Colleague,     I had the pleasure of seeing Khari Jamison in the [facility].  HEART CARE ENCOUNTER NOTE       [facility]  [phone]      Assessment/Recommendations   1.  Persistent atrial fibrillation: Status post ablation 2016, and now status post 31 mm watchman flex on 7/27/2023.  His postprocedure imaging showed a well-seated device and was without thrombus.  He is currently taking aspirin 81 mg daily    MODIFIED MEETA SCALE   Timepoint: 6mo Post-LAAC    Previous score: 0    Score Description   0 No symptoms at all   1 No significant disability despite symptoms; able to carry out all usual duties and activities   2 Slight disability; unable to carry out all previous activities, but able to look after own affairs without assistance   3 Moderate disability; requiring some help, but able to walk without assistance   4 Moderately severe disability; unable to walk without assistance and unable to attend to own bodily needs without assistance   5 Severe disability; bedridden, incontinent and requiring constant nursing care and attention   6 Dead    Total score (0 - 6):  0    Change in score if s/p LAAC? N/A      2.  Hypertension -blood pressure is controlled.    3.  Hyperlipidemia LDL goal < 100 -most recent , total cholesterol 286 (March 2023) He has had intolerance with some statin medications. He reports he has been taking Crestor 5 mg every other day. He has a follow-up appointment with his PCP next week and will be getting a repeat lipid level at that time         History of Present Illness/Subjective    Khari Jamison is a 68 year old male who comes in today 6-month visit after watchman implant. He is accompanied by his wife.    Khari Jamison has a past history of persistent atrial fibrillation (s/p Watchman July 2023), typical atrial flutter  hypertension, hyperlipidemia, obstructive sleep apnea, type 2 diabetes mellitus.    He has had dizziness, lightheadedness, and balance issues since May 2023 and he attributes this to his Covid illness he had at that time. He denies concern for stroke since the Watchman procedure. He reports shortness of breath when he walks. He has an appointment with his Primary Care Provider at [facility] next week.    Khari Jamison denies chest discomfort, palpitations, paroxysmal nocturnal dyspnea, orthopnea, lightheadedness, dizziness, pre-syncope, or syncope.  hKari Jamison also denies any weight loss, changes in appetite, nausea or vomiting.     Medical, surgical, family, social history, and medications were reviewed and updated as necessary.    ECHO results (from 11/8/2023):  Interpretation Summary     TRANSESOPHAGEAL ECHOCARDIOGRAM     1. Normal left ventricular size and systolic performance with a visually  estimated ejection fraction of 60%.  2. No significant valvular heart disease is identified on this study.  3. Normal right ventricular size and systolic performance.  4. There is mild to moderate left atrial enlargement.  5. There is a left atrial appendage occlusion device. The device appears well-  placed and seated.  Â  No thrombus is detected upon the surface of the device.  Â  No flow was detected around the device.  6. No residual postprocedural atrial communication is identified.  7. Echo contrast examination was performed using agitated NS as contrast  agent. The right heart opacity was good and the left heart was well  visualized. There was no evidence of right to left shunting during spontaneous  respiration or following release of Valsalva.    EKG (personally reviewed and interpreted): 7/24/2023  Sinus rhythm with PVCs  Ventricular rate 85     Physical Examination Review of Systems   Vitals: /54 (BP Location: Right arm, Patient Position: Sitting, Cuff Size: Adult Large)   Pulse 87   Resp 20   Wt (!)  152 kg (335 lb)   BMI 42.99 kg/m    BMI= Body mass index is 42.99 kg/m .  Wt Readings from Last 3 Encounters:   02/15/24 (!) 152 kg (335 lb)   11/15/23 (!) 151.9 kg (334 lb 12.8 oz)   11/08/23 (!) 154.2 kg (340 lb)       General Appearance:   Alert, cooperative and in no acute distress   ENT/Mouth: membranes moist, no oral lesions or bleeding gums.      EYES:  no scleral icterus, normal conjunctivae   Neck: Thyroid not visualized   Chest/Lungs:   lungs are clear to auscultation, no rales or wheezing   Cardiovascular:   Regular . Normal first and second heart sounds with no murmurs, rubs or gallops; the carotid, radial and posterior tibial pulses are intact, no edema bilaterally    Abdomen:  Soft and nontender. Bowel sounds are present in all quadrants   Extremities: no cyanosis or clubbing   Skin: no xanthelasma, warm.    Neurologic: normal gait, normal  bilateral, no tremors   Psychiatric: Normal mood and affect       Please refer above for cardiac ROS details.      Medical History  Surgical History Family History Social History   Past Medical History:   Diagnosis Date    Arrhythmia     a-fib    Atrial fibrillation (H)     Back pain     Back pain     Diabetes mellitus (H)     Hyperlipemia     Kidney stone     Obesity     GASTON (obstructive sleep apnea)     CPAP    PONV (postoperative nausea and vomiting)     postoperatively with oral pain meds    Status post ablation of atrial fibrillation 1/27/2016    PVI Sept 2014 (cryo-PVI + roof line + SAMUEL line)     Past Surgical History:   Procedure Laterality Date    CARDIAC ELECTROPHYSIOLOGY MAPPING AND ABLATION  1/27/16    pvi    CARDIOVERSION  10/31/14    CARDIOVERSION  05/16/2016    COLONOSCOPY N/A 11/18/2015    Procedure: COLONOSCOPY WITH POLYPECTOMY USING BIOPSY FORCEP;  Surgeon: Coco Vallecillo MD;  Location: [facility];  Service:     COMBINED CYSTOSCOPY, INSERT STENT URETER(S) Bilateral 8/20/2015    Procedure: CYSTOSCOPY, BILATERAL STENT REMOVAL, LEFT  URETEROSCOPY, STONE EXTRACTION, LEFT STENT INSERTION;  Surgeon: Ansley Jeffery MD;  Location: [facility] OR;  Service:     CV LEFT ATRIAL APPENDAGE CLOSURE Right 7/27/2023    Procedure: Left Atrial Appendage Closure;  Surgeon: Judy Tapia MD;  Location: [facility] CATH LAB CV    CYSTOSCOPY W/ LASER LITHOTRIPSY  aug 2015    OTHER SURGICAL HISTORY  9/4/14    pulmonary vein isolation    OTHER SURGICAL HISTORY      eyelid lift    HI ABLATE HEART DYSRHYTHM FOCUS      Description: Catheter Ablation Atrial Fibrillation;  Recorded: 10/08/2014;  Comments: 9/4/14 PVI Cyro to 4 PVs; Roof and SAMUEL lines done.    HI CARDIOVERSION ELECTIVE ARRHYTHMIA EXTERNAL  09/11/2015    Description: Elective Cardioversion External;  Recorded: 03/06/2014;  Comments: 10/11/13; ; 11/27/13 and reverted to afib after 11 days on mod dose sotalol.; ; 1/3/14  sinus on Amio    HI CARDIOVERSION ELECTIVE ARRHYTHMIA EXTERNAL      Description: Elective Cardioversion External;  Recorded: 11/19/2014;  Comments: 10/11/13; ; 11/27/13 and reverted to afib after 11 days on mod dose sotalol.; ; 1/3/14  sinus on Amio.  10/31/14    HI CYSTO/URETERO W/LITHOTRIPSY &INDWELL STENT INSRT Right 7/16/2019    Procedure: CYSTOSCOPY RIGHT URETEROSCOPY, LASER  LITHOTRIPSY STENT INSERTION;  Surgeon: Kingston Kurtz MD;  Location: [facility];  Service: Urology    REMOVAL OF SPERM DUCT(S)      Description: Surgery Of Male Genitalia Vasectomy;  Recorded: 10/29/2013;    TONSILLECTOMY      VASECTOMY       Family History   Problem Relation Age of Onset    Cancer Mother         uterine    Diabetes Maternal Aunt     Urolithiasis No family hx of     Clotting Disorder No family hx of     Gout No family hx of     Heart Disease No family hx of     Anesthesia Reaction No family hx of     Social History     Socioeconomic History    Marital status:      Spouse name: Not on file    Number of children: Not on file    Years of education: Not on file    Highest  education level: Not on file   Occupational History    Not on file   Tobacco Use    Smoking status: Never    Smokeless tobacco: Never   Substance and Sexual Activity    Alcohol use: No     Alcohol/week: 1.0 standard drink of alcohol    Drug use: No    Sexual activity: Not on file   Other Topics Concern    Not on file   Social History Narrative    Not on file     Social Determinants of Health     Financial Resource Strain: Not on file   Food Insecurity: Not on file   Transportation Needs: Not on file   Physical Activity: Not on file   Stress: Not on file   Social Connections: Not on file   Interpersonal Safety: Not on file   Housing Stability: Not on file          Medications  Allergies   Current Outpatient Medications   Medication Sig Dispense Refill    acetaminophen (TYLENOL) 650 MG CR tablet Take 650 mg by mouth every 8 hours as needed for mild pain or fever      alfuzosin ER (UROXATRAL) 10 MG 24 hr tablet Take 10 mg by mouth daily      aspirin 81 MG EC tablet Take 1 tablet (81 mg) by mouth daily      Continuous Blood Gluc Sensor (FREESTYLE OLEGARIO 2 SENSOR) MISC 1 each every 14 days Change every 14 days.      cyanocobalamin (CYANOCOBALAMIN) 1000 MCG/ML injection Inject 1 mL into the muscle every 30 days      levothyroxine (SYNTHROID, LEVOTHROID) 88 MCG tablet Take 88 mcg by mouth At Bedtime      metFORMIN (GLUCOPHAGE XR) 500 MG 24 hr tablet Take 1,000 mg by mouth 2 times daily (with meals)      pregabalin (LYRICA) 75 MG capsule Take 75 mg by mouth 3 times daily      rosuvastatin (CRESTOR) 5 MG tablet Take 5 mg by mouth every other day      semaglutide (OZEMPIC, 0.25 OR 0.5 MG/DOSE,) 2 MG/3ML pen Inject 0.5 mg Subcutaneous every 7 days      triamcinolone (NASACORT) 55 MCG/ACT nasal aerosol Spray 2 sprays into both nostrils daily as needed      Vitamin D3 (VITAMIN D, CHOLECALCIFEROL,) 25 mcg (1000 units) tablet Take 1 tablet by mouth every morning      Allergies   Allergen Reactions    Ezetimibe GI Disturbance     "Simvastatin      Other reaction(s): myalgias, arthralgias         Lab Results    Chemistry/lipid CBC Cardiac Enzymes/BNP/TSH/INR   Recent Labs   Lab Test 03/01/23  1319   CHOL 286*   HDL 43   *   TRIG 187*     Recent Labs   Lab Test 03/01/23  1319 01/04/22  0940 10/20/21  1605   * 143* 172*     Recent Labs   Lab Test 11/08/23  0801 10/30/23  0954   NA  --  139   POTASSIUM  --  4.7   CHLORIDE  --  101   CO2  --  25   * 94   BUN  --  19.3   CR  --  1.17   GFRESTIMATED  --  68   ASHLEIGH  --  10.0     Recent Labs   Lab Test 10/30/23  0954 09/26/23  2312 07/27/23  0956   CR 1.17 0.92 1.05     Recent Labs   Lab Test 05/08/23  0425   A1C 6.5*    Recent Labs   Lab Test 09/26/23  2312   WBC 9.8   HGB 13.8   HCT 42.0   MCV 90        Recent Labs   Lab Test 09/26/23  2312 07/27/23  0956 07/24/23  1032   HGB 13.8 14.0 15.2    No results for input(s): \"TROPONINI\" in the last 90271 hours.  Recent Labs   Lab Test 09/26/23  2312 05/07/23  1944   BNP  --  110*   NTBNP 741  --      Recent Labs   Lab Test 07/18/23  1509   TSH 1.97     Recent Labs   Lab Test 05/08/23  0421   INR 1.06        35 minutes spent on the date of encounter doing education, chart prep/review, review of outside records, review of test results, interpretation with above tests, patient visit, documentation, and discussion with family.      This note has been dictated using voice recognition software. Any grammatical or context distortions are unintentional and inherent to the software.    Elzbieta Ruiz PA-C  Structural Heart Program  [facility]       Thank you for allowing me to participate in the care of your patient.      Sincerely,     Elzbieta Ruiz PA-C     [facility]  cc:   Yamilka Beltran PA-C  [address]  [place] [address]      "

## 2024-02-20 ENCOUNTER — LAB REQUISITION (OUTPATIENT)
Dept: LAB | Facility: CLINIC | Age: 69
End: 2024-02-20

## 2024-02-20 ENCOUNTER — TRANSFERRED RECORDS (OUTPATIENT)
Dept: HEALTH INFORMATION MANAGEMENT | Facility: CLINIC | Age: 69
End: 2024-02-20
Payer: COMMERCIAL

## 2024-02-20 DIAGNOSIS — E03.9 HYPOTHYROIDISM, UNSPECIFIED: ICD-10-CM

## 2024-02-20 DIAGNOSIS — E11.40 TYPE 2 DIABETES MELLITUS WITH DIABETIC NEUROPATHY, UNSPECIFIED (H): ICD-10-CM

## 2024-02-20 DIAGNOSIS — E78.2 MIXED HYPERLIPIDEMIA: ICD-10-CM

## 2024-02-20 DIAGNOSIS — Z12.5 ENCOUNTER FOR SCREENING FOR MALIGNANT NEOPLASM OF PROSTATE: ICD-10-CM

## 2024-02-20 LAB
ALBUMIN SERPL BCG-MCNC: 4.2 G/DL (ref 3.5–5.2)
ALP SERPL-CCNC: 87 U/L (ref 40–150)
ALT SERPL W P-5'-P-CCNC: 12 U/L (ref 0–70)
ANION GAP SERPL CALCULATED.3IONS-SCNC: 10 MMOL/L (ref 7–15)
AST SERPL W P-5'-P-CCNC: 14 U/L (ref 0–45)
BILIRUB SERPL-MCNC: 0.4 MG/DL
BUN SERPL-MCNC: 11.3 MG/DL (ref 8–23)
CALCIUM SERPL-MCNC: 9.5 MG/DL (ref 8.8–10.2)
CHLORIDE SERPL-SCNC: 103 MMOL/L (ref 98–107)
CHOLEST SERPL-MCNC: 152 MG/DL
CREAT SERPL-MCNC: 1.05 MG/DL (ref 0.67–1.17)
DEPRECATED HCO3 PLAS-SCNC: 28 MMOL/L (ref 22–29)
EGFRCR SERPLBLD CKD-EPI 2021: 77 ML/MIN/1.73M2
FASTING STATUS PATIENT QL REPORTED: NORMAL
GLUCOSE SERPL-MCNC: 102 MG/DL (ref 70–99)
HBA1C MFR BLD: 5.6 % (ref 4.2–6.1)
HDLC SERPL-MCNC: 40 MG/DL
LDLC SERPL CALC-MCNC: 92 MG/DL
NONHDLC SERPL-MCNC: 112 MG/DL
POTASSIUM SERPL-SCNC: 4.8 MMOL/L (ref 3.4–5.3)
PROT SERPL-MCNC: 7.1 G/DL (ref 6.4–8.3)
PSA SERPL DL<=0.01 NG/ML-MCNC: 4.44 NG/ML (ref 0–4.5)
SODIUM SERPL-SCNC: 141 MMOL/L (ref 135–145)
TRIGL SERPL-MCNC: 101 MG/DL
TSH SERPL DL<=0.005 MIU/L-ACNC: 3.51 UIU/ML (ref 0.3–4.2)

## 2024-02-20 PROCEDURE — G0103 PSA SCREENING: HCPCS | Performed by: FAMILY MEDICINE

## 2024-02-20 PROCEDURE — 80061 LIPID PANEL: CPT | Performed by: FAMILY MEDICINE

## 2024-02-20 PROCEDURE — 84443 ASSAY THYROID STIM HORMONE: CPT | Performed by: FAMILY MEDICINE

## 2024-02-20 PROCEDURE — 80053 COMPREHEN METABOLIC PANEL: CPT | Performed by: FAMILY MEDICINE

## 2024-02-27 ENCOUNTER — OFFICE VISIT (OUTPATIENT)
Dept: PHARMACY | Facility: PHYSICIAN GROUP | Age: 69
End: 2024-02-27
Payer: COMMERCIAL

## 2024-02-27 VITALS
BODY MASS INDEX: 42.61 KG/M2 | DIASTOLIC BLOOD PRESSURE: 60 MMHG | HEART RATE: 63 BPM | SYSTOLIC BLOOD PRESSURE: 120 MMHG | WEIGHT: 315 LBS

## 2024-02-27 DIAGNOSIS — E11.9 TYPE 2 DIABETES MELLITUS WITHOUT COMPLICATION, WITHOUT LONG-TERM CURRENT USE OF INSULIN (H): Primary | ICD-10-CM

## 2024-02-27 DIAGNOSIS — Z76.89 ENCOUNTER FOR WEIGHT MANAGEMENT: ICD-10-CM

## 2024-02-27 DIAGNOSIS — E03.9 ACQUIRED HYPOTHYROIDISM: ICD-10-CM

## 2024-02-27 DIAGNOSIS — R39.14 BENIGN PROSTATIC HYPERPLASIA WITH INCOMPLETE BLADDER EMPTYING: ICD-10-CM

## 2024-02-27 DIAGNOSIS — I48.0 PAROXYSMAL ATRIAL FIBRILLATION (H): ICD-10-CM

## 2024-02-27 DIAGNOSIS — I25.10 CORONARY ARTERY DISEASE INVOLVING NATIVE CORONARY ARTERY OF NATIVE HEART WITHOUT ANGINA PECTORIS: ICD-10-CM

## 2024-02-27 DIAGNOSIS — E78.5 HYPERLIPIDEMIA LDL GOAL <100: ICD-10-CM

## 2024-02-27 DIAGNOSIS — G62.9 NEUROPATHY: ICD-10-CM

## 2024-02-27 DIAGNOSIS — N40.1 BENIGN PROSTATIC HYPERPLASIA WITH INCOMPLETE BLADDER EMPTYING: ICD-10-CM

## 2024-02-27 PROCEDURE — 99607 MTMS BY PHARM ADDL 15 MIN: CPT | Performed by: PHARMACIST

## 2024-02-27 PROCEDURE — 99605 MTMS BY PHARM NP 15 MIN: CPT | Performed by: PHARMACIST

## 2024-02-27 RX ORDER — SEMAGLUTIDE 1.34 MG/ML
1 INJECTION, SOLUTION SUBCUTANEOUS
COMMUNITY

## 2024-02-27 NOTE — PATIENT INSTRUCTIONS
"Recommendations from MTM Pharmacist visit:                                                    MTM (medication therapy management) is a service provided by a clinical pharmacist designed to help you get the most of out of your medicines.  You may be sent a phone or email survey evaluating today's visit.  Please provide feedback you have for the service he received today if you are able.    Continue current meds as prescribed. When you are down to starting your last pen of Ozempic 1 mg, call to get an appointment with the new Medication Therapy Management pharmacist to discuss whether you would benefit from going up to the max dose of Ozempic 2mg weekly for additional weight management benefit.    Try SkinTac Wipes under Jose sensor to help with adhesion. You may also try a Jose 2 patch cover or a large bandaid or Tegaderm to cover sensor to help it stick better.    Follow-up: with Dr. Sneed as planned. As needed with Medication Therapy Management pharmacist - at a minimum in November to renew [employer] application for Ozempic.      It was great speaking with you today.  I value your experience and would be very thankful for your time in providing feedback in our clinic survey. In the next few days, you may receive an email or text message from [employer] with a link to a survey related to your  clinical pharmacist.\"     To schedule another MTM appointment, please call the clinic directly at [phone]. You may also schedule with me at the clinic .     My Clinical Pharmacist's contact information:                                                      Please feel free to contact me with any questions or concerns you have.      Nilsa Mata, Pharm D., MPH  MTM Pharmacist - [facility]  Secure Voicemail: [phone]  Days in the office: Monday - Thursday         "

## 2024-02-27 NOTE — PROGRESS NOTES
Medication Therapy Management (MTM) Encounter    ASSESSMENT:                            -----------------------------------------  .    Medication Adherence/Access: Re-enrolled in [employer] for 2024 today.  -----------------------------------------    Afib/CAD: Blood pressure at goal less than 130/80 mmHg. Patient does not have strong indication for ARB other than cardiac remodeling (no microalbuminuria, no MI history).   Tolerating rosuvastatin well. LDL at goal <100mg/dl - continue current dose. Recommend discussing circulation concerns with cardiology - do not believe this to be med related.  .  -----------------------------------------  Type 2 Diabetes: A1c at goal less than 7%.  Metformin optimized at 2 g daily for macrovascular benefit.  Patient would benefit from optimizing GLP-1 for weight management and blood sugar control.  Recommend increasing Ozempic to 2 mg weekly for weight management benefit - patient declines as has significant supply of 1mg pens at home. Education provided on CGM sensor adhesion improvements. Continue aspirin indefinitely. At goal on statin. Ok to continue to hold losartan (see above).     -----------------------------------------    Neuropathy/Back Pain: Stable.  -----------------------------------------  .  Hypothyroidism: Stable.. Last TSH is within normal limits. Do not suspect cold intolerability relating to thyroid. Possible from long haul covid.  -----------------------------------------  .  BPH: stable.    PLAN:                              Continue current meds as prescribed. When you are down to starting your last pen of Ozempic 1 mg, call to get an appointment with the new Medication Therapy Management pharmacist to discuss whether you would benefit from going up to the max dose of Ozempic 2mg weekly for additional weight management benefit.    Try SkinTac Wipes under Jose sensor to help with adhesion. You may also try a Jose 2 patch cover or a large bandaid or Tegaderm  to cover sensor to help it stick better.    Follow-up: with Dr. Sneed as planned. As needed with Medication Therapy Management pharmacist - at a minimum in November to renew [employer] application for Ozempic.    SUBJECTIVE/OBJECTIVE:                          Khari Jamison is a 68 year old male coming in for a follow-up visit. Patient was accompanied by wife, Kenyetta. Today's visit is a follow-up [facility] visit from 11/15/23.     Reason for visit: Medication Therapy Management .    Allergies/ADRs: Reviewed in chart  Past Medical History: Reviewed in chart  Tobacco: He reports that he has never smoked. He has never used smokeless tobacco.  Alcohol: Less than 1 beverages / week  Caffeine: Not currently using    -----------------------------------------    Medication Adherence/Access: Patient receiving Ozempic from patient assistance program - approved for 2024.     -----------------------------------------    Afib/CAD:   Rosuvastatin 5 mg tablet every other day  Aspirin 81 mg once daily     Patient had successful Watchman procedure in July.  No myalgias since starting rosuvastatin. Cardiology stopped losartan due to hypotension. Patient notes continued poor circulation concerns with cool extremitites, has not discussed this with Cardiology.    Patient does not self-monitor blood pressure.    Cholesterol (with statin)   Lab Test 02/20/24     CHOL 152   HDL 40   LDL 92   TRIG 101       Cholesterol (before statin)  Lab Test 03/01/23   CHOL 286*   HDL 43   *   TRIG 187*     BP Readings from Last 3 Encounters:   02/27/24 120/60   02/15/24 112/54   11/15/23 102/60       -----------------------------------------    Type 2 Diabetes:   Metformin 500 mg ER tablets -2 tablets twice daily  Ozempic 1 mg weekly     Denies side effects since switching to Ozempic from Trulicity. Finds Ozempic easier to use than thought at first -- would like to continue this as hopeful it will be better for weight loss. About 4 lb weight loss with  increased dose    Blood sugar monitoring: CGM - difficulty keeping on Sensor    Continuous Glucose Monitoring Results (last from October):   Average Glucose Last 14 Days = 99 mg/dl    Time in Target Last 14 Days:  Above 180 mg/dL: 0%  In target  mg/dL: 97%  Below 70 mg/dL: 3%    Symptoms of low blood sugar? none  Symptoms of high blood sugar? none  Eye exam: up to date -February 2023  Foot exam: due - last September 2022  Diet/Exercise: Patient works to limit carbohydrates/sweets and eat healthfully.  Goal is to manage blood sugar and weight.  Aspirin: Denies side effects  Statin: Rosuvastatin -denies side effects  ACEi/ARB: losartan. -not taking (see above)  Microalbumin (3/21/2023): Normal  A1C (2/20/24): 5.6%           -----------------------------------------  .    Neuropathy/Back Pain:   Pregabalin 75 mg capsule - 1 capsule twice daily  Acetaminophen 650mg ER 2 tabs twice daily     Pregabalin dose is effective, denies dizziness. Acetaminophen regimen helpful for back pain overall. Denies side effects  -----------------------------------------  .  Hypothyroidism:   Levothyroxine 88 mcg daily  Patient is having the following symptoms: hypothyroidism -  cold intolerance, cold extremities.   TSH   Date Value Ref Range Status   02/20/2024 3.51 0.30 - 4.20 uIU/mL Final   05/07/2023 2.03 0.30 - 5.00 uIU/mL Final   -----------------------------------------  .  BPH:  Alfuzosin ER 10 mg once daily    Works well, denies side effects.      Today's Vitals: /60   Pulse 63   Wt (!) 332 lb (150.6 kg)   BMI 42.61 kg/m    ----------------      I spent 60 minutes with this patient today. All changes were made via collaborative practice agreement with Shi Sneed MD. A copy of the visit note was provided to the patient's provider(s).    A summary of these recommendations was given to the patient and sent via [employer].    Nilsa Mata, Pharm D., MPH  MTM Pharmacist - [facility]  Secure Voicemail:  [phone]  In Office: Monday - Thursday         Medication Therapy Recommendations  Type 2 diabetes mellitus without complication, without long-term current use of insulin (H)    Current Medication: Continuous Blood Gluc Sensor (FREESTYLE OLEGARIO 2 SENSOR) MISC   Rationale: Does not understand instructions - Adherence - Adherence   Recommendation: Provide Adherence Intervention   Status: Patient Agreed - Adherence/Education

## 2024-02-27 NOTE — LETTER
"Recommended To-Do List      Prepared on: 02/27/2024       You can get the best results from your medications by completing the items on this \"To-Do List.\"      Bring your To-Do List when you go to your doctor. And, share it with your family or caregivers.    My To-Do List:  What we talked about: What I should do:   The importance of taking your medication as intended    Try SkinTac Wipes under Jose sensor to help with adhesion. You may also try a Jose 2 patch cover or a large bandaid or Tegaderm to cover sensor to help it stick better.          What we talked about: What I should do:                     "

## 2024-02-27 NOTE — LETTER
February 27, 2024  Khari Jamison  [address]  [facility] [address]    Dear Mr. Jamison, [facility]     Thank you for talking with me on Feb 27, 2024 about your health and medications. As a follow-up to our conversation, I have included two documents:      Your Recommended To-Do List has steps you should take to get the best results from your medications.  Your Medication List will help you keep track of your medications and how to take them.    If you want to talk about these documents, please call Nilsa Mata RPH at phone: [phone], Monday-Friday 8-4:30pm.    I look forward to working with you and your doctors to make sure your medications work well for you.    Sincerely,  Nilsa Mata RPH  [facility] Pharmacist, [facility]

## 2024-02-27 NOTE — LETTER
_  Medication List        Prepared on: 02/27/2024     Bring your Medication List when you go to the doctor, hospital, or   emergency room. And, share it with your family or caregivers.     Note any changes to how you take your medications.  Cross out medications when you no longer use them.    Medication How I take it Why I use it Prescriber   acetaminophen (TYLENOL) 650 MG CR tablet Take 650 mg by mouth every 8 hours as needed for mild pain or fever  pain Patient Reported   alfuzosin ER (UROXATRAL) 10 MG 24 hr tablet Take 10 mg by mouth daily BPH Shi Sneed MD   aspirin 81 MG EC tablet Take 1 tablet (81 mg) by mouth daily Presence of Watchman left atrial appendage closure device Yamilka Beltran PA-C   Continuous Blood Gluc Sensor (FREESTYLE OLEGARIO 2 SENSOR) MISC 1 each every 14 days Change every 14 days. Diabetes  Shi Sneed MD    cyanocobalamin (CYANOCOBALAMIN) 1000 MCG/ML injection Inject 1 mL into the muscle every 30 days Vitamin B12 Deficiency Shi Sneed MD    levothyroxine (SYNTHROID, LEVOTHROID) 88 MCG tablet Take 88 mcg by mouth At Bedtime hypothyroidism Shi Sneed MD    metFORMIN (GLUCOPHAGE XR) 500 MG 24 hr tablet Take 1,000 mg by mouth 2 times daily (with meals) Diabetes  Shi Sneed MD    pregabalin (LYRICA) 75 MG capsule Take 75 mg by mouth 3 times daily Neuropathy Shi Sneed MD    rosuvastatin (CRESTOR) 5 MG tablet Take 5 mg by mouth every other day Cholesterol and heart health Shi Sneed MD    Semaglutide, 1 MG/DOSE, (OZEMPIC, 1 MG/DOSE,) 4 MG/3ML pen Inject 1 mg Subcutaneous every 7 days Diabetes  Shi Sneed MD    triamcinolone (NASACORT) 55 MCG/ACT nasal aerosol Spray 2 sprays into both nostrils daily as needed Congestion Patient Reported   Vitamin D3 (VITAMIN D, CHOLECALCIFEROL,) 25 mcg (1000 units) tablet Take 1 tablet by mouth every morning Bone Health and Mood Shi Sneed MD           Add new medications, over-the-counter drugs, herbals, vitamins, or  minerals in the  blank rows below.    Medication How I take it Why I use it Prescriber                                      Allergies:      ezetimibe; simvastatin        Side effects I have had:               Other Information:              My notes and questions:

## 2024-03-23 ENCOUNTER — HEALTH MAINTENANCE LETTER (OUTPATIENT)
Age: 69
End: 2024-03-23

## 2024-06-05 ENCOUNTER — TRANSFERRED RECORDS (OUTPATIENT)
Dept: HEALTH INFORMATION MANAGEMENT | Facility: CLINIC | Age: 69
End: 2024-06-05

## 2024-06-20 NOTE — PROGRESS NOTES
Medication Therapy Management (MTM) Encounter    ASSESSMENT:                            Medication Adherence/Access: No issues reported. Will call patient when next Ozempic order arrives    Type 2 Diabetes: A1c at goal less than 7%.  Metformin optimized at 2 g daily for macrovascular benefit.  Patient would benefit from optimizing GLP-1 for weight management and blood sugar control.  Recommend increasing Ozempic to 2 mg weekly for weight management benefit - patient declines due to a fear of inducing GI side effects and optimal control he is currently seeing with his blood sugar. Discussed switching from Freestyle Jose 2 to 3 so that the patient would no longer have to scan the sensor throughout the day. The sensor is also smaller, so may be easier to keep on.    PLAN:                              Continue current meds as prescribed. If you feel you wish to see more weight loss or blood sugars start to creep up, we can increase your Ozempic dosing to the 2 mg.    I will send in a prescription for Freestlye Jose 3 sensors to your pharmacy. This will need a new silviano on your phone, so make sure to download that.      Follow-up: As needed with MTM. Make an appointment to link your Freestlye Jose 3 to the clinic once you receive them. We need to meet in November as well to reapply for the Ozempic patient assistance program.    Stanislav Martin, PharmD, FLACA, BCACP  Medication Therapy Management Pharmacist     SUBJECTIVE/OBJECTIVE:                          Khari Jamison is a 69 year old male coming in for a follow-up visit. Patient was accompanied by wife, Kenyetta. Today's visit is a follow-up MTM visit from 11/15/23.     Reason for visit: Medication Therapy Management .    Allergies/ADRs: Reviewed in chart  Past Medical History: Reviewed in chart  Tobacco: He reports that he has never smoked. He has never used smokeless tobacco.  Alcohol: Less than 1 beverages / week  Caffeine: Not currently using      Medication  Adherence/Access: Patient receiving Ozempic from patient assistance program - approved for 2024. Hs three doses left. Medication was ordered previously and should be delivered to the clinic in the next few days.      Type 2 Diabetes:   Metformin 500 mg ER tablets -2 tablets twice daily  Ozempic 1 mg weekly     Denies side effects. He would like to continue this as hopeful it will be better for weight loss. He is concerned about having side effects with further increasing the dose.    Blood sugar monitoring: CGM - difficulty keeping on Sensor. Patient does not like having to scan multiple times per day.  Did not bring meter today, but states blood sugars have stayed in the desired range most of the time.  Symptoms of low blood sugar? none  Symptoms of high blood sugar? none  Eye exam: up to date -February 2024  Foot exam: States up to date, Dr. Sneed checks  Diet/Exercise: Patient works to limit carbohydrates/sweets and eat healthfully.  Goal is to manage blood sugar and weight.  Microalbumin (3/21/2023): Normal  A1C (2/20/24): 5.6%      Today's Vitals: There were no vitals taken for this visit.  ----------------      I spent 30 minutes with this patient today. All changes were made via collaborative practice agreement with Shi Sneed MD. A copy of the visit note was provided to the patient's provider(s).    A summary of these recommendations was given to the patient and sent via [email or web address].    Stanislav Martin, PharmD, FLACA, BCACP  Medication Therapy Management Pharmacist        Medication Therapy Recommendations  Type 2 diabetes mellitus without complication, without long-term current use of insulin (H)    Current Medication: Continuous Blood Gluc Sensor (FREESTYLE JOSE 2 SENSOR) MISC   Rationale: More effective medication available - Ineffective medication - Effectiveness   Recommendation: Change Medication - FreeStyle Jose 3 Sensor Misc   Status: Accepted per CPA

## 2024-06-21 ENCOUNTER — OFFICE VISIT (OUTPATIENT)
Dept: PHARMACY | Facility: PHYSICIAN GROUP | Age: 69
End: 2024-06-21
Payer: COMMERCIAL

## 2024-06-21 DIAGNOSIS — E11.9 TYPE 2 DIABETES MELLITUS WITHOUT COMPLICATION, WITHOUT LONG-TERM CURRENT USE OF INSULIN (H): Primary | ICD-10-CM

## 2024-06-21 PROCEDURE — 99606 MTMS BY PHARM EST 15 MIN: CPT | Performed by: PHARMACIST

## 2024-06-21 PROCEDURE — 99607 MTMS BY PHARM ADDL 15 MIN: CPT | Performed by: PHARMACIST

## 2024-06-21 RX ORDER — BLOOD-GLUCOSE SENSOR
1 EACH MISCELLANEOUS
COMMUNITY

## 2024-06-21 NOTE — PATIENT INSTRUCTIONS
"Recommendations from today's MTM visit:                                                         Continue current meds as prescribed. If you feel you wish to see more weight loss or blood sugars start to creep up, we can increase your Ozempic dosing to the 2 mg.    I will send in a prescription for Freestlye Jose 3 sensors to your pharmacy. This will need a new silviano on your phone, so make sure to download that.      Follow-up: As needed with MTM. Make an appointment to link your Freestlye Jose 3 to the clinic once you receive them. We need to meet in November as well to reapply for the Ozempic patient assistance program.    It was great speaking with you today.  I value your experience and would be very thankful for your time in providing feedback in our clinic survey. In the next few days, you may receive an email or text message from [employer] with a link to a survey related to your  clinical pharmacist.\"     To schedule another MTM appointment, please call the clinic directly or you may call the MTM scheduling line at [phone].    My Clinical Pharmacist's contact information:                                                      Please feel free to contact me with any questions or concerns you have.      Stanislav Martin, Shazia, FLACA, BCACP  Medication Therapy Management Pharmacist    "

## 2024-08-05 ENCOUNTER — OFFICE VISIT (OUTPATIENT)
Dept: CARDIOLOGY | Facility: CLINIC | Age: 69
End: 2024-08-05
Payer: COMMERCIAL

## 2024-08-05 VITALS
OXYGEN SATURATION: 96 % | DIASTOLIC BLOOD PRESSURE: 74 MMHG | RESPIRATION RATE: 18 BRPM | WEIGHT: 315 LBS | HEART RATE: 58 BPM | SYSTOLIC BLOOD PRESSURE: 126 MMHG | BODY MASS INDEX: 42.22 KG/M2

## 2024-08-05 DIAGNOSIS — Z95.818 PRESENCE OF WATCHMAN LEFT ATRIAL APPENDAGE CLOSURE DEVICE: Primary | ICD-10-CM

## 2024-08-05 DIAGNOSIS — E78.5 HYPERLIPIDEMIA LDL GOAL <100: ICD-10-CM

## 2024-08-05 DIAGNOSIS — I10 ESSENTIAL HYPERTENSION: ICD-10-CM

## 2024-08-05 DIAGNOSIS — I48.19 PERSISTENT ATRIAL FIBRILLATION (H): ICD-10-CM

## 2024-08-05 DIAGNOSIS — I48.4 ATYPICAL ATRIAL FLUTTER (H): ICD-10-CM

## 2024-08-05 PROCEDURE — 99214 OFFICE O/P EST MOD 30 MIN: CPT | Performed by: PHYSICIAN ASSISTANT

## 2024-08-05 PROCEDURE — G2211 COMPLEX E/M VISIT ADD ON: HCPCS | Performed by: PHYSICIAN ASSISTANT

## 2024-08-05 NOTE — PATIENT INSTRUCTIONS
Khari Jamison,    It was a pleasure to see you today in the clinic regarding your Watchman follow-up.     My recommendations after this visit include:     - no medication changes today, continue aspirin 81 mg daily indefinitely    You should followup with Ep in 1 year, or sooner if needed        If you have questions or concerns, please call using the numbers below:    [facility] Phone   [phone]    After Hours/Scheduling  [phone]    Otherwise you can dial the nurse directly at:    TWILA Valverde  [phone]    Elzbieta Ruiz PA-C  Structural Heart Program  [facility]

## 2024-08-05 NOTE — LETTER
8/5/2024    Shi Sneed MD  [address] [address]    RE: Khari Jamison       Dear Colleague,     I had the pleasure of seeing Khari Jamison in the [facility] [place] [facility].  HEART CARE ENCOUNTER NOTE       [facility]  [phone]      Assessment/Recommendations   1. Persistent atrial fibrillation: Status post LAAO 7/27/2023.  Continue aspirin 81 mg daily indefinitely. Recommended follow-up with EP in 6 months, patient prefers to wait 1 year.    MODIFIED MEETA SCALE   Timepoint: 1yr Post-LAAC    Previous score: 0    Score Description   0 No symptoms at all   1 No significant disability despite symptoms; able to carry out all usual duties and activities   2 Slight disability; unable to carry out all previous activities, but able to look after own affairs without assistance   3 Moderate disability; requiring some help, but able to walk without assistance   4 Moderately severe disability; unable to walk without assistance and unable to attend to own bodily needs without assistance   5 Severe disability; bedridden, incontinent and requiring constant nursing care and attention   6 Dead    Total score (0 - 6):  0    Change in score if s/p LAAC? No      2.  Hypertension -blood pressure is controlled    3.  Hyperlipidemia -most recent LDL 92.  Continue statin therapy    The longitudinal plan of care for the diagnosis(es)/condition(s) as documented were addressed during this visit. Due to the added complexity in care, we will continue to support Khari in the subsequent management and with ongoing continuity of care.        History of Present Illness/Subjective    Khari Jamison is a 69 year old male who comes in today for 1 year follow-up after watchman. He is accompanied by his wife for today's visit    Khari Jamison has a past history of persistent atrial fibrillation (s/p Watchman July 2023), typical atrial flutter hypertension, hyperlipidemia, obstructive  "sleep apnea, type 2 diabetes mellitus.     Denies stroke since Watchman implant. He does have a new floater in his left for that past month. He has follow-up with his eye doctor tomorrow. He reports ongoing dizziness and lightheadedness since being diagnosed with covid. He reports occasional \"flutters\" in his chest.    He chest discomfort, palpitations, shortness of breath, paroxysmal nocturnal dyspnea, dizziness, pre-syncope, or syncope.      Medical, surgical, family, social history, and medications were reviewed and updated as necessary.    ECHO results (from 11/8/2023):  Interpretation Summary     TRANSESOPHAGEAL ECHOCARDIOGRAM     1. Normal left ventricular size and systolic performance with a visually  estimated ejection fraction of 60%.  2. No significant valvular heart disease is identified on this study.  3. Normal right ventricular size and systolic performance.  4. There is mild to moderate left atrial enlargement.  5. There is a left atrial appendage occlusion device. The device appears well-  placed and seated.  Â  No thrombus is detected upon the surface of the device.  Â  No flow was detected around the device.  6. No residual postprocedural atrial communication is identified.  7. Echo contrast examination was performed using agitated NS as contrast  agent. The right heart opacity was good and the left heart was well  visualized. There was no evidence of right to left shunting during spontaneous  respiration or following release of Valsalva.         Physical Examination Review of Systems   Vitals: /74 (BP Location: Left arm, Patient Position: Sitting, Cuff Size: Adult Large)   Pulse 58   Resp 18   Wt 149.2 kg (329 lb)   SpO2 96%   BMI 42.22 kg/m    BMI= Body mass index is 42.22 kg/m .  Wt Readings from Last 3 Encounters:   08/05/24 149.2 kg (329 lb)   02/27/24 (!) 150.6 kg (332 lb)   02/15/24 (!) 152 kg (335 lb)       General Appearance:   Alert, cooperative and in no acute distress "   ENT/Mouth: membranes moist, no oral lesions or bleeding gums.      EYES:  no scleral icterus, normal conjunctivae   Neck: Thyroid not visualized   Chest/Lungs:   lungs are clear to auscultation, no rales or wheezing   Cardiovascular:   Irregular . Normal first and second heart sounds with no murmurs, rubs or gallops; the carotid, radial and posterior tibial pulses are intact, no edema bilaterally    Abdomen:  Soft and nontender. Bowel sounds are present in all quadrants   Extremities: no cyanosis or clubbing   Skin: no xanthelasma, warm.    Neurologic: normal gait, normal  bilateral, no tremors   Psychiatric: Normal mood and affect       Please refer above for cardiac ROS details.      Medical History  Surgical History Family History Social History   Past Medical History:   Diagnosis Date     Arrhythmia     a-fib     Atrial fibrillation (H)      Back pain      Back pain      Diabetes mellitus (H)      Hyperlipemia      Kidney stone      Obesity      GASTON (obstructive sleep apnea)     CPAP     PONV (postoperative nausea and vomiting)     postoperatively with oral pain meds     Status post ablation of atrial fibrillation 1/27/2016    PVI Sept 2014 (cryo-PVI + roof line + SAMUEL line)     Past Surgical History:   Procedure Laterality Date     CARDIAC ELECTROPHYSIOLOGY MAPPING AND ABLATION  1/27/16    pvi     CARDIOVERSION  10/31/14     CARDIOVERSION  05/16/2016     COLONOSCOPY N/A 11/18/2015    Procedure: COLONOSCOPY WITH POLYPECTOMY USING BIOPSY FORCEP;  Surgeon: Coco Vallecillo MD;  Location: [facility] GI;  Service:      COMBINED CYSTOSCOPY, INSERT STENT URETER(S) Bilateral 8/20/2015    Procedure: CYSTOSCOPY, BILATERAL STENT REMOVAL, LEFT URETEROSCOPY, STONE EXTRACTION, LEFT STENT INSERTION;  Surgeon: Ansley Jeffery MD;  Location: [facility] OR;  Service:      CV LEFT ATRIAL APPENDAGE CLOSURE Right 7/27/2023    Procedure: Left Atrial Appendage Closure;  Surgeon: Judy Tapia MD;  Location:   [facility] CV     CYSTOSCOPY W/ LASER LITHOTRIPSY  aug 2015     OTHER SURGICAL HISTORY  9/4/14    pulmonary vein isolation     OTHER SURGICAL HISTORY      eyelid lift     TX ABLATE HEART DYSRHYTHM FOCUS      Description: Catheter Ablation Atrial Fibrillation;  Recorded: 10/08/2014;  Comments: 9/4/14 PVI Cyro to 4 PVs; Roof and SAMUEL lines done.     TX CARDIOVERSION ELECTIVE ARRHYTHMIA EXTERNAL  09/11/2015    Description: Elective Cardioversion External;  Recorded: 03/06/2014;  Comments: 10/11/13; ; 11/27/13 and reverted to afib after 11 days on mod dose sotalol.; ; 1/3/14  sinus on Amio     TX CARDIOVERSION ELECTIVE ARRHYTHMIA EXTERNAL      Description: Elective Cardioversion External;  Recorded: 11/19/2014;  Comments: 10/11/13; ; 11/27/13 and reverted to afib after 11 days on mod dose sotalol.; ; 1/3/14  sinus on Amio.  10/31/14     TX CYSTO/URETERO W/LITHOTRIPSY &INDWELL STENT INSRT Right 7/16/2019    Procedure: CYSTOSCOPY RIGHT URETEROSCOPY, LASER  LITHOTRIPSY STENT INSERTION;  Surgeon: Kingston Kurtz MD;  Location: [facility];  Service: Urology     REMOVAL OF SPERM DUCT(S)      Description: Surgery Of Male Genitalia Vasectomy;  Recorded: 10/29/2013;     TONSILLECTOMY       VASECTOMY       Family History   Problem Relation Age of Onset     Cancer Mother         uterine     Diabetes Maternal Aunt      Urolithiasis No family hx of      Clotting Disorder No family hx of      Gout No family hx of      Heart Disease No family hx of      Anesthesia Reaction No family hx of     Social History     Socioeconomic History     Marital status:      Spouse name: Not on file     Number of children: Not on file     Years of education: Not on file     Highest education level: Not on file   Occupational History     Not on file   Tobacco Use     Smoking status: Never     Smokeless tobacco: Never   Substance and Sexual Activity     Alcohol use: No     Alcohol/week: 1.0 standard drink of alcohol     Drug use: No      Sexual activity: Not on file   Other Topics Concern     Not on file   Social History Narrative     Not on file     Social Determinants of Health     Financial Resource Strain: Not on file   Food Insecurity: Not on file   Transportation Needs: Not on file   Physical Activity: Not on file   Stress: Not on file   Social Connections: Unknown (5/8/2024)    Received [facility]    Social Connections      Frequency of Communication with Friends and Family: Not on file   Interpersonal Safety: Not on file   Housing Stability: Not on file          Medications  Allergies   Current Outpatient Medications   Medication Sig Dispense Refill     acetaminophen (TYLENOL) 650 MG CR tablet Take 650 mg by mouth every 8 hours as needed for mild pain or fever       alfuzosin ER (UROXATRAL) 10 MG 24 hr tablet Take 10 mg by mouth daily       aspirin 81 MG EC tablet Take 1 tablet (81 mg) by mouth daily       Continuous Glucose Sensor (FREESTYLE OLEGARIO 3 SENSOR) MISC 1 Device every 14 days       cyanocobalamin (CYANOCOBALAMIN) 1000 MCG/ML injection Inject 1 mL into the muscle every 30 days       levothyroxine (SYNTHROID, LEVOTHROID) 88 MCG tablet Take 88 mcg by mouth At Bedtime       metFORMIN (GLUCOPHAGE XR) 500 MG 24 hr tablet Take 1,000 mg by mouth 2 times daily (with meals)       pregabalin (LYRICA) 75 MG capsule Take 75 mg by mouth 2 times daily       rosuvastatin (CRESTOR) 5 MG tablet Take 5 mg by mouth every other day       Semaglutide, 1 MG/DOSE, (OZEMPIC, 1 MG/DOSE,) 4 MG/3ML pen Inject 1 mg Subcutaneous every 7 days       triamcinolone (NASACORT) 55 MCG/ACT nasal aerosol Spray 2 sprays into both nostrils daily as needed       Vitamin D3 (VITAMIN D, CHOLECALCIFEROL,) 25 mcg (1000 units) tablet Take 1 tablet by mouth every morning      Allergies   Allergen Reactions     Ezetimibe GI Disturbance     Simvastatin      Other reaction(s): myalgias, arthralgias         Lab Results    Chemistry/lipid  "CBC Cardiac Enzymes/BNP/TSH/INR   Recent Labs   Lab Test 02/20/24  1036   CHOL 152   HDL 40   LDL 92   TRIG 101     Recent Labs   Lab Test 02/20/24  1036 03/01/23  1319 01/04/22  0940   LDL 92 206* 143*     Recent Labs   Lab Test 02/20/24  1036      POTASSIUM 4.8   CHLORIDE 103   CO2 28   *   BUN 11.3   CR 1.05   GFRESTIMATED 77   ASHLEIGH 9.5     Recent Labs   Lab Test 02/20/24  1036 10/30/23  0954 09/26/23  2312   CR 1.05 1.17 0.92     Recent Labs   Lab Test 05/08/23  0425   A1C 6.5*    Recent Labs   Lab Test 09/26/23  2312   WBC 9.8   HGB 13.8   HCT 42.0   MCV 90        Recent Labs   Lab Test 09/26/23  2312 07/27/23  0956 07/24/23  1032   HGB 13.8 14.0 15.2    No results for input(s): \"TROPONINI\" in the last 07026 hours.  Recent Labs   Lab Test 09/26/23  2312 05/07/23  1944   BNP  --  110*   NTBNP 741  --      Recent Labs   Lab Test 02/20/24  1036   TSH 3.51     Recent Labs   Lab Test 05/08/23  0421   INR 1.06        20 minutes spent on the date of encounter doing education, chart prep/review, review of outside records, review of test results, interpretation with above tests, patient visit, documentation, and discussion with family.      This note has been dictated using voice recognition software. Any grammatical or context distortions are unintentional and inherent to the software.    Elzbieta Ruiz PA-C  Structural Heart Program  [facility]       Thank you for allowing me to participate in the care of your patient.      Sincerely,     Elzbieta Ruiz PA-C     [facility]  cc:   No referring provider defined for this encounter.      "

## 2024-08-05 NOTE — PROGRESS NOTES
HEART CARE ENCOUNTER NOTE       [facility]  [phone]      Assessment/Recommendations   1. Persistent atrial fibrillation: Status post LAAO 7/27/2023.  Continue aspirin 81 mg daily indefinitely. Recommended follow-up with EP in 6 months, patient prefers to wait 1 year.    MODIFIED MEETA SCALE   Timepoint: 1yr Post-LAAC    Previous score: 0    Score Description   0 No symptoms at all   1 No significant disability despite symptoms; able to carry out all usual duties and activities   2 Slight disability; unable to carry out all previous activities, but able to look after own affairs without assistance   3 Moderate disability; requiring some help, but able to walk without assistance   4 Moderately severe disability; unable to walk without assistance and unable to attend to own bodily needs without assistance   5 Severe disability; bedridden, incontinent and requiring constant nursing care and attention   6 Dead    Total score (0 - 6):  0    Change in score if s/p LAAC? No      2.  Hypertension -blood pressure is controlled    3.  Hyperlipidemia -most recent LDL 92.  Continue statin therapy    The longitudinal plan of care for the diagnosis(es)/condition(s) as documented were addressed during this visit. Due to the added complexity in care, we will continue to support Khari in the subsequent management and with ongoing continuity of care.        History of Present Illness/Subjective    Khari Jamison is a 69 year old male who comes in today for 1 year follow-up after watchman. He is accompanied by his wife for today's visit    Khari Jamison has a past history of persistent atrial fibrillation (s/p Watchman July 2023), typical atrial flutter hypertension, hyperlipidemia, obstructive sleep apnea, type 2 diabetes mellitus.     Denies stroke since Watchman implant. He does have a new floater in his left for that past month. He has follow-up with his eye doctor tomorrow. He reports ongoing  "dizziness and lightheadedness since being diagnosed with covid. He reports occasional \"flutters\" in his chest.    He chest discomfort, palpitations, shortness of breath, paroxysmal nocturnal dyspnea, dizziness, pre-syncope, or syncope.      Medical, surgical, family, social history, and medications were reviewed and updated as necessary.    ECHO results (from 11/8/2023):  Interpretation Summary     TRANSESOPHAGEAL ECHOCARDIOGRAM     1. Normal left ventricular size and systolic performance with a visually  estimated ejection fraction of 60%.  2. No significant valvular heart disease is identified on this study.  3. Normal right ventricular size and systolic performance.  4. There is mild to moderate left atrial enlargement.  5. There is a left atrial appendage occlusion device. The device appears well-  placed and seated.  Â  No thrombus is detected upon the surface of the device.  Â  No flow was detected around the device.  6. No residual postprocedural atrial communication is identified.  7. Echo contrast examination was performed using agitated NS as contrast  agent. The right heart opacity was good and the left heart was well  visualized. There was no evidence of right to left shunting during spontaneous  respiration or following release of Valsalva.         Physical Examination Review of Systems   Vitals: /74 (BP Location: Left arm, Patient Position: Sitting, Cuff Size: Adult Large)   Pulse 58   Resp 18   Wt 149.2 kg (329 lb)   SpO2 96%   BMI 42.22 kg/m    BMI= Body mass index is 42.22 kg/m .  Wt Readings from Last 3 Encounters:   08/05/24 149.2 kg (329 lb)   02/27/24 (!) 150.6 kg (332 lb)   02/15/24 (!) 152 kg (335 lb)       General Appearance:   Alert, cooperative and in no acute distress   ENT/Mouth: membranes moist, no oral lesions or bleeding gums.      EYES:  no scleral icterus, normal conjunctivae   Neck: Thyroid not visualized   Chest/Lungs:   lungs are clear to auscultation, no rales or " wheezing   Cardiovascular:   Irregular . Normal first and second heart sounds with no murmurs, rubs or gallops; the carotid, radial and posterior tibial pulses are intact, no edema bilaterally    Abdomen:  Soft and nontender. Bowel sounds are present in all quadrants   Extremities: no cyanosis or clubbing   Skin: no xanthelasma, warm.    Neurologic: normal gait, normal  bilateral, no tremors   Psychiatric: Normal mood and affect       Please refer above for cardiac ROS details.      Medical History  Surgical History Family History Social History   Past Medical History:   Diagnosis Date    Arrhythmia     a-fib    Atrial fibrillation (H)     Back pain     Back pain     Diabetes mellitus (H)     Hyperlipemia     Kidney stone     Obesity     GASTON (obstructive sleep apnea)     CPAP    PONV (postoperative nausea and vomiting)     postoperatively with oral pain meds    Status post ablation of atrial fibrillation 1/27/2016    PVI Sept 2014 (cryo-PVI + roof line + SAMUEL line)     Past Surgical History:   Procedure Laterality Date    CARDIAC ELECTROPHYSIOLOGY MAPPING AND ABLATION  1/27/16    pvi    CARDIOVERSION  10/31/14    CARDIOVERSION  05/16/2016    COLONOSCOPY N/A 11/18/2015    Procedure: COLONOSCOPY WITH POLYPECTOMY USING BIOPSY FORCEP;  Surgeon: Coco Vallecillo MD;  Location: [facility] GI;  Service:     COMBINED CYSTOSCOPY, INSERT STENT URETER(S) Bilateral 8/20/2015    Procedure: CYSTOSCOPY, BILATERAL STENT REMOVAL, LEFT URETEROSCOPY, STONE EXTRACTION, LEFT STENT INSERTION;  Surgeon: Ansley Jeffery MD;  Location: [facility] OR;  Service:     CV LEFT ATRIAL APPENDAGE CLOSURE Right 7/27/2023    Procedure: Left Atrial Appendage Closure;  Surgeon: Judy Tapia MD;  Location: [facility] CATH LAB CV    CYSTOSCOPY W/ LASER LITHOTRIPSY  aug 2015    OTHER SURGICAL HISTORY  9/4/14    pulmonary vein isolation    OTHER SURGICAL HISTORY      eyelid lift    DC ABLATE HEART DYSRHYTHM FOCUS      Description: Catheter  Ablation Atrial Fibrillation;  Recorded: 10/08/2014;  Comments: 9/4/14 PVI Cyro to 4 PVs; Roof and SAMUEL lines done.    CO CARDIOVERSION ELECTIVE ARRHYTHMIA EXTERNAL  09/11/2015    Description: Elective Cardioversion External;  Recorded: 03/06/2014;  Comments: 10/11/13; ; 11/27/13 and reverted to afib after 11 days on mod dose sotalol.; ; 1/3/14  sinus on Amio    CO CARDIOVERSION ELECTIVE ARRHYTHMIA EXTERNAL      Description: Elective Cardioversion External;  Recorded: 11/19/2014;  Comments: 10/11/13; ; 11/27/13 and reverted to afib after 11 days on mod dose sotalol.; ; 1/3/14  sinus on Amio.  10/31/14    CO CYSTO/URETERO W/LITHOTRIPSY &INDWELL STENT INSRT Right 7/16/2019    Procedure: CYSTOSCOPY RIGHT URETEROSCOPY, LASER  LITHOTRIPSY STENT INSERTION;  Surgeon: Kingston Kurtz MD;  Location: [facility];  Service: Urology    REMOVAL OF SPERM DUCT(S)      Description: Surgery Of Male Genitalia Vasectomy;  Recorded: 10/29/2013;    TONSILLECTOMY      VASECTOMY       Family History   Problem Relation Age of Onset    Cancer Mother         uterine    Diabetes Maternal Aunt     Urolithiasis No family hx of     Clotting Disorder No family hx of     Gout No family hx of     Heart Disease No family hx of     Anesthesia Reaction No family hx of     Social History     Socioeconomic History    Marital status:      Spouse name: Not on file    Number of children: Not on file    Years of education: Not on file    Highest education level: Not on file   Occupational History    Not on file   Tobacco Use    Smoking status: Never    Smokeless tobacco: Never   Substance and Sexual Activity    Alcohol use: No     Alcohol/week: 1.0 standard drink of alcohol    Drug use: No    Sexual activity: Not on file   Other Topics Concern    Not on file   Social History Narrative    Not on file     Social Determinants of Health     Financial Resource Strain: Not on file   Food Insecurity: Not on file   Transportation Needs: Not on file    Physical Activity: Not on file   Stress: Not on file   Social Connections: Unknown (5/8/2024)    Received from [facility]    Social Connections     Frequency of Communication with Friends and Family: Not on file   Interpersonal Safety: Not on file   Housing Stability: Not on file          Medications  Allergies   Current Outpatient Medications   Medication Sig Dispense Refill    acetaminophen (TYLENOL) 650 MG CR tablet Take 650 mg by mouth every 8 hours as needed for mild pain or fever      alfuzosin ER (UROXATRAL) 10 MG 24 hr tablet Take 10 mg by mouth daily      aspirin 81 MG EC tablet Take 1 tablet (81 mg) by mouth daily      Continuous Glucose Sensor (FREESTYLE OLEGARIO 3 SENSOR) MISC 1 Device every 14 days      cyanocobalamin (CYANOCOBALAMIN) 1000 MCG/ML injection Inject 1 mL into the muscle every 30 days      levothyroxine (SYNTHROID, LEVOTHROID) 88 MCG tablet Take 88 mcg by mouth At Bedtime      metFORMIN (GLUCOPHAGE XR) 500 MG 24 hr tablet Take 1,000 mg by mouth 2 times daily (with meals)      pregabalin (LYRICA) 75 MG capsule Take 75 mg by mouth 2 times daily      rosuvastatin (CRESTOR) 5 MG tablet Take 5 mg by mouth every other day      Semaglutide, 1 MG/DOSE, (OZEMPIC, 1 MG/DOSE,) 4 MG/3ML pen Inject 1 mg Subcutaneous every 7 days      triamcinolone (NASACORT) 55 MCG/ACT nasal aerosol Spray 2 sprays into both nostrils daily as needed      Vitamin D3 (VITAMIN D, CHOLECALCIFEROL,) 25 mcg (1000 units) tablet Take 1 tablet by mouth every morning      Allergies   Allergen Reactions    Ezetimibe GI Disturbance    Simvastatin      Other reaction(s): myalgias, arthralgias         Lab Results    Chemistry/lipid CBC Cardiac Enzymes/BNP/TSH/INR   Recent Labs   Lab Test 02/20/24  1036   CHOL 152   HDL 40   LDL 92   TRIG 101     Recent Labs   Lab Test 02/20/24  1036 03/01/23  1319 01/04/22  0940   LDL 92 206* 143*     Recent Labs   Lab Test 02/20/24  1036      POTASSIUM 4.8  "  CHLORIDE 103   CO2 28   *   BUN 11.3   CR 1.05   GFRESTIMATED 77   ASHLEIGH 9.5     Recent Labs   Lab Test 02/20/24  1036 10/30/23  0954 09/26/23  2312   CR 1.05 1.17 0.92     Recent Labs   Lab Test 05/08/23  0425   A1C 6.5*    Recent Labs   Lab Test 09/26/23  2312   WBC 9.8   HGB 13.8   HCT 42.0   MCV 90        Recent Labs   Lab Test 09/26/23  2312 07/27/23  0956 07/24/23  1032   HGB 13.8 14.0 15.2    No results for input(s): \"TROPONINI\" in the last 70461 hours.  Recent Labs   Lab Test 09/26/23 2312 05/07/23  1944   BNP  --  110*   NTBNP 741  --      Recent Labs   Lab Test 02/20/24  1036   TSH 3.51     Recent Labs   Lab Test 05/08/23  0421   INR 1.06        20 minutes spent on the date of encounter doing education, chart prep/review, review of outside records, review of test results, interpretation with above tests, patient visit, documentation, and discussion with family.      This note has been dictated using voice recognition software. Any grammatical or context distortions are unintentional and inherent to the software.    Elzbieta Ruiz PA-C  Structural Heart Program  [facility]   "

## 2024-08-10 ENCOUNTER — HEALTH MAINTENANCE LETTER (OUTPATIENT)
Age: 69
End: 2024-08-10

## 2024-08-20 ENCOUNTER — LAB REQUISITION (OUTPATIENT)
Dept: LAB | Facility: CLINIC | Age: 69
End: 2024-08-20
Payer: COMMERCIAL

## 2024-08-20 DIAGNOSIS — E11.40 TYPE 2 DIABETES MELLITUS WITH DIABETIC NEUROPATHY, UNSPECIFIED (H): ICD-10-CM

## 2024-08-20 DIAGNOSIS — E03.9 HYPOTHYROIDISM, UNSPECIFIED: ICD-10-CM

## 2024-08-20 PROCEDURE — 84443 ASSAY THYROID STIM HORMONE: CPT | Performed by: FAMILY MEDICINE

## 2024-08-20 PROCEDURE — 80048 BASIC METABOLIC PNL TOTAL CA: CPT | Performed by: FAMILY MEDICINE

## 2024-08-21 LAB
ANION GAP SERPL CALCULATED.3IONS-SCNC: 12 MMOL/L (ref 7–15)
BUN SERPL-MCNC: 16 MG/DL (ref 8–23)
CALCIUM SERPL-MCNC: 9.8 MG/DL (ref 8.8–10.4)
CHLORIDE SERPL-SCNC: 102 MMOL/L (ref 98–107)
CREAT SERPL-MCNC: 1.11 MG/DL (ref 0.67–1.17)
EGFRCR SERPLBLD CKD-EPI 2021: 72 ML/MIN/1.73M2
GLUCOSE SERPL-MCNC: 88 MG/DL (ref 70–99)
HCO3 SERPL-SCNC: 26 MMOL/L (ref 22–29)
POTASSIUM SERPL-SCNC: 4.8 MMOL/L (ref 3.4–5.3)
SODIUM SERPL-SCNC: 140 MMOL/L (ref 135–145)
TSH SERPL DL<=0.005 MIU/L-ACNC: 2.38 UIU/ML (ref 0.3–4.2)

## 2024-10-19 ENCOUNTER — HEALTH MAINTENANCE LETTER (OUTPATIENT)
Age: 69
End: 2024-10-19

## 2025-01-31 ENCOUNTER — TRANSFERRED RECORDS (OUTPATIENT)
Dept: HEALTH INFORMATION MANAGEMENT | Facility: CLINIC | Age: 70
End: 2025-01-31

## 2025-01-31 ENCOUNTER — LAB REQUISITION (OUTPATIENT)
Dept: LAB | Facility: CLINIC | Age: 70
End: 2025-01-31
Payer: COMMERCIAL

## 2025-01-31 DIAGNOSIS — E78.2 MIXED HYPERLIPIDEMIA: ICD-10-CM

## 2025-01-31 DIAGNOSIS — E11.40 TYPE 2 DIABETES MELLITUS WITH DIABETIC NEUROPATHY, UNSPECIFIED (H): ICD-10-CM

## 2025-01-31 LAB
ANION GAP SERPL CALCULATED.3IONS-SCNC: 14 MMOL/L (ref 7–15)
BUN SERPL-MCNC: 14.4 MG/DL (ref 8–23)
CALCIUM SERPL-MCNC: 10.2 MG/DL (ref 8.8–10.4)
CHLORIDE SERPL-SCNC: 103 MMOL/L (ref 98–107)
CHOLEST SERPL-MCNC: 159 MG/DL
CREAT SERPL-MCNC: 1.08 MG/DL (ref 0.67–1.17)
EGFRCR SERPLBLD CKD-EPI 2021: 74 ML/MIN/1.73M2
FASTING STATUS PATIENT QL REPORTED: YES
FASTING STATUS PATIENT QL REPORTED: YES
GLUCOSE SERPL-MCNC: 100 MG/DL (ref 70–99)
HBA1C MFR BLD: 5.6 % (ref 4.2–6.1)
HCO3 SERPL-SCNC: 25 MMOL/L (ref 22–29)
HDLC SERPL-MCNC: 40 MG/DL
LDLC SERPL CALC-MCNC: 93 MG/DL
NONHDLC SERPL-MCNC: 119 MG/DL
POTASSIUM SERPL-SCNC: 4.4 MMOL/L (ref 3.4–5.3)
SODIUM SERPL-SCNC: 142 MMOL/L (ref 135–145)
TRIGL SERPL-MCNC: 132 MG/DL

## 2025-01-31 PROCEDURE — 80061 LIPID PANEL: CPT | Mod: ORL

## 2025-01-31 PROCEDURE — 80048 BASIC METABOLIC PNL TOTAL CA: CPT | Mod: ORL

## 2025-02-26 ENCOUNTER — LAB REQUISITION (OUTPATIENT)
Dept: LAB | Facility: CLINIC | Age: 70
End: 2025-02-26
Payer: COMMERCIAL

## 2025-02-26 DIAGNOSIS — E53.8 DEFICIENCY OF OTHER SPECIFIED B GROUP VITAMINS: ICD-10-CM

## 2025-02-26 DIAGNOSIS — E03.9 HYPOTHYROIDISM, UNSPECIFIED: ICD-10-CM

## 2025-02-26 PROCEDURE — 82607 VITAMIN B-12: CPT | Mod: ORL

## 2025-02-26 PROCEDURE — 84443 ASSAY THYROID STIM HORMONE: CPT | Mod: ORL

## 2025-02-27 LAB
TSH SERPL DL<=0.005 MIU/L-ACNC: 3.25 UIU/ML (ref 0.3–4.2)
VIT B12 SERPL-MCNC: 505 PG/ML (ref 232–1245)

## 2025-04-29 ENCOUNTER — LAB REQUISITION (OUTPATIENT)
Dept: LAB | Facility: CLINIC | Age: 70
End: 2025-04-29
Payer: COMMERCIAL

## 2025-04-29 DIAGNOSIS — Z12.5 ENCOUNTER FOR SCREENING FOR MALIGNANT NEOPLASM OF PROSTATE: ICD-10-CM

## 2025-04-29 DIAGNOSIS — R41.3 OTHER AMNESIA: ICD-10-CM

## 2025-04-30 LAB
B BURGDOR IGG+IGM SER QL: 0.05
FOLATE SERPL-MCNC: 4.5 NG/ML (ref 4.6–34.8)
PSA SERPL DL<=0.01 NG/ML-MCNC: 2.12 NG/ML (ref 0–4.5)
T PALLIDUM AB SER QL: NONREACTIVE

## 2025-05-03 ENCOUNTER — HEALTH MAINTENANCE LETTER (OUTPATIENT)
Age: 70
End: 2025-05-03

## 2025-07-07 ENCOUNTER — OFFICE VISIT (OUTPATIENT)
Dept: PHARMACY | Facility: PHYSICIAN GROUP | Age: 70
End: 2025-07-07
Payer: COMMERCIAL

## 2025-07-07 DIAGNOSIS — G62.9 NEUROPATHY: ICD-10-CM

## 2025-07-07 DIAGNOSIS — E78.5 HYPERLIPIDEMIA LDL GOAL <100: ICD-10-CM

## 2025-07-07 DIAGNOSIS — E11.9 TYPE 2 DIABETES MELLITUS WITHOUT COMPLICATION, WITHOUT LONG-TERM CURRENT USE OF INSULIN (H): Primary | ICD-10-CM

## 2025-07-07 DIAGNOSIS — I25.10 CORONARY ARTERY DISEASE INVOLVING NATIVE CORONARY ARTERY OF NATIVE HEART WITHOUT ANGINA PECTORIS: ICD-10-CM

## 2025-07-07 DIAGNOSIS — Z78.9 TAKES DIETARY SUPPLEMENTS: ICD-10-CM

## 2025-07-07 DIAGNOSIS — I48.0 PAROXYSMAL ATRIAL FIBRILLATION (H): ICD-10-CM

## 2025-07-07 DIAGNOSIS — R39.14 BENIGN PROSTATIC HYPERPLASIA WITH INCOMPLETE BLADDER EMPTYING: ICD-10-CM

## 2025-07-07 DIAGNOSIS — E03.9 ACQUIRED HYPOTHYROIDISM: ICD-10-CM

## 2025-07-07 DIAGNOSIS — N40.1 BENIGN PROSTATIC HYPERPLASIA WITH INCOMPLETE BLADDER EMPTYING: ICD-10-CM

## 2025-07-07 DIAGNOSIS — J30.2 SEASONAL ALLERGIES: ICD-10-CM

## 2025-07-07 PROCEDURE — 99605 MTMS BY PHARM NP 15 MIN: CPT | Performed by: PHARMACIST

## 2025-07-07 PROCEDURE — 99607 MTMS BY PHARM ADDL 15 MIN: CPT | Performed by: PHARMACIST

## 2025-07-07 NOTE — LETTER
"Recommended To-Do List      Prepared on: Jul 7, 2025       You can get the best results from your medications by completing the items on this \"To-Do List.\"      Bring your To-Do List when you go to your doctor. And, share it with your family or caregivers.    My To-Do List:  What we talked about: What I should do:    What my medicines are for, how to know if my medicines are working, made sure my medicines are safe for me and reviewed how to take my medicines.     Take my medicines every day               "

## 2025-07-07 NOTE — LETTER
July 10, 2025  Khari Jamison  [address] [place] [id number]    Dear Mr. Jamison, [facility]     Thank you for talking with me on Jul 7, 2025 about your health and medications. As a follow-up to our conversation, I have included two documents:      Your Recommended To-Do List has steps you should take to get the best results from your medications.  Your Medication List will help you keep track of your medications and how to take them.    If you want to talk about these documents, please call Maria Teresa Paul RPH at phone: [phone], Monday-Friday 8-4:30pm.    I look forward to working with you and your doctors to make sure your medications work well for you.    Sincerely,  Maria Teresa Paul RPH  [facility] Pharmacist, [facility]

## 2025-07-07 NOTE — LETTER
_  Medication List        Prepared on: Jul 7, 2025     Bring your Medication List when you go to the doctor, hospital, or   emergency room. And, share it with your family or caregivers.     Note any changes to how you take your medications.  Cross out medications when you no longer use them.    Medication How I take it Why I use it Prescriber   acetaminophen (TYLENOL) 650 MG CR tablet Take 650 mg by mouth every 8 hours as needed for mild pain or fever  pain self   alfuzosin ER (UROXATRAL) 10 MG 24 hr tablet Take 10 mg by mouth daily prostate Shea Robledo PA-C     aspirin 81 MG EC tablet Take 1 tablet (81 mg) by mouth daily Presence of Watchman left atrial appendage closure device Yamilka Beltran PA-C   cyanocobalamin (CYANOCOBALAMIN) 1000 MCG/ML injection Inject 1 mL into the muscle every 30 days  B12 deficiency Shea Robledo PA-C     levothyroxine (SYNTHROID, LEVOTHROID) 88 MCG tablet Take 88 mcg by mouth At Bedtime  Low thyroid Shea Robledo PA-C     pregabalin (LYRICA) 75 MG capsule Take 75 mg by mouth 3 times daily. Neuropathic Pain Shea Robledo PA-C   rosuvastatin (CRESTOR) 5 MG tablet Take 5 mg by mouth every other day  High cholesterol, diabetes Shea Robledo PA-C     Semaglutide, 1 MG/DOSE, (OZEMPIC, 1 MG/DOSE,) 4 MG/3ML pen Inject 1 mg Subcutaneous every 7 days  Type 2 diabetes Shea Robledo PA-C     triamcinolone (NASACORT) 55 MCG/ACT nasal aerosol Spray 2 sprays into both nostrils daily as needed  allergies self   Vitamin D3 (VITAMIN D, CHOLECALCIFEROL,) 25 mcg (1000 units) tablet Take 1 tablet by mouth every morning General health self         Add new medications, over-the-counter drugs, herbals, vitamins, or  minerals in the blank rows below.    Medication How I take it Why I use it Prescriber                                      Allergies:      - Ezetimibe - GI Disturbance  - Simvastatin        Side effects I have had:      Not on File        Other  Information:              My notes and questions:

## 2025-07-07 NOTE — PROGRESS NOTES
Medication Therapy Management (MTM) Encounter    ASSESSMENT:                            Medication Adherence/Access: will Re-enrolled in [employer] for 2026 in the fall.    Afib/CAD: Blood pressure at goal less than 130/80 mmHg. Tolerating rosuvastatin well. LDL at goal <100mg/dl - continue current dose. Could pursue more strict goal LDL <70 though has had tolerance issues to statins in the past.     Type 2 Diabetes: A1c at goal less than 7%.  Continue aspirin. Due for UACR. No indication for ARB without microabuminuria     Neuropathy/Back Pain: Stable.    Hypothyroidism: Stable.. Last TSH is within normal limits.    BPH: stable.    Supplements  stable    Allergy  Stable     PLAN:                            Continue current medications     Medication issues to be addressed at a future visit:   -Due for microalbumin    Follow-up: PCP 7/29, Maria Teresa 10/13     SUBJECTIVE/OBJECTIVE:                          Khari Jamison is a 68 year old male coming in for a follow-up visit from 6/21/24. Patient was accompanied by wife, Kenyetta. Previously followed with MTM in [facility]      Reason for visit: Medication Therapy Management .    Allergies/ADRs: Reviewed in chart  Past Medical History: Reviewed in chart  Tobacco: He reports that he has never smoked. He has never used smokeless tobacco.  Alcohol: Less than 1 beverages / week  Caffeine: Not currently using    Medication Adherence/Access: Patient receiving Ozempic from patient assistance program - approved for 2025    Afib/CAD:   Rosuvastatin 5 mg tablet every other day  Aspirin 81 mg once daily     Patient had successful Watchman procedure. Patient does not self-monitor blood pressure. Has ezetimibe listed as allergy, doesn't remember this causing stomach upset.     Type 2 Diabetes:   Ozempic 1 mg weekly     Denies side effects.  Symptoms of low blood sugar? none  Symptoms of high blood sugar? none  Diet/Exercise: Patient works to limit carbohydrates/sweets and  eat healthfully.  Goal is to manage blood sugar and weight.  Aspirin: Denies side effects  Microalbumin: due  A1C 5.6% 1/31/25 - Abstracted A1c July 10, 2025   Blood sugar monitoring: CGM -Ojse 3     Neuropathy/Back Pain:   Pregabalin 75 mg capsule - 1 capsule three times daily     Pregabalin dose is effective, denies dizziness. A Denies side effects    Hypothyroidism:   Levothyroxine 88 mcg daily  Patient is not having symptoms or side effects     BPH:  Alfuzosin ER 10 mg once daily    Works well, denies side effects.    Supplements  Vitamin B12 injection monthly  Vitamin D 1000 units daily  No reported issues     Allergy  Nasacort 1 spray each side daily as needed   Only using seasonally in spring and summer. No side effects noted    Today's Vitals: There were no vitals taken for this visit.  ----------------      I spent 30 minutes with this patient today. All changes were made via collaborative practice agreement with Shea Robledo PA-C    A summary of these recommendations was sent via [email or web address].    Maria Teresa Paul Pharm.D.  Medication Therapy Management Pharmacist           Medication Therapy Recommendations  No medication therapy recommendations to display

## 2025-07-24 ENCOUNTER — TELEPHONE (OUTPATIENT)
Dept: CARDIOLOGY | Facility: CLINIC | Age: 70
End: 2025-07-24
Payer: COMMERCIAL

## 2025-07-29 ENCOUNTER — LAB REQUISITION (OUTPATIENT)
Dept: LAB | Facility: CLINIC | Age: 70
End: 2025-07-29
Payer: COMMERCIAL

## 2025-07-29 DIAGNOSIS — E11.40 TYPE 2 DIABETES MELLITUS WITH DIABETIC NEUROPATHY, UNSPECIFIED (H): ICD-10-CM

## 2025-07-30 LAB
ANION GAP SERPL CALCULATED.3IONS-SCNC: 12 MMOL/L (ref 7–15)
BUN SERPL-MCNC: 19.6 MG/DL (ref 8–23)
CALCIUM SERPL-MCNC: 9.5 MG/DL (ref 8.8–10.4)
CHLORIDE SERPL-SCNC: 106 MMOL/L (ref 98–107)
CREAT SERPL-MCNC: 1.14 MG/DL (ref 0.67–1.17)
CREAT UR-MCNC: 237 MG/DL
EGFRCR SERPLBLD CKD-EPI 2021: 69 ML/MIN/1.73M2
GLUCOSE SERPL-MCNC: 118 MG/DL (ref 70–99)
HCO3 SERPL-SCNC: 24 MMOL/L (ref 22–29)
MICROALBUMIN UR-MCNC: <12 MG/L
MICROALBUMIN/CREAT UR: NORMAL MG/G{CREAT}
POTASSIUM SERPL-SCNC: 4.2 MMOL/L (ref 3.4–5.3)
SODIUM SERPL-SCNC: 142 MMOL/L (ref 135–145)

## 2025-07-30 NOTE — TELEPHONE ENCOUNTER
Patient said he just saw his primary care provider yesterday, and he continues on 81 mg daily aspirin.    MODIFIED MEETA SCALE   Timepoint: 2yr Post-LAAC    Previous score: 0    Score Description   0 No symptoms at all   1 No significant disability despite symptoms; able to carry out all usual duties and activities   2 Slight disability; unable to carry out all previous activities, but able to look after own affairs without assistance   3 Moderate disability; requiring some help, but able to walk without assistance   4 Moderately severe disability; unable to walk without assistance and unable to attend to own bodily needs without assistance   5 Severe disability; bedridden, incontinent and requiring constant nursing care and attention   6 Dead    Total score (0 - 6):  0    Change in score if s/p LAAC? No  If yes, notify implanting cardiologist.    Mirela Joyce RN  Structural Heart Coordinator   [facility]  [phone]

## 2025-08-16 ENCOUNTER — HEALTH MAINTENANCE LETTER (OUTPATIENT)
Age: 70
End: 2025-08-16

## (undated) DEVICE — SYR ANGIOGRAPHY MULTIUSE KIT ACIST 014612

## (undated) DEVICE — KIT HAND CONTROL ACIST 014644 AR-P54

## (undated) DEVICE — OCCLUDER CV WATCHMAN FLX OD27 MM M635WU50270: Type: IMPLANTABLE DEVICE | Site: HEART | Status: NON-FUNCTIONAL

## (undated) DEVICE — SHEATH WITH DILATOR ACCESS SYSTEM FXD CRV DBL M635TU80020

## (undated) DEVICE — CUSTOM PACK CORONARY SAN5BCRHEA

## (undated) DEVICE — MANIFOLD KIT ANGIO AUTOMATED 014613

## (undated) DEVICE — ELECTRODE DEFIB CADENCE 22550R

## (undated) DEVICE — TRANSDUCER TRAY ARTERIAL 42646-06

## (undated) DEVICE — INTRODUCER CHECK FLO 16FRX30CM .038

## (undated) DEVICE — INTRO MICRO MINI STICK 5FR STIFF NITINOL

## (undated) DEVICE — CATH ANGIO IMPULSE 6FR 110CML  PIGTAIL

## (undated) DEVICE — SHEATH GUIDING VERSACROSS D1 CURVE L85 CM L180 CBL VXAK0003

## (undated) RX ORDER — FENTANYL CITRATE 50 UG/ML
INJECTION, SOLUTION INTRAMUSCULAR; INTRAVENOUS
Status: DISPENSED
Start: 2023-07-27

## (undated) RX ORDER — ONDANSETRON 2 MG/ML
INJECTION INTRAMUSCULAR; INTRAVENOUS
Status: DISPENSED
Start: 2023-07-27

## (undated) RX ORDER — HEPARIN SODIUM 10000 [USP'U]/100ML
INJECTION, SOLUTION INTRAVENOUS
Status: DISPENSED
Start: 2023-07-27

## (undated) RX ORDER — PROPOFOL 10 MG/ML
INJECTION, EMULSION INTRAVENOUS
Status: DISPENSED
Start: 2023-07-27

## (undated) RX ORDER — LIDOCAINE HYDROCHLORIDE 10 MG/ML
INJECTION, SOLUTION EPIDURAL; INFILTRATION; INTRACAUDAL; PERINEURAL
Status: DISPENSED
Start: 2023-07-27

## (undated) RX ORDER — PROTAMINE SULFATE 10 MG/ML
INJECTION, SOLUTION INTRAVENOUS
Status: DISPENSED
Start: 2023-07-27

## (undated) RX ORDER — ASPIRIN 81 MG/1
TABLET, CHEWABLE ORAL
Status: DISPENSED
Start: 2023-07-27

## (undated) RX ORDER — DEXAMETHASONE SODIUM PHOSPHATE 10 MG/ML
INJECTION, SOLUTION INTRAMUSCULAR; INTRAVENOUS
Status: DISPENSED
Start: 2023-07-27

## (undated) RX ORDER — CEFAZOLIN SODIUM/WATER 3 G/30 ML
SYRINGE (ML) INTRAVENOUS
Status: DISPENSED
Start: 2023-07-27

## (undated) RX ORDER — DEXMEDETOMIDINE HYDROCHLORIDE 4 UG/ML
INJECTION, SOLUTION INTRAVENOUS
Status: DISPENSED
Start: 2023-07-27

## (undated) RX ORDER — FENTANYL CITRATE-0.9 % NACL/PF 10 MCG/ML
PLASTIC BAG, INJECTION (ML) INTRAVENOUS
Status: DISPENSED
Start: 2023-07-27

## (undated) RX ORDER — PHENYLEPHRINE HYDROCHLORIDE 10 MG/ML
INJECTION INTRAVENOUS
Status: DISPENSED
Start: 2023-07-27